# Patient Record
Sex: FEMALE | Race: WHITE | HISPANIC OR LATINO | Employment: UNEMPLOYED | URBAN - METROPOLITAN AREA
[De-identification: names, ages, dates, MRNs, and addresses within clinical notes are randomized per-mention and may not be internally consistent; named-entity substitution may affect disease eponyms.]

---

## 2017-01-08 DIAGNOSIS — I11.0 HYPERTENSIVE HEART DISEASE WITH CONGESTIVE HEART FAILURE (HCC): Primary | ICD-10-CM

## 2017-01-09 NOTE — TELEPHONE ENCOUNTER
Was the patient seen in the last year in this department? No  dialysis patient    Does patient have an active prescription for medications requested? No     Received Request Via: Pharmacy

## 2017-01-10 RX ORDER — CLONIDINE HYDROCHLORIDE 0.3 MG/1
TABLET ORAL
Qty: 60 TAB | Refills: 0 | Status: ON HOLD | OUTPATIENT
Start: 2017-01-10 | End: 2017-06-15

## 2017-01-24 ENCOUNTER — HOSPITAL ENCOUNTER (OUTPATIENT)
Dept: RADIOLOGY | Facility: MEDICAL CENTER | Age: 37
End: 2017-01-24
Attending: FAMILY MEDICINE
Payer: COMMERCIAL

## 2017-01-24 ENCOUNTER — HOSPITAL ENCOUNTER (OUTPATIENT)
Dept: CARDIOLOGY | Facility: MEDICAL CENTER | Age: 37
End: 2017-01-24
Attending: FAMILY MEDICINE
Payer: COMMERCIAL

## 2017-01-24 DIAGNOSIS — N18.5 CHRONIC KIDNEY DISEASE, STAGE V (HCC): ICD-10-CM

## 2017-01-24 PROCEDURE — 71020 DX-CHEST-2 VIEWS: CPT

## 2017-01-24 PROCEDURE — 93017 CV STRESS TEST TRACING ONLY: CPT

## 2017-01-24 PROCEDURE — 76700 US EXAM ABDOM COMPLETE: CPT

## 2017-01-24 PROCEDURE — 93350 STRESS TTE ONLY: CPT

## 2017-01-24 PROCEDURE — 93018 CV STRESS TEST I&R ONLY: CPT | Performed by: INTERNAL MEDICINE

## 2017-01-24 PROCEDURE — 93350 STRESS TTE ONLY: CPT | Mod: 26 | Performed by: INTERNAL MEDICINE

## 2017-01-25 LAB
LV EJECT FRACT  99904: 40
LV EJECT FRACT MOD 2C 99903: 52.1
LV EJECT FRACT MOD 4C 99902: 40.53
LV EJECT FRACT MOD BP 99901: 45.41

## 2017-02-03 ENCOUNTER — TELEPHONE (OUTPATIENT)
Dept: NEPHROLOGY | Facility: MEDICAL CENTER | Age: 37
End: 2017-02-03

## 2017-02-03 NOTE — TELEPHONE ENCOUNTER
1. Caller Name: Stephenie-[facility] transplant                      Call Back Number: [phone]    2. Message: Stephenie just wanted to make sure that nephrology is aware that pts echo came back abn. She would like to recommend a request for cardiac clearance. I'm not sure if this is Dr Flower or Dr Garcia patient currently, so I will send this to the both of you.    3. Patient approves office to leave a detailed voicemail/MyChart message: yes

## 2017-02-17 DIAGNOSIS — N18.6 ESRD (END STAGE RENAL DISEASE) ON DIALYSIS (HCC): ICD-10-CM

## 2017-02-17 DIAGNOSIS — R94.39 ABNORMAL STRESS ECHOCARDIOGRAM: ICD-10-CM

## 2017-02-17 DIAGNOSIS — Z99.2 ESRD (END STAGE RENAL DISEASE) ON DIALYSIS (HCC): ICD-10-CM

## 2017-03-21 ENCOUNTER — TELEPHONE (OUTPATIENT)
Dept: NEPHROLOGY | Facility: MEDICAL CENTER | Age: 37
End: 2017-03-21

## 2017-03-21 NOTE — TELEPHONE ENCOUNTER
1. Caller Name: Mago Billings                      Call Back Number: [phone]    2. Message: Pt called, she is requesting an rx to be sent to her pharmacy. She says that she saw Dr Flower in dialysis and he would send rx. She thinks that it is [facility]. I also paged Dr Flower to advise.     3. Patient approves office to leave a detailed voicemail/MyChart message: yes

## 2017-04-10 ENCOUNTER — TELEPHONE (OUTPATIENT)
Dept: NEPHROLOGY | Facility: MEDICAL CENTER | Age: 37
End: 2017-04-10

## 2017-04-10 NOTE — TELEPHONE ENCOUNTER
1. Caller Name: Chencho Willingham                      Call Back Number: -    2. Message: ANA LAURA Johnson called to advise that patient has decided to transfer care to [facility].     3. Patient approves office to leave a detailed voicemail/MyChart message: N/A

## 2017-06-14 ENCOUNTER — RESOLUTE PROFESSIONAL BILLING HOSPITAL PROF FEE (OUTPATIENT)
Dept: HOSPITALIST | Facility: MEDICAL CENTER | Age: 37
End: 2017-06-14
Payer: COMMERCIAL

## 2017-06-14 ENCOUNTER — APPOINTMENT (OUTPATIENT)
Dept: RADIOLOGY | Facility: MEDICAL CENTER | Age: 37
DRG: 304 | End: 2017-06-14
Attending: EMERGENCY MEDICINE
Payer: COMMERCIAL

## 2017-06-14 ENCOUNTER — HOSPITAL ENCOUNTER (INPATIENT)
Facility: MEDICAL CENTER | Age: 37
LOS: 1 days | DRG: 304 | End: 2017-06-15
Attending: EMERGENCY MEDICINE | Admitting: HOSPITALIST
Payer: COMMERCIAL

## 2017-06-14 DIAGNOSIS — I16.0 HYPERTENSIVE URGENCY: ICD-10-CM

## 2017-06-14 LAB
ALBUMIN SERPL BCP-MCNC: 4.5 G/DL (ref 3.2–4.9)
ALBUMIN/GLOB SERPL: 1.3 G/DL
ALP SERPL-CCNC: 86 U/L (ref 30–99)
ALT SERPL-CCNC: 16 U/L (ref 2–50)
ANION GAP SERPL CALC-SCNC: 15 MMOL/L (ref 0–11.9)
APTT PPP: 35 SEC (ref 24.7–36)
AST SERPL-CCNC: 16 U/L (ref 12–45)
BASOPHILS # BLD AUTO: 1.1 % (ref 0–1.8)
BASOPHILS # BLD: 0.04 K/UL (ref 0–0.12)
BILIRUB SERPL-MCNC: 0.6 MG/DL (ref 0.1–1.5)
BNP SERPL-MCNC: 500 PG/ML (ref 0–100)
BUN SERPL-MCNC: 31 MG/DL (ref 8–22)
CALCIUM SERPL-MCNC: 9.2 MG/DL (ref 8.4–10.2)
CHLORIDE SERPL-SCNC: 95 MMOL/L (ref 96–112)
CO2 SERPL-SCNC: 28 MMOL/L (ref 20–33)
CREAT SERPL-MCNC: 5.12 MG/DL (ref 0.5–1.4)
EKG IMPRESSION: NORMAL
EOSINOPHIL # BLD AUTO: 0.13 K/UL (ref 0–0.51)
EOSINOPHIL NFR BLD: 3.6 % (ref 0–6.9)
ERYTHROCYTE [DISTWIDTH] IN BLOOD BY AUTOMATED COUNT: 43 FL (ref 35.9–50)
GFR SERPL CREATININE-BSD FRML MDRD: 10 ML/MIN/1.73 M 2
GLOBULIN SER CALC-MCNC: 3.6 G/DL (ref 1.9–3.5)
GLUCOSE SERPL-MCNC: 101 MG/DL (ref 65–99)
HCT VFR BLD AUTO: 37.4 % (ref 37–47)
HGB BLD-MCNC: 12.3 G/DL (ref 12–16)
IMM GRANULOCYTES # BLD AUTO: 0.01 K/UL (ref 0–0.11)
IMM GRANULOCYTES NFR BLD AUTO: 0.3 % (ref 0–0.9)
INR PPP: 1.01 (ref 0.87–1.13)
LYMPHOCYTES # BLD AUTO: 0.43 K/UL (ref 1–4.8)
LYMPHOCYTES NFR BLD: 11.9 % (ref 22–41)
MCH RBC QN AUTO: 30.7 PG (ref 27–33)
MCHC RBC AUTO-ENTMCNC: 32.9 G/DL (ref 33.6–35)
MCV RBC AUTO: 93.3 FL (ref 81.4–97.8)
MONOCYTES # BLD AUTO: 0.3 K/UL (ref 0–0.85)
MONOCYTES NFR BLD AUTO: 8.3 % (ref 0–13.4)
NEUTROPHILS # BLD AUTO: 2.71 K/UL (ref 2–7.15)
NEUTROPHILS NFR BLD: 74.8 % (ref 44–72)
NRBC # BLD AUTO: 0 K/UL
NRBC BLD AUTO-RTO: 0 /100 WBC
PLATELET # BLD AUTO: 160 K/UL (ref 164–446)
PMV BLD AUTO: 10.3 FL (ref 9–12.9)
POTASSIUM SERPL-SCNC: 4.3 MMOL/L (ref 3.6–5.5)
PROT SERPL-MCNC: 8.1 G/DL (ref 6–8.2)
PROTHROMBIN TIME: 13.1 SEC (ref 12–14.6)
RBC # BLD AUTO: 4.01 M/UL (ref 4.2–5.4)
SODIUM SERPL-SCNC: 138 MMOL/L (ref 135–145)
TROPONIN I SERPL-MCNC: 0.02 NG/ML (ref 0–0.04)
TROPONIN I SERPL-MCNC: <0.02 NG/ML (ref 0–0.04)
WBC # BLD AUTO: 3.6 K/UL (ref 4.8–10.8)

## 2017-06-14 PROCEDURE — 94760 N-INVAS EAR/PLS OXIMETRY 1: CPT

## 2017-06-14 PROCEDURE — 80053 COMPREHEN METABOLIC PANEL: CPT

## 2017-06-14 PROCEDURE — 36415 COLL VENOUS BLD VENIPUNCTURE: CPT

## 2017-06-14 PROCEDURE — 700105 HCHG RX REV CODE 258: Performed by: HOSPITALIST

## 2017-06-14 PROCEDURE — 700102 HCHG RX REV CODE 250 W/ 637 OVERRIDE(OP): Performed by: HOSPITALIST

## 2017-06-14 PROCEDURE — 700111 HCHG RX REV CODE 636 W/ 250 OVERRIDE (IP): Performed by: EMERGENCY MEDICINE

## 2017-06-14 PROCEDURE — 99291 CRITICAL CARE FIRST HOUR: CPT | Performed by: HOSPITALIST

## 2017-06-14 PROCEDURE — A9270 NON-COVERED ITEM OR SERVICE: HCPCS | Performed by: HOSPITALIST

## 2017-06-14 PROCEDURE — 70450 CT HEAD/BRAIN W/O DYE: CPT

## 2017-06-14 PROCEDURE — 85730 THROMBOPLASTIN TIME PARTIAL: CPT

## 2017-06-14 PROCEDURE — 96375 TX/PRO/DX INJ NEW DRUG ADDON: CPT

## 2017-06-14 PROCEDURE — 84484 ASSAY OF TROPONIN QUANT: CPT

## 2017-06-14 PROCEDURE — 770022 HCHG ROOM/CARE - ICU (200)

## 2017-06-14 PROCEDURE — 83880 ASSAY OF NATRIURETIC PEPTIDE: CPT

## 2017-06-14 PROCEDURE — 700101 HCHG RX REV CODE 250

## 2017-06-14 PROCEDURE — 93005 ELECTROCARDIOGRAM TRACING: CPT

## 2017-06-14 PROCEDURE — 85610 PROTHROMBIN TIME: CPT

## 2017-06-14 PROCEDURE — 93005 ELECTROCARDIOGRAM TRACING: CPT | Performed by: EMERGENCY MEDICINE

## 2017-06-14 PROCEDURE — 96374 THER/PROPH/DIAG INJ IV PUSH: CPT

## 2017-06-14 PROCEDURE — 71010 DX-CHEST-PORTABLE (1 VIEW): CPT

## 2017-06-14 PROCEDURE — 700101 HCHG RX REV CODE 250: Performed by: HOSPITALIST

## 2017-06-14 PROCEDURE — 99291 CRITICAL CARE FIRST HOUR: CPT

## 2017-06-14 PROCEDURE — 700111 HCHG RX REV CODE 636 W/ 250 OVERRIDE (IP): Performed by: HOSPITALIST

## 2017-06-14 PROCEDURE — 85025 COMPLETE CBC W/AUTO DIFF WBC: CPT

## 2017-06-14 RX ORDER — LORAZEPAM 2 MG/ML
2 INJECTION INTRAMUSCULAR EVERY 4 HOURS PRN
Status: DISCONTINUED | OUTPATIENT
Start: 2017-06-14 | End: 2017-06-14

## 2017-06-14 RX ORDER — METOPROLOL TARTRATE 100 MG/1
100 TABLET ORAL DAILY
Status: ON HOLD | COMMUNITY
End: 2017-06-15

## 2017-06-14 RX ORDER — METOPROLOL TARTRATE 50 MG/1
100 TABLET, FILM COATED ORAL TWICE DAILY
Status: DISCONTINUED | OUTPATIENT
Start: 2017-06-14 | End: 2017-06-15

## 2017-06-14 RX ORDER — DILTIAZEM HYDROCHLORIDE 5 MG/ML
10 INJECTION INTRAVENOUS ONCE
Status: DISPENSED | OUTPATIENT
Start: 2017-06-14 | End: 2017-06-15

## 2017-06-14 RX ORDER — NICARDIPINE HYDROCHLORIDE 2.5 MG/ML
INJECTION INTRAVENOUS
Status: COMPLETED
Start: 2017-06-14 | End: 2017-06-15

## 2017-06-14 RX ORDER — LORAZEPAM 0.5 MG/1
.5-2 TABLET ORAL EVERY 4 HOURS PRN
Status: DISCONTINUED | OUTPATIENT
Start: 2017-06-14 | End: 2017-06-15 | Stop reason: HOSPADM

## 2017-06-14 RX ORDER — LABETALOL HYDROCHLORIDE 5 MG/ML
10 INJECTION, SOLUTION INTRAVENOUS ONCE
Status: DISCONTINUED | OUTPATIENT
Start: 2017-06-14 | End: 2017-06-14

## 2017-06-14 RX ORDER — HYDRALAZINE HYDROCHLORIDE 100 MG/1
100 TABLET, FILM COATED ORAL 2 TIMES DAILY
Status: ON HOLD | COMMUNITY
End: 2017-06-15

## 2017-06-14 RX ORDER — HYDRALAZINE HYDROCHLORIDE 20 MG/ML
10 INJECTION INTRAMUSCULAR; INTRAVENOUS
Status: COMPLETED | OUTPATIENT
Start: 2017-06-14 | End: 2017-06-15

## 2017-06-14 RX ORDER — BISACODYL 10 MG
10 SUPPOSITORY, RECTAL RECTAL
Status: DISCONTINUED | OUTPATIENT
Start: 2017-06-14 | End: 2017-06-15 | Stop reason: HOSPADM

## 2017-06-14 RX ORDER — ACETAMINOPHEN 325 MG/1
650 TABLET ORAL EVERY 6 HOURS PRN
Status: DISCONTINUED | OUTPATIENT
Start: 2017-06-14 | End: 2017-06-15 | Stop reason: HOSPADM

## 2017-06-14 RX ORDER — ENALAPRILAT 1.25 MG/ML
1.25 INJECTION INTRAVENOUS EVERY 6 HOURS PRN
Status: DISCONTINUED | OUTPATIENT
Start: 2017-06-14 | End: 2017-06-15 | Stop reason: HOSPADM

## 2017-06-14 RX ORDER — SEVELAMER HYDROCHLORIDE 800 MG/1
2400 TABLET, FILM COATED ORAL
Status: DISCONTINUED | OUTPATIENT
Start: 2017-06-14 | End: 2017-06-15 | Stop reason: HOSPADM

## 2017-06-14 RX ORDER — LABETALOL HYDROCHLORIDE 5 MG/ML
INJECTION, SOLUTION INTRAVENOUS
Status: DISPENSED
Start: 2017-06-14 | End: 2017-06-15

## 2017-06-14 RX ORDER — DILTIAZEM HYDROCHLORIDE 5 MG/ML
10 INJECTION INTRAVENOUS ONCE
Status: ACTIVE | OUTPATIENT
Start: 2017-06-14 | End: 2017-06-15

## 2017-06-14 RX ORDER — CLONIDINE HYDROCHLORIDE 0.1 MG/1
0.3 TABLET ORAL TWICE DAILY
Status: DISCONTINUED | OUTPATIENT
Start: 2017-06-14 | End: 2017-06-15 | Stop reason: HOSPADM

## 2017-06-14 RX ORDER — LORAZEPAM 2 MG/ML
0.5 INJECTION INTRAMUSCULAR EVERY 6 HOURS PRN
Status: DISCONTINUED | OUTPATIENT
Start: 2017-06-14 | End: 2017-06-14

## 2017-06-14 RX ORDER — NICARDIPINE HYDROCHLORIDE 2.5 MG/ML
INJECTION INTRAVENOUS
Status: COMPLETED
Start: 2017-06-14 | End: 2017-06-14

## 2017-06-14 RX ORDER — LORAZEPAM 2 MG/ML
.5-2 INJECTION INTRAMUSCULAR EVERY 4 HOURS PRN
Status: DISCONTINUED | OUTPATIENT
Start: 2017-06-14 | End: 2017-06-15 | Stop reason: HOSPADM

## 2017-06-14 RX ORDER — LORAZEPAM 1 MG/1
2 TABLET ORAL EVERY 4 HOURS PRN
Status: DISCONTINUED | OUTPATIENT
Start: 2017-06-14 | End: 2017-06-14

## 2017-06-14 RX ORDER — POLYETHYLENE GLYCOL 3350 17 G/17G
1 POWDER, FOR SOLUTION ORAL
Status: DISCONTINUED | OUTPATIENT
Start: 2017-06-14 | End: 2017-06-15 | Stop reason: HOSPADM

## 2017-06-14 RX ORDER — LORAZEPAM 1 MG/1
0.5 TABLET ORAL EVERY 6 HOURS PRN
Status: DISCONTINUED | OUTPATIENT
Start: 2017-06-14 | End: 2017-06-14

## 2017-06-14 RX ORDER — SEVELAMER CARBONATE 800 MG/1
2400 TABLET, FILM COATED ORAL
COMMUNITY
End: 2017-06-16 | Stop reason: SDUPTHER

## 2017-06-14 RX ORDER — ONDANSETRON 2 MG/ML
4 INJECTION INTRAMUSCULAR; INTRAVENOUS EVERY 4 HOURS PRN
Status: DISCONTINUED | OUTPATIENT
Start: 2017-06-14 | End: 2017-06-15 | Stop reason: HOSPADM

## 2017-06-14 RX ORDER — HEPARIN SODIUM 5000 [USP'U]/ML
5000 INJECTION, SOLUTION INTRAVENOUS; SUBCUTANEOUS EVERY 8 HOURS
Status: DISCONTINUED | OUTPATIENT
Start: 2017-06-14 | End: 2017-06-15 | Stop reason: HOSPADM

## 2017-06-14 RX ORDER — DILTIAZEM HYDROCHLORIDE 5 MG/ML
10 INJECTION INTRAVENOUS ONCE
Status: COMPLETED | OUTPATIENT
Start: 2017-06-14 | End: 2017-06-14

## 2017-06-14 RX ORDER — ONDANSETRON 2 MG/ML
4 INJECTION INTRAMUSCULAR; INTRAVENOUS ONCE
Status: COMPLETED | OUTPATIENT
Start: 2017-06-14 | End: 2017-06-14

## 2017-06-14 RX ORDER — HYDRALAZINE HYDROCHLORIDE 25 MG/1
100 TABLET, FILM COATED ORAL 3 TIMES DAILY
Status: DISCONTINUED | OUTPATIENT
Start: 2017-06-14 | End: 2017-06-15 | Stop reason: HOSPADM

## 2017-06-14 RX ORDER — AMOXICILLIN 250 MG
2 CAPSULE ORAL 2 TIMES DAILY
Status: DISCONTINUED | OUTPATIENT
Start: 2017-06-14 | End: 2017-06-15 | Stop reason: HOSPADM

## 2017-06-14 RX ADMIN — LORAZEPAM 1.5 MG: 2 INJECTION INTRAMUSCULAR; INTRAVENOUS at 21:11

## 2017-06-14 RX ADMIN — HYDRALAZINE HYDROCHLORIDE 10 MG: 20 INJECTION INTRAMUSCULAR; INTRAVENOUS at 14:54

## 2017-06-14 RX ADMIN — METOPROLOL TARTRATE 100 MG: 50 TABLET ORAL at 19:43

## 2017-06-14 RX ADMIN — ONDANSETRON 4 MG: 2 INJECTION INTRAMUSCULAR; INTRAVENOUS at 21:12

## 2017-06-14 RX ADMIN — NICARDIPINE HYDROCHLORIDE 25 MG: 25 INJECTION INTRAVENOUS at 20:25

## 2017-06-14 RX ADMIN — HYDRALAZINE HYDROCHLORIDE 10 MG: 20 INJECTION INTRAMUSCULAR; INTRAVENOUS at 14:37

## 2017-06-14 RX ADMIN — HYDRALAZINE HYDROCHLORIDE 50 MG: 25 TABLET ORAL at 21:49

## 2017-06-14 RX ADMIN — RENAGEL 2400 MG: 800 TABLET ORAL at 19:43

## 2017-06-14 RX ADMIN — HYDROMORPHONE HYDROCHLORIDE 0.5 MG: 1 INJECTION, SOLUTION INTRAMUSCULAR; INTRAVENOUS; SUBCUTANEOUS at 17:38

## 2017-06-14 RX ADMIN — NICARDIPINE HYDROCHLORIDE 5 MG/HR: 2.5 INJECTION INTRAVENOUS at 20:26

## 2017-06-14 RX ADMIN — DILTIAZEM HYDROCHLORIDE 10 MG: 5 INJECTION INTRAVENOUS at 18:02

## 2017-06-14 RX ADMIN — CLONIDINE HYDROCHLORIDE 0.3 MG: 0.1 TABLET ORAL at 19:43

## 2017-06-14 RX ADMIN — LORAZEPAM 0.5 MG: 2 INJECTION INTRAMUSCULAR; INTRAVENOUS at 20:23

## 2017-06-14 RX ADMIN — ACETAMINOPHEN 650 MG: 325 TABLET, FILM COATED ORAL at 20:23

## 2017-06-14 RX ADMIN — ONDANSETRON 4 MG: 2 INJECTION INTRAMUSCULAR; INTRAVENOUS at 17:38

## 2017-06-14 RX ADMIN — MULTIVITAMIN TABLET 1 TABLET: TABLET at 21:49

## 2017-06-14 ASSESSMENT — PAIN SCALES - GENERAL
PAINLEVEL_OUTOF10: 10
PAINLEVEL_OUTOF10: 4
PAINLEVEL_OUTOF10: 10
PAINLEVEL_OUTOF10: 7

## 2017-06-14 NOTE — IP AVS SNAPSHOT
" Home Care Instructions                                                                                                                  Name:Mago Farfan  Medical Record Number:[id number]  CSN: [id number]    YOB: 1980   Age: 37 y.o.  Sex: female  HT:1.676 m (5' 5.98\") WT: 60 kg (132 lb 4.4 oz)          Admit Date: 6/14/2017     Discharge Date:   Today's Date: 6/15/2017  Attending Doctor:  Coleen Ling M.D.                  Allergies:  Iodine; Labetalol; and Morphine            Discharge Instructions       Discharge Instructions    Discharged to home by car with relative. Discharged via walking, hospital escort: Refused.  Special equipment needed: Not Applicable    Be sure to schedule a follow-up appointment with your primary care doctor or any specialists as instructed.     Discharge Plan:   Influenza Vaccine Indication: Not indicated: Previously immunized this influenza season and > 8 years of age    I understand that a diet low in cholesterol, fat, and sodium is recommended for good health. Unless I have been given specific instructions below for another diet, I accept this instruction as my diet prescription.   Other diet: Renal diet    Continue Dialysis Monday, Wednesday, Friday as scheduled.   Follow up with Primary care doctor in 1 week.   Follow up with nephrologist in 1 week.    Hipertensión  (Hypertension)  El término hipertensión es otra forma de denominar a la presión arterial elevada. La presión arterial elevada fuerza al corazón a trabajar más para bombear la tavo. Kaia lectura de la presión arterial consta de dos números: marquis más alto sobre marquis más bajo (por ejemplo, 110/72).  CUIDADOS EN EL HOGAR   · Dc que el médico le tome nuevamente la presión arterial.  · [place] los medicamentos solamente daja se lo haya indicado el médico. Siga cuidadosamente las indicaciones. Los medicamentos pierden eficacia si omite dosis. El hecho de omitir las dosis también aumenta el riesgo de otros " problemas.  · No fume.  · Contrólese la presión arterial en sher casa daja se lo haya indicado el médico.  SOLICITE AYUDA SI:  · Piensa que tiene amisha reacción a los medicamentos que está tomando.  · Tiene mareos o trinity de haleigh reiterados.  · Se le inflaman (hinchan) los tobillos.  · Tiene problemas de visión.  SOLICITE AYUDA DE INMEDIATO SI:   · Tiene un dolor de haleigh muy intenso y está confundido.  · Se siente débil, aturdido o se desmaya.  · Tiene dolor en el pecho o el estómago (abdominal).  · Tiene vómitos.  · No puede respirar muy anirudh.  ASEGÚRESE DE QUE:   · Comprende estas instrucciones.  · Controlará sher afección.  · Recibirá ayuda de inmediato si no mejora o si empeora.     Esta información no tiene daja fin reemplazar el consejo del médico. Asegúrese de hacerle al médico cualquier pregunta que tenga.     Document Released: 06/07/2011 Document Revised: 12/23/2014  [employer] Interactive Patient Education ©2016 [employer]      Special Instructions: None    · Is patient discharged on Warfarin / Coumadin?   No     · Is patient Post Blood Transfusion?  No    Depression / Suicide Risk    As you are discharged from this [facility] facility, it is important to learn how to keep safe from harming yourself.    Recognize the warning signs:  · Abrupt changes in personality, positive or negative- including increase in energy   · Giving away possessions  · Change in eating patterns- significant weight changes-  positive or negative  · Change in sleeping patterns- unable to sleep or sleeping all the time   · Unwillingness or inability to communicate  · Depression  · Unusual sadness, discouragement and loneliness  · Talk of wanting to die  · Neglect of personal appearance   · Rebelliousness- reckless behavior  · Withdrawal from people/activities they love  · Confusion- inability to concentrate     If you or a loved one observes any of these behaviors or has concerns about self-harm, here's what you can do:  · Talk  about it- your feelings and reasons for harming yourself  · Remove any means that you might use to hurt yourself (examples: pills, rope, extension cords, firearm)  · Get professional help from the community (Mental Health, Substance Abuse, psychological counseling)  · Do not be alone:Call your Safe Contact- someone whom you trust who will be there for you.  · Call your local CRISIS HOTLINE 630-0034 or 355-796-3204  · Call your local Children's Mobile Crisis Response Team [place] (856) 248-0662 or wwwNow In Store  · Call the toll free National Suicide Prevention Hotlines   · National Suicide Prevention Lifeline 603-709-IGLG (4576)  · National Hope Line Network 800-SUICIDE (127-4310)        Follow-up Information     1. Follow up with [facility] DIALYSIS [facility] In 1 day.    Specialty:  Nephrology    Why:  Keep appointment tomorrow morning for dialysis    Contact information    [phone]          2. Follow up with Quinton Saldana M.D.. Schedule an appointment as soon as possible for a visit in 1 week.    Specialty:  Family Medicine    Contact information    [facility] [facility] [address]  [facility] [address]  [phone]           Discharge Medication Instructions:    Below are the medications your physician expects you to take upon discharge:    Review all your home medications and newly ordered medications with your doctor and/or pharmacist. Follow medication instructions as directed by your doctor and/or pharmacist.    Please keep your medication list with you and share with your physician.               Medication List      CHANGE how you take these medications        Instructions    Morning Afternoon Evening Bedtime    hydrALAZINE 100 MG tablet   What changed:    - when to take this  - additional instructions   Last time this was given:  100 mg on 6/15/2017  3:29 PM   Commonly known as:  APRESOLINE        Take 1 Tab by mouth 3 times a day. Do not take if your blood pressure is <100/60   Dose:  100 mg                         metoprolol 100 MG Tabs   What changed:  when to take this   Last time this was given:  50 mg on 6/15/2017  1:43 PM   Commonly known as:  LOPRESSOR        Take 1 Tab by mouth 2 times a day.   Dose:  100 mg                          CONTINUE taking these medications        Instructions    Morning Afternoon Evening Bedtime    acetaminophen 325 MG Tabs   Last time this was given:  650 mg on 6/15/2017 12:09 PM   Commonly known as:  TYLENOL        Take 325 mg by mouth every 6 hours as needed for Moderate Pain.   Dose:  325 mg                        aspirin EC 81 MG Tbec   Commonly known as:  ECOTRIN        Take 81 mg by mouth as needed (For headache).   Dose:  81 mg                        DIALYVITE 800 PO        Take 1 Tab by mouth every evening.   Dose:  1 Tab                        RENVELA 800 MG Tabs   Generic drug:  Sevelamer Carbonate        Take 2,400 mg by mouth 3 times a day, with meals.   Dose:  2400 mg                          STOP taking these medications     clonidine 0.3 MG Tabs   Commonly known as:  CATAPRESS                    Where to Get Your Medications      [facility] [place] - [address]  [address] [place] [address]     Phone:  [phone]    - hydrALAZINE 100 MG tablet  - metoprolol 100 MG Tabs            Instructions           Diet / Nutrition:    Follow any diet instructions given to you by your doctor or the dietician, including how much salt (sodium) you are allowed each day.    If you are overweight, talk to your doctor about a weight reduction plan.    Activity:    Remain physically active following your doctor's instructions about exercise and activity.    Rest often.     Any time you become even a little tired or short of breath, SIT DOWN and rest.    Worsening Symptoms:    Report any of the following signs and symptoms to the doctor's office immediately:    *Pain of jaw, arm, or neck  *Chest pain not relieved by medication                                *Dizziness or loss of consciousness  *Difficulty breathing even when at rest   *More tired than usual                                       *Bleeding drainage or swelling of surgical site  *Swelling of feet, ankles, legs or stomach                 *Fever (>100ºF)  *Pink or blood tinged sputum  *Weight gain (3lbs/day or 5lbs /week)           *Shock from internal defibrillator (if applicable)  *Palpitations or irregular heartbeats                *Cool and/or numb extremities    Stroke Awareness    Common Risk Factors for Stroke include:    Age  Atrial Fibrillation  Carotid Artery Stenosis  Diabetes Mellitus  Excessive alcohol consumption  High blood pressure  Overweight   Physical inactivity  Smoking    Warning signs and symptoms of a stroke include:    *Sudden numbness or weakness of the face, arm or leg (especially on one side of the body).  *Sudden confusion, trouble speaking or understanding.  *Sudden trouble seeing in one or both eyes.  *Sudden trouble walking, dizziness, loss of balance or coordination.Sudden severe headache with no known cause.    It is very important to get treatment quickly when a stroke occurs. If you experience any of the above warning signs, call 911 immediately.                   Disclaimer         Quit Smoking / Tobacco Use:    I understand the use of any tobacco products increases my chance of suffering from future heart disease or stroke and could cause other illnesses which may shorten my life. Quitting the use of tobacco products is the single most important thing I can do to improve my health. For further information on smoking / tobacco cessation call a Toll Free Quit Line at 1-142.718.7056 (*National Cancer [place]) or 1-744.118.2387 (American Lung Association) or you can access the web based program at www.lungusa.org.    [place] Tobacco Users Help Line:  (481) 605-9575       Toll Free: 1-550.251.8125  Quit Tobacco Program [facility]  (800) 247-9157    Crisis Hotline:    [place] Crisis Hotline:  5-926-DILNQPM or 1-874.732.1405    [place] Crisis Hotline:    1-170.214.4606 or 685-361-6875    Discharge Survey:   Thank you for choosing [facility]. We hope we did everything we could to make your hospital stay a pleasant one. You may be receiving a phone survey and we would appreciate your time and participation in answering the questions. Your input is very valuable to us in our efforts to improve our service to our patients and their families.        My signature on this form indicates that:    1. I have reviewed and understand the above information.  2. My questions regarding this information have been answered to my satisfaction.  3. I have formulated a plan with my discharge nurse to obtain my prescribed medications for home.                  Disclaimer         __________________________________                     __________       ________                       Patient Signature                                                 Date                    Time

## 2017-06-14 NOTE — IP AVS SNAPSHOT
6/15/2017    Mago Farfan  [address]  [facility] [address]    Dear Mago:    [facility] wants to ensure your discharge home is safe and you or your loved ones have had all of your questions answered regarding your care after you leave the hospital.    Below is a list of resources and contact information should you have any questions regarding your hospital stay, follow-up instructions, or active medical symptoms.    Questions or Concerns Regarding… Contact   Medical Questions Related to Your Discharge  (7 days a week, 8am-5pm) Contact a Nurse Care Coordinator   [phone]   Medical Questions Not Related to Your Discharge  (24 hours a day / 7 days a week)  Contact the Nurse Health Line   [phone]    Medications or Discharge Instructions Refer to your discharge packet   or contact your [facility] Primary Care Provider   [phone]   Follow-up Appointment(s) Schedule your appointment via [employer]   or contact Scheduling [phone]   Billing Review your statement via [employer]  or contact Billing [phone]   Medical Records Review your records via [employer]   or contact Medical Records [phone]     You may receive a telephone call within two days of discharge. This call is to make certain you understand your discharge instructions and have the opportunity to have any questions answered. You can also easily access your medical information, test results and upcoming appointments via the [employer] free online health management tool. You can learn more and sign up at [employer]. For assistance setting up your [employer] account, please call [phone].    Once again, we want to ensure your discharge home is safe and that you have a clear understanding of any next steps in your care. If you have any questions or concerns, please do not hesitate to contact us, we are here for you. Thank you for choosing [facility] for your healthcare needs.    Sincerely,    Your [facility] Healthcare Team

## 2017-06-14 NOTE — IP AVS SNAPSHOT
Zigfu Access Code: [email or web address]  Expires: 6/27/2017  4:14 AM    Your email address is not on file at [facility].  Email Addresses are required for you to sign up for Zigfu, please contact [phone] to verify your personal information and to provide your email address prior to attempting to register for Zigfu.    Mago Farfan  [facility] [address]  [place] [address]    Zigfu  A secure, online tool to manage your health information     [employer]’s Zigfu® is a secure, online tool that connects you to your personalized health information from the privacy of your home -- day or night - making it very easy for you to manage your healthcare. Once the activation process is completed, you can even access your medical information using the Zigfu pancho, which is available for free in the Apple Pancho store or Google Play store.     To learn more about Zigfu, visit [email or web address]    There are two levels of access available (as shown below):   My Chart Features  [facility] Primary Care Doctor [facility]  Specialists [facility]  Urgent  Care Non-[facility] Primary Care Doctor   Email your healthcare team securely and privately 24/7 X X X    Manage appointments: schedule your next appointment; view details of past/upcoming appointments X      Request prescription refills. X      View recent personal medical records, including lab and immunizations X X X X   View health record, including health history, allergies, medications X X X X   Read reports about your outpatient visits, procedures, consult and ER notes X X X X   See your discharge summary, which is a recap of your hospital and/or ER visit that includes your diagnosis, lab results, and care plan X X  X     How to register for Zigfu:  Once your e-mail address has been verified, follow the following steps to sign up for Zigfu.     1. Go to  [email or web address]  2. Click on the Sign Up Now box, which takes you to the New Member Sign Up page.  You will need to provide the following information:  a. Enter your [employer] Access Code exactly as it appears at the top of this page. (You will not need to use this code after you’ve completed the sign-up process. If you do not sign up before the expiration date, you must request a new code.)   b. Enter your date of birth.   c. Enter your home email address.   d. Click Submit, and follow the next screen’s instructions.  3. Create a GetYout ID. This will be your Fariqak login ID and cannot be changed, so think of one that is secure and easy to remember.  4. Create a Fariqak password. You can change your password at any time.  5. Enter your Password Reset Question and Answer. This can be used at a later time if you forget your password.   6. Enter your e-mail address. This allows you to receive e-mail notifications when new information is available in Fariqak.  7. Click Sign Up. You can now view your health information.    For assistance activating your Fari[employer]ak account, call [phone]

## 2017-06-14 NOTE — ED NOTES
Pt /103. Went to give pt 3rd dose of hydralazine per ERP. Pt states no she doesn't want anymore. Pt states heart is going fast. Pt HR now at 115, ERP aware. Repeat EKG ordered, tech at BS

## 2017-06-14 NOTE — IP AVS SNAPSHOT
" <p align=\"LEFT\"><IMG SRC=\"//[email or web address]$/[email or web address]\" alt=\"Image\" WIDTH=\"50%\" HEIGHT=\"200\" BORDER=\"\"></p>                   Name:Mago Farfan  Medical Record Number:[id number]  CSN: [id number]    YOB: 1980   Age: 37 y.o.  Sex: female  HT:1.676 m (5' 5.98\") WT: 60 kg (132 lb 4.4 oz)          Admit Date: 6/14/2017     Discharge Date:   Today's Date: 6/15/2017  Attending Doctor:  Coleen Ling M.D.                  Allergies:  Iodine; Labetalol; and Morphine          Follow-up Information     1. Follow up with [facility] In 1 day.    Specialty:  Nephrology    Why:  Keep appointment tomorrow morning for dialysis    Contact information    [phone]          2. Follow up with Quinton Saldana M.D.. Schedule an appointment as soon as possible for a visit in 1 week.    Specialty:  Family Medicine    Contact information    [facility] [facility] [address]  [facility] [address]  [phone]           Medication List      Take these Medications        Instructions    acetaminophen 325 MG Tabs   Commonly known as:  TYLENOL    Take 325 mg by mouth every 6 hours as needed for Moderate Pain.   Dose:  325 mg       aspirin EC 81 MG Tbec   Commonly known as:  ECOTRIN    Take 81 mg by mouth as needed (For headache).   Dose:  81 mg       DIALYVITE 800 PO    Take 1 Tab by mouth every evening.   Dose:  1 Tab       hydrALAZINE 100 MG tablet   What changed:    - when to take this  - additional instructions   Commonly known as:  APRESOLINE    Take 1 Tab by mouth 3 times a day. Do not take if your blood pressure is <100/60   Dose:  100 mg       metoprolol 100 MG Tabs   What changed:  when to take this   Commonly known as:  LOPRESSOR    Take 1 Tab by mouth 2 times a day.   Dose:  100 mg       RENVELA 800 MG Tabs   Generic drug:  Sevelamer Carbonate    Take 2,400 mg by mouth 3 times a day, with meals.   Dose:  2400 mg         "

## 2017-06-14 NOTE — ED PROVIDER NOTES
ED Provider Note    CHIEF COMPLAINT  Chief Complaint   Patient presents with   • Blood Pressure Problem     blood pressure and heart rate high  chest pain  Just had dialysis every M,W,F       HPI  Mago Farfan is a 37 y.o. female with a history of end-stage renal disease on hemodialysis Monday, Wednesday, Friday, hypertension who presents from dialysis for elevated blood pressure. The patient says she has been taking her medications. At dialysis she was noted to have elevated blood pressure. Her dialysis was completed and she was sent to the ER for further evaluation. The patient complained of a mild headache in triage, which has since resolved. She denies any chest pain, back pain, or abdominal pain. There is a nursing that states  she had high blood pressure and a high heart rate after dialysis.  The dialysis center was called, and they reported that the patient had elevated blood pressure greater than 200, and the patient complained of feeling like her heart was racing, though this was not confirmed.  The patient normally has elevated blood pressure which resolves after dialysis, that in this case her blood pressure remained elevated. The patient took an extra hydralazine and clonidine and was sent to the emergency department.      REVIEW OF SYSTEMS  See HPI for further details. All other systems are negative.     PAST MEDICAL HISTORY  Past Medical History   Diagnosis Date   • Dialysis patient (CMS-[facility])    • Hypertension    • Renal disorder        FAMILY HISTORY  Family History   Problem Relation Age of Onset   • Heart Disease Mother        SOCIAL HISTORY  Social History     Social History   • Marital Status:      Spouse Name: N/A   • Number of Children: N/A   • Years of Education: N/A     Social History Main Topics   • Smoking status: Never Smoker    • Smokeless tobacco: None   • Alcohol Use: No   • Drug Use: No   • Sexual Activity: Not Asked     Other Topics Concern   • None     Social History  "Narrative       SURGICAL HISTORY  Past Surgical History   Procedure Laterality Date   • Other cardiac surgery         CURRENT MEDICATIONS  Home Medications     **Home medications have not yet been reviewed for this encounter**          ALLERGIES  Allergies   Allergen Reactions   • Iodine Rash     Rash   • Morphine      Pt states that she can tolerate small dose of morphine (can tolerate up to 2mg dosage).       PHYSICAL EXAM  VITAL SIGNS: /132 mmHg  Pulse 65  Temp(Src) 36.9 °C (98.4 °F)  Resp 16  Ht 1.676 m (5' 5.98\")  Wt 59 kg (130 lb 1.1 oz)  BMI 21.00 kg/m2  LMP 05/14/2017  Constitutional: Awake, alert, in no acute distress, Non-toxic appearance.   HENT: Atraumatic. Bilateral external ears normal, mucous membranes moist, throat nonerythematous without exudates, nose is normal.  Eyes: PERRL, EOMI, conjunctiva moist, noninjected.  Neck: Nontender, Normal range of motion, No nuchal rigidity, No stridor.   Lymphatic: No lymphadenopathy noted.   Cardiovascular: Regular rate and rhythm, no murmurs, rubs, gallops.  Thorax & Lungs:  Good breath sounds bilaterally, no wheezes, rales, or retractions.  No chest tenderness.  Abdomen: Bowel sounds normal, Soft, nontender, nondistended, no rebound, guarding, masses.  Back: No CVA or spinal tenderness.  Extremities: Intact distal pulses, No edema, No tenderness.   Skin: Warm, Dry, No rashes.   Musculoskeletal: No joint swelling or tenderness.  Neurologic: Alert & oriented x 3, sensory and motor function normal. No focal deficits.   Psychiatric: Affect normal, Judgment normal, Mood normal.     EKG  EKG #1:12-lead EKG shows a normal sinus rhythm, rate 64, left ventricular hypertrophy, normal intervals, normal axis, no acute ST-T wave elevations, there are mild diffuse ST depressions in leads 1, 2, aVF, V3 through V6, no pathologic Q waves, inverted T waves in leads 1, aVL, 2, aVF, V3 through V6, no evidence of an acute injury pattern, the ST depressions may indicate " ischemia, in comparison to previous EKG from September, 2016, there are no acute changes, the ST depressions and inverted T waves were present previously.    EKG #2:12-lead EKG shows sinus tachycardia, rate 122, normal intervals, normal axis, no acute ST wave elevations, diffuse ST depressions, inverted T waves in 1, aVL, 2, aVF, V4 through V6, no evidence of an acute injury pattern, the ST depressions and T-wave inversions may indicate ischemia, however in comparison to the EKG done earlier today there are no acute changes.        RADIOLOGY/PROCEDURES  CT-HEAD W/O   Final Result      Head CT without contrast within normal limits. No evidence of acute cerebral infarction, hemorrhage or mass lesion.      DX-CHEST-PORTABLE (1 VIEW)   Final Result      No acute cardiopulmonary findings.            COURSE & MEDICAL DECISION MAKING  Pertinent Labs & Imaging studies reviewed. (See chart for details)  The patient presents with the above complaints. On presentation she was quite hypertensive, but with a heart rate in the 60s. EKG shows a normal sinus rhythm. Patient was titrated with hydralazine, initially given 2 doses.  Her blood pressure did come down to 167/99 at the lowest, but then went back up to 229/129. She also became very tachycardic after the hydralazine, with a rate going up into the 130s. A repeat EKG was done which showed a sinus tachycardia. Her blood pressure and EKG dated come down, and her pulse is currently in the 80s. However she again is hypertensive with a blood pressure of 229/129. The patient was given a dose of Cardizem 10 mg IV.  She was also complaining of a headache, and a CT scan without contrast was obtained which shows no acute findings. Given her continued hypertension, she will be admitted. Case was discussed with Dr. Walsh.    FINAL IMPRESSION  1. Hypertensive urgency  2.   3.         Electronically signed by: Elliot Snyder, 6/14/2017 2:25 PM

## 2017-06-14 NOTE — ED NOTES
"Med rec updated and complete  Allergies reviewed  Called pts pharmacy [facility] @ [phone] to verify all prescription medications.  Pt has an RX for METOPROLOL LOPRESSOR 100MG, pt states \"I only take it once a day\".  No antibiotics in the last 30 days.  Pt just picked up her AMLODIPINE 5MG yesterday (6/14/2017), but has not started this medication yet.    Pt states \"My doctor told me to take my HYDRALAZINE 100MG 3 times a day\". \"I have been taking it twice a day\".     "

## 2017-06-14 NOTE — ED NOTES
EKG done in triage room  Patient states no chest pain now during triage only headache  Interpretuer used during triage

## 2017-06-15 VITALS
DIASTOLIC BLOOD PRESSURE: 132 MMHG | TEMPERATURE: 99 F | BODY MASS INDEX: 21.26 KG/M2 | HEIGHT: 66 IN | HEART RATE: 123 BPM | RESPIRATION RATE: 17 BRPM | WEIGHT: 132.28 LBS | OXYGEN SATURATION: 93 % | SYSTOLIC BLOOD PRESSURE: 220 MMHG

## 2017-06-15 PROBLEM — I10 ESSENTIAL HYPERTENSION: Status: ACTIVE | Noted: 2017-06-15

## 2017-06-15 PROBLEM — I16.0 HYPERTENSIVE URGENCY: Status: RESOLVED | Noted: 2017-06-14 | Resolved: 2017-06-15

## 2017-06-15 PROBLEM — R94.31 PROLONGED Q-T INTERVAL ON ECG: Status: ACTIVE | Noted: 2017-06-15

## 2017-06-15 LAB
ANION GAP SERPL CALC-SCNC: 12 MMOL/L (ref 0–11.9)
ANION GAP SERPL CALC-SCNC: 14 MMOL/L (ref 0–11.9)
BUN SERPL-MCNC: 38 MG/DL (ref 8–22)
BUN SERPL-MCNC: 41 MG/DL (ref 8–22)
CALCIUM SERPL-MCNC: 8.5 MG/DL (ref 8.4–10.2)
CALCIUM SERPL-MCNC: 9.1 MG/DL (ref 8.4–10.2)
CHLORIDE SERPL-SCNC: 96 MMOL/L (ref 96–112)
CHLORIDE SERPL-SCNC: 96 MMOL/L (ref 96–112)
CO2 SERPL-SCNC: 27 MMOL/L (ref 20–33)
CO2 SERPL-SCNC: 29 MMOL/L (ref 20–33)
CREAT SERPL-MCNC: 6.98 MG/DL (ref 0.5–1.4)
CREAT SERPL-MCNC: 8.07 MG/DL (ref 0.5–1.4)
EKG IMPRESSION: NORMAL
ERYTHROCYTE [DISTWIDTH] IN BLOOD BY AUTOMATED COUNT: 43.7 FL (ref 35.9–50)
GFR SERPL CREATININE-BSD FRML MDRD: 6 ML/MIN/1.73 M 2
GFR SERPL CREATININE-BSD FRML MDRD: 7 ML/MIN/1.73 M 2
GLUCOSE SERPL-MCNC: 119 MG/DL (ref 65–99)
GLUCOSE SERPL-MCNC: 90 MG/DL (ref 65–99)
HBV SURFACE AB SERPL IA-ACNC: >1000 MIU/ML (ref 0–10)
HBV SURFACE AG SER QL: NEGATIVE
HCT VFR BLD AUTO: 33.1 % (ref 37–47)
HGB BLD-MCNC: 10.9 G/DL (ref 12–16)
LV EJECT FRACT  99904: 60
LV EJECT FRACT MOD 2C 99903: 62.76
LV EJECT FRACT MOD 4C 99902: 55.44
LV EJECT FRACT MOD BP 99901: 59.38
MCH RBC QN AUTO: 31.1 PG (ref 27–33)
MCHC RBC AUTO-ENTMCNC: 32.9 G/DL (ref 33.6–35)
MCV RBC AUTO: 94.6 FL (ref 81.4–97.8)
PLATELET # BLD AUTO: 150 K/UL (ref 164–446)
PMV BLD AUTO: 10.1 FL (ref 9–12.9)
POTASSIUM SERPL-SCNC: 4.9 MMOL/L (ref 3.6–5.5)
POTASSIUM SERPL-SCNC: 6.4 MMOL/L (ref 3.6–5.5)
RBC # BLD AUTO: 3.5 M/UL (ref 4.2–5.4)
SODIUM SERPL-SCNC: 137 MMOL/L (ref 135–145)
SODIUM SERPL-SCNC: 137 MMOL/L (ref 135–145)
TROPONIN I SERPL-MCNC: 0.02 NG/ML (ref 0–0.04)
WBC # BLD AUTO: 6.7 K/UL (ref 4.8–10.8)

## 2017-06-15 PROCEDURE — 86706 HEP B SURFACE ANTIBODY: CPT

## 2017-06-15 PROCEDURE — 700101 HCHG RX REV CODE 250

## 2017-06-15 PROCEDURE — 700101 HCHG RX REV CODE 250: Performed by: HOSPITALIST

## 2017-06-15 PROCEDURE — 93306 TTE W/DOPPLER COMPLETE: CPT | Mod: 26 | Performed by: INTERNAL MEDICINE

## 2017-06-15 PROCEDURE — 700101 HCHG RX REV CODE 250: Performed by: INTERNAL MEDICINE

## 2017-06-15 PROCEDURE — 700102 HCHG RX REV CODE 250 W/ 637 OVERRIDE(OP): Performed by: HOSPITALIST

## 2017-06-15 PROCEDURE — 84484 ASSAY OF TROPONIN QUANT: CPT

## 2017-06-15 PROCEDURE — A9270 NON-COVERED ITEM OR SERVICE: HCPCS | Performed by: HOSPITALIST

## 2017-06-15 PROCEDURE — 80048 BASIC METABOLIC PNL TOTAL CA: CPT | Mod: 91

## 2017-06-15 PROCEDURE — 700111 HCHG RX REV CODE 636 W/ 250 OVERRIDE (IP): Performed by: HOSPITALIST

## 2017-06-15 PROCEDURE — 700111 HCHG RX REV CODE 636 W/ 250 OVERRIDE (IP): Performed by: EMERGENCY MEDICINE

## 2017-06-15 PROCEDURE — 93005 ELECTROCARDIOGRAM TRACING: CPT | Performed by: INTERNAL MEDICINE

## 2017-06-15 PROCEDURE — 90935 HEMODIALYSIS ONE EVALUATION: CPT

## 2017-06-15 PROCEDURE — 700111 HCHG RX REV CODE 636 W/ 250 OVERRIDE (IP): Performed by: INTERNAL MEDICINE

## 2017-06-15 PROCEDURE — 700105 HCHG RX REV CODE 258: Performed by: HOSPITALIST

## 2017-06-15 PROCEDURE — 93306 TTE W/DOPPLER COMPLETE: CPT

## 2017-06-15 PROCEDURE — 700102 HCHG RX REV CODE 250 W/ 637 OVERRIDE(OP): Performed by: INTERNAL MEDICINE

## 2017-06-15 PROCEDURE — 87340 HEPATITIS B SURFACE AG IA: CPT

## 2017-06-15 PROCEDURE — 85027 COMPLETE CBC AUTOMATED: CPT

## 2017-06-15 PROCEDURE — 99239 HOSP IP/OBS DSCHRG MGMT >30: CPT | Performed by: HOSPITALIST

## 2017-06-15 PROCEDURE — 93010 ELECTROCARDIOGRAM REPORT: CPT | Performed by: INTERNAL MEDICINE

## 2017-06-15 PROCEDURE — 5A1D00Z PERFORMANCE OF URINARY FILTRATION, SINGLE: ICD-10-PCS | Performed by: HOSPITALIST

## 2017-06-15 RX ORDER — HYDRALAZINE HYDROCHLORIDE 100 MG/1
100 TABLET, FILM COATED ORAL 3 TIMES DAILY
Qty: 90 TAB | Refills: 2 | Status: SHIPPED | OUTPATIENT
Start: 2017-06-15 | End: 2018-03-22

## 2017-06-15 RX ORDER — OXYCODONE HYDROCHLORIDE 10 MG/1
10 TABLET ORAL EVERY 4 HOURS PRN
Status: DISCONTINUED | OUTPATIENT
Start: 2017-06-15 | End: 2017-06-15 | Stop reason: HOSPADM

## 2017-06-15 RX ORDER — METOPROLOL TARTRATE 50 MG/1
50 TABLET, FILM COATED ORAL ONCE
Status: COMPLETED | OUTPATIENT
Start: 2017-06-15 | End: 2017-06-15

## 2017-06-15 RX ORDER — DEXTROSE MONOHYDRATE 25 G/50ML
50 INJECTION, SOLUTION INTRAVENOUS ONCE
Status: DISCONTINUED | OUTPATIENT
Start: 2017-06-15 | End: 2017-06-15 | Stop reason: HOSPADM

## 2017-06-15 RX ORDER — SODIUM POLYSTYRENE SULFONATE 15 G/60ML
30 SUSPENSION ORAL; RECTAL ONCE
Status: DISCONTINUED | OUTPATIENT
Start: 2017-06-15 | End: 2017-06-15 | Stop reason: HOSPADM

## 2017-06-15 RX ORDER — HEPARIN SODIUM 1000 [USP'U]/ML
1500 INJECTION, SOLUTION INTRAVENOUS; SUBCUTANEOUS
Status: DISCONTINUED | OUTPATIENT
Start: 2017-06-15 | End: 2017-06-15 | Stop reason: HOSPADM

## 2017-06-15 RX ORDER — METOPROLOL TARTRATE 50 MG/1
150 TABLET, FILM COATED ORAL TWICE DAILY
Status: DISCONTINUED | OUTPATIENT
Start: 2017-06-15 | End: 2017-06-15 | Stop reason: HOSPADM

## 2017-06-15 RX ORDER — OXYCODONE HYDROCHLORIDE 5 MG/1
5 TABLET ORAL EVERY 4 HOURS PRN
Status: DISCONTINUED | OUTPATIENT
Start: 2017-06-15 | End: 2017-06-15 | Stop reason: HOSPADM

## 2017-06-15 RX ORDER — METOPROLOL TARTRATE 100 MG/1
100 TABLET ORAL 2 TIMES DAILY
Qty: 60 TAB | Refills: 3 | Status: SHIPPED | OUTPATIENT
Start: 2017-06-15 | End: 2018-03-22

## 2017-06-15 RX ADMIN — INSULIN HUMAN 8 UNITS: 100 INJECTION, SOLUTION PARENTERAL at 10:01

## 2017-06-15 RX ADMIN — LORAZEPAM 1 MG: 2 INJECTION INTRAMUSCULAR; INTRAVENOUS at 07:18

## 2017-06-15 RX ADMIN — ACETAMINOPHEN 650 MG: 325 TABLET, FILM COATED ORAL at 06:09

## 2017-06-15 RX ADMIN — PROCHLORPERAZINE EDISYLATE 10 MG: 5 INJECTION INTRAMUSCULAR; INTRAVENOUS at 13:42

## 2017-06-15 RX ADMIN — CLONIDINE HYDROCHLORIDE 0.3 MG: 0.1 TABLET ORAL at 08:26

## 2017-06-15 RX ADMIN — METOPROLOL TARTRATE 50 MG: 50 TABLET ORAL at 13:43

## 2017-06-15 RX ADMIN — HYDRALAZINE HYDROCHLORIDE 10 MG: 20 INJECTION INTRAMUSCULAR; INTRAVENOUS at 06:09

## 2017-06-15 RX ADMIN — NICARDIPINE HYDROCHLORIDE 25 MG: 25 INJECTION INTRAVENOUS at 00:07

## 2017-06-15 RX ADMIN — METOPROLOL TARTRATE 100 MG: 50 TABLET ORAL at 08:26

## 2017-06-15 RX ADMIN — HYDRALAZINE HYDROCHLORIDE 100 MG: 25 TABLET ORAL at 08:26

## 2017-06-15 RX ADMIN — ONDANSETRON 4 MG: 2 INJECTION INTRAMUSCULAR; INTRAVENOUS at 06:57

## 2017-06-15 RX ADMIN — NICARDIPINE HYDROCHLORIDE 10 MG/HR: 2.5 INJECTION INTRAVENOUS at 11:36

## 2017-06-15 RX ADMIN — NICARDIPINE HYDROCHLORIDE 10 MG/HR: 2.5 INJECTION INTRAVENOUS at 14:33

## 2017-06-15 RX ADMIN — HEPARIN SODIUM 1500 UNITS: 1000 INJECTION, SOLUTION INTRAVENOUS; SUBCUTANEOUS at 11:45

## 2017-06-15 RX ADMIN — ACETAMINOPHEN 650 MG: 325 TABLET, FILM COATED ORAL at 12:09

## 2017-06-15 RX ADMIN — RENAGEL 2400 MG: 800 TABLET ORAL at 11:55

## 2017-06-15 RX ADMIN — FENTANYL CITRATE 25 MCG: 50 INJECTION INTRAMUSCULAR; INTRAVENOUS at 13:43

## 2017-06-15 RX ADMIN — LORAZEPAM 1 MG: 2 INJECTION INTRAMUSCULAR; INTRAVENOUS at 06:27

## 2017-06-15 RX ADMIN — HYDRALAZINE HYDROCHLORIDE 100 MG: 25 TABLET ORAL at 15:29

## 2017-06-15 ASSESSMENT — PAIN SCALES - GENERAL
PAINLEVEL_OUTOF10: 9
PAINLEVEL_OUTOF10: 9
PAINLEVEL_OUTOF10: 0

## 2017-06-15 ASSESSMENT — LIFESTYLE VARIABLES
EVER_SMOKED: NEVER
ALCOHOL_USE: NO

## 2017-06-15 NOTE — PROGRESS NOTES
0700-Assumed care,  bedside report received. Patient resting in bed, anxious. Patient complains of continued headache. Educated patient that her headache is likely due to her elevated blood pressure and that her blood pressure is going up when she becomes anxious. Encouraged her to remain calm and use slow breathing. Patient requesting food, explained to her that she was recently medicated for nausea/vomiting and that we will hold off on food for 20-30 minutes.  All lines, tubes, and pumps checked and verified. All orders, labs, and medications reviewed. Plan of care discussed, questions and concerns addressed. Bed locked, fall precautions in place, call light within reach. Will continue to monitor.     0855-Patient was given morning blood pressure medications and breakfast. Patient vomited breakfast. Did not see any whole or digested pills in emesis. Attempted to give patient IV insulin and D50, however she refused these stating she only wants to take the kayexalate. Explained to patient that she just vomited 5 minutes ago and that she will likely not be able to keep down kayexalate. Patient continues to refuse and asks for kayexalate. She also began trying to eat her breakfast again, however this was taken away and explained that she cannot have foot at this time due to active vomiting. She is attempting to drink kayexalate at this time.     0920-Patient vomited all of kayexalate. Continues to refuse insulin and D50.  at bedside asking for more food for her. Attempted to explain that she is not keeping anything down and that she should not be eating at this time until her nausea and vomiting are under control. Despite her family speaking english, they do not understand this and continue to ask for food despite patient actively vomiting.     0945-Patient refused compazine ordered for nausea.  states that her stomach is better now and that she would like food. Hospitalist RN notified. Small cup of  peaches given to patient per constant request despite repeated recommendation not to eat due to vomiting.     1010-Patient able to keep down a few bites of peaches. Again educated patient and family about insulin and D50 need, she agreed to take it at this time.     1400-Patient continues to complain of headache despite BP WNL on nicardipine drip. Patient medicated for pain and nausea before metoprolol will be given.     1500-Dialysis completed. Patient states she wants to sign herself out of the hospital. Explained to her that if we turn off her IV BP medication her blood pressure will go back up to where it was before putting her at risk for stroke, heart attack, and death. MD notified of patients wishes, she will be coming to bedside shortly to speak with patient. IXI-Play tech at bedside for Echo at this time.    1520-Attempting to titrate nicardipine off to see what patients blood pressure does if she will be leaving AMA. Titrated down to 7.5 mg/hr    1527-Nicardipine drip stopped per MD order    1550-Dr. Merritt at bedside. Spoke with patient about staying until after dialysis tomorrow morning then being discharged. Patient agreeable to stay. Wants to get up and walk hallway. Patient placed on portable telemetry box, MD aware of patient up walking.     1750-Patient requesting to leave against medical advice again. States she will just plan to go to dialysis in the morning instead of staying here tonight and leaving after dialysis. Explained to patient that the doctor wants to monitor her blood pressure over night to make sure it remains stable off the nicardipine drip. Patient and family verbalize understanding but continue to request that she leaves.     1800-Dr. Ling notified of patients request to leave AMA. Blood pressure 160/99 at this time. Order received to discharge patient.     1828-Educated patient regarding all discharge instructions, medications, and new prescriptions. Discharge instructions signed and  copy given to patient. IV DCd. All belongings, instructions, Rxs, and educational materials with patient. Patient escorted by family via walking out of building, declined escort and wheelchair. Patient stable. Care relinquished.

## 2017-06-15 NOTE — PROGRESS NOTES
1920: Received pt from ER. Pt AAOx4. Pt c/o 7/10 HA. BP still running 200s/100s. Assessment complete. Oriented pt to room and updated pt on POC. Reminded pt to call for assistance. Call light within reach, bed in lowest position, bed alarm on, will continue to monitor.    1940: Gave pt scheduled BP meds and a sandwich for dinner per pt's request.    2020: Pt continued to c/o HA. Pt given tylenol. Nicardipine drip started at 5 mg/hr. Pt very anxious about receiving new BP meds. Pt given ativan.    2045: Pt shaking, c/o nausea, and tachycardic in the 120s. Pt concerned that she is having an adverse reaction to the BP meds. Pt appears to be having a panic attack. Paged Dr. Walsh for orders. This RN also had a Swedish speaking RN explain situation and meds to pt.    2100: Dr. Walsh called back. New orders received for zofran prn and increased dose of ativan.    2110: Gave pt zofran and another dose of ativan for a total of 2mg.     2130: Pt appears more calm. BP down 169/98 with nicard running at 7.5 mg/hr.     2150: Attempted to give pt scheduled PO meds. Pt immediately vomited them up.    2240: Troponin drawn. Pt said she was feeling better now.     0000: Woke pt up for assessment, Pt denied any pain or nausea. SBP <140. Nicardipine currently running at 9 mg/hr. Will titrate nicardipine down.      0300: Turned nicardipine drip off.     0400: Woke pt up for assessment. Pt continued to deny any pain or nausea. AM labs drawn and sent.

## 2017-06-15 NOTE — H&P
DATE OF ADMISSION:  2017    CHIEF COMPLAINT:  Headache and uncontrolled blood pressure.    PRIMARY CARE PHYSICIAN:  Quinton Saldana MD.    PRIMARY NEPHROLOGIST:  Jose Enrique Padilla MD.  Admitted to [facility] Hospitalist Dr. Walsh.    DIAGNOSIS:  Hypertensive urgency.  Patient at this point will be admitted to   the ICU for IV nicardipine drip to control her blood pressure.    HISTORY OF CURRENT ILLNESS:  The patient is a 37-year-old female who is on   end-stage dialysis, patient has developed uncontrolled blood pressure today   because she apparently did not take her medications.  She forgot to take the   proper dose of hydralazine and a proper dose of metoprolol.  The patient's   blood pressure dialysis already increased.  The patient then came to the   emergency room.  Here, she was evaluated.  When she was given hydralazine at   first it seemed to work, but then her heart rate went up and her blood   pressure started coming down.  Later on, however, she had rebound   hypertension.  Currently, she is over 200 on her systolic and over 120 on her   diastolic.  Patient at this point will be admitted to the ICU for controlling   of her blood pressure and reinitiation of her medications.    PAST MEDICAL HISTORY:  In the patient includes end-stage renal failure for the   past 12 years secondary to medications, hypertension.    PAST SURGICAL HISTORY:  Includes , AV fistula on the left arm and   pericardiocentesis.    ALLERGIES:  SHE IS ALLERGIC TO IODINE.  SHE IS ALLERGIC ALSO TO LABETALOL AND   MORPHINE.    MEDICATIONS:  At the time of admission include Renvela 800 mg x3 tablets   t.i.d. with meals, metoprolol 100 mg twice daily, hydralazine 100 mg 3 times   daily, clonidine 0.3 mg twice daily., vitamin B complex daily, aspirin 81 mg   p.o. daily, and Tylenol p.r.n.    SOCIAL HISTORY:  She has never smoked.  No drugs.  No alcohol.  She has an   advanced directive.  She is full code status    FAMILY HISTORY:   Both mom and dad are healthy.    REVIEW OF SYSTEMS:  She currently complains of headache, complains of some   chest discomfort and dizziness.  No nausea, vomiting, no chest pain, no   shortness of breath.  No abdominal pain.  No leg swelling.  No loss of   appetite.  All other review of systems reviewed and are negative.    PHYSICAL EXAMINATION:  VITAL SIGNS:  Temperature 36.9, pulse 112, respirations 27, blood pressure   220/132.  Weight is 59 kilograms with a height of 167 cm.  GENERAL:  She is currently alert, awake, oriented x3, pleasant, cooperative   female appears her stated age, cooperative.  HEENT:  Head atraumatic.  Eyes follow normal range of gaze.  Pupils are equal,   round, reactive bilaterally.  Nose midline without discharge.  Ears   bilaterally intact without discharge.  Oral cavity, moist mucous membranes.    No apparent focus infection in the oral cavity.  NECK:  Soft and supple.  No JVD, carotid bruit, thyromegaly or lymphadenopathy   appreciated.  CHEST WALL:  Moves equal with inspiration and expiration.  No paradoxical   motion.  No reproducible chest wall tenderness.  HEART:  S1, S2.  No murmurs or gallops detected.  LUNGS:  Clear to auscultation.  No rales or rhonchi detected.  ABDOMEN:  Soft.  Bowel sounds present in all 4 quadrants.  No hepatomegaly,   splenomegaly, rebound, no tenderness elicited.  GENITAL:  Exam deferred.  GENITOURINARY AND RECTAL:  Deferred.  EXTREMITIES:  Upper and lower extremities, positive pulses, no edema.  Good   muscle strength, good muscle tone.  Positive deep tendon reflexes.  She has an   AV fistula, which is with a good bruit in the left forearm.  SKIN:  Normal turgor.  No rashes or abrasions identified.  NEUROLOGIC:  Cranial nerves II-XII grossly within normal limits without any   focal deficits appreciated.    LABORATORY EVALUATION:  WBC count 3.6, hemoglobin is 12.3, hematocrit 37.4,   platelets are 160.  Sodium 130, potassium 4.3, chloride 95, CO2 of 28,  BUN 31,   creatinine 5.1 with a calcium 9.2 and a glucose of 101.  Liver function tests   are within normal limits.  INR is 1.  Troponin is less than 0.02.  Chest   x-ray at this point shows no acute cardiopulmonary process identified.  I   reviewed the chest x-ray myself.  CT of the head shows no acute intracranial   process identified.    IMPRESSION AND PLAN:  1.  Hypertensive urgency.  The patient at this point has been trialed with   clonidine, hydralazine and metoprolol.  Patient will continue with these.  We   will add at this point, nicardipine drip.  She will be admitted to the ICU   under close monitoring.  Patient will at this point have a nicardipine   titrated to get her blood pressure to under 140 systolic.  We will get an   echocardiogram.  We will also get serial troponins.  At this point, the   patient will have p.r.n. enalapril as well.  I will talk with nephrology   regarding her dialysis tomorrow.  She will need a PUF according to what they   told her, thus at this point, the patient will need to have dialysis arranged.  2.  For her hyperphosphatemia, continue Renvela and for her vitamin   deficiency, continue with Dialyvite daily.  3.  Patient will be on deep venous thrombosis and gastroesophageal reflux   disease prophylaxis.  Patient at this point is full admission to the ICU.    Patient will require more than 2 midnights of inpatient stay given the fact   that she is with hypertensive urgency.    Critical Care time 45 minutes, no procedures, no interventions, no overlap       ____________________________________     MD GIOVANNA CANELA / SAKINA    DD:  06/14/2017 18:41:01  DT:  06/14/2017 19:18:49    D#:  [id number]  Job#:  [id number]    cc: Quinton Saldana MD, JENNA MIKE MD

## 2017-06-15 NOTE — DISCHARGE PLANNING
Medical Social Work    Referral: Pt discussed at IDT rounds this AM.    Intervention: Per keshia, pt lives with spouse and expects to d.c home.  No SS needs identified nor any requests for MING Ruiz during rounds.      Plan: MING available for any assistance with d.c planning.    Care Transition Team Assessment    Information Source  Orientation : Oriented x 4  Information Given By: Patient    Readmission Evaluation  Is this a readmission?: No    Elopement Risk  Legal Hold: No  Ambulatory or Self Mobile in Wheelchair: Yes  Disoriented: No  Psychiatric Symptoms: None  History of Wandering: No  Elopement this Admit: No  Vocalizing Wanting to Leave: No  Displays Behaviors, Body Language Wanting to Leave: No-Not at Risk for Elopement  Elopement Risk: Not at Risk for Elopement    Interdisciplinary Discharge Planning  Does Admitting Nurse Feel This Could be a Complex Discharge?: No  Primary Care Physician: Dr. Saldana  Lives with - Patient's Self Care Capacity: Spouse  Support Systems: Friends / Neighbors  Housing / Facility: 1 Story Apartment / Condo  Do You Take your Prescribed Medications Regularly: Yes  Able to Return to Previous ADL's: Yes  Mobility Issues: No  Prior Services: Home-Independent  Patient Expects to be Discharged to:: home  Assistance Needed: No  Durable Medical Equipment: Not Applicable    Discharge Preparedness  What is your plan after discharge?: Uncertain - pending medical team collaboration  What are your discharge supports?: Spouse         Finances  Prescription Coverage: Yes    Vision / Hearing Impairment  Vision Impairment : No  Hearing Impairment : No         Advance Directive  Advance Directive?: None    Domestic Abuse  Have you ever been the victim of abuse or violence?: No    Psychological Assessment  History of Substance Abuse: None         Anticipated Discharge Information  Anticipated discharge disposition: Home

## 2017-06-15 NOTE — CARE PLAN
Problem: Pain Management  Goal: Pain level will decrease to patient’s comfort goal  Outcome: PROGRESSING AS EXPECTED  Pt denied HA after ativan and tylenol was given and BP came down.     Problem: Hemodynamic Status  Goal: Vital Signs and Fluid Balance Management  Intervention: Hemodynamic Monitoring  Nicardipine drip currently down to 2 mg/hr. BPs are running 120-130s/70s.

## 2017-06-15 NOTE — PROGRESS NOTES
HEMODIALYSIS NOTES:     HD today x 3 hours per Dr. Merritt. Initiated at 1135 and ended at 1435. Patient stable, complained of headache , nausea, vomiting, uneasiness, uncomfortable prior the start of treatment. SBP high, monitored accordingly and BP medications given by ICU RN. Patient tolerated treatment. Please see paper flowsheet for details.    UF Net: 3,000 mL    Blood returned. Applied gauze and held L AVF site for 15 minutes. Verified no bleeding. Bruit and thrill present post dialysis. Instructions given to Primary RN that if bleeding occurs on the AVF site, change dressing and held the site with pressure. Report given to DEMARIO Suarez RN.

## 2017-06-16 NOTE — DISCHARGE INSTRUCTIONS
Discharge Instructions    Discharged to home by car with relative. Discharged via walking, hospital escort: Refused.  Special equipment needed: Not Applicable    Be sure to schedule a follow-up appointment with your primary care doctor or any specialists as instructed.     Discharge Plan:   Influenza Vaccine Indication: Not indicated: Previously immunized this influenza season and > 8 years of age    I understand that a diet low in cholesterol, fat, and sodium is recommended for good health. Unless I have been given specific instructions below for another diet, I accept this instruction as my diet prescription.   Other diet: Renal diet    Continue Dialysis Monday, Wednesday, Friday as scheduled.   Follow up with Primary care doctor in 1 week.   Follow up with nephrologist in 1 week.    Hipertensión  (Hypertension)  El término hipertensión es otra forma de denominar a la presión arterial elevada. La presión arterial elevada fuerza al corazón a trabajar más para bombear la tavo. Amisha lectura de la presión arterial consta de dos números: marquis más alto sobre marquis más bajo (por ejemplo, 110/72).  CUIDADOS EN EL HOGAR   · Dc que el médico le tome nuevamente la presión arterial.  · [place] los medicamentos solamente daja se lo haya indicado el médico. Siga cuidadosamente las indicaciones. Los medicamentos pierden eficacia si omite dosis. El hecho de omitir las dosis también aumenta el riesgo de otros problemas.  · No fume.  · Contrólese la presión arterial en sher casa daja se lo haya indicado el médico.  SOLICITE AYUDA SI:  · Piensa que tiene amisha reacción a los medicamentos que está tomando.  · Tiene mareos o trinity de haleigh reiterados.  · Se le inflaman (hinchan) los tobillos.  · Tiene problemas de visión.  SOLICITE AYUDA DE INMEDIATO SI:   · Tiene un dolor de haleigh muy intenso y está confundido.  · Se siente débil, aturdido o se desmaya.  · Tiene dolor en el pecho o el estómago (abdominal).  · Tiene vómitos.  · No puede  respirar muy anirudh.  ASEGÚRESE DE QUE:   · Comprende estas instrucciones.  · Controlará sher afección.  · Recibirá ayuda de inmediato si no mejora o si empeora.     Esta información no tiene daja fin reemplazar el consejo del médico. Asegúrese de hacerle al médico cualquier pregunta que tenga.     Document Released: 06/07/2011 Document Revised: 12/23/2014  [employer] Interactive Patient Education ©2016 [employer]      Special Instructions: None    · Is patient discharged on Warfarin / Coumadin?   No     · Is patient Post Blood Transfusion?  No    Depression / Suicide Risk    As you are discharged from this [facility] facility, it is important to learn how to keep safe from harming yourself.    Recognize the warning signs:  · Abrupt changes in personality, positive or negative- including increase in energy   · Giving away possessions  · Change in eating patterns- significant weight changes-  positive or negative  · Change in sleeping patterns- unable to sleep or sleeping all the time   · Unwillingness or inability to communicate  · Depression  · Unusual sadness, discouragement and loneliness  · Talk of wanting to die  · Neglect of personal appearance   · Rebelliousness- reckless behavior  · Withdrawal from people/activities they love  · Confusion- inability to concentrate     If you or a loved one observes any of these behaviors or has concerns about self-harm, here's what you can do:  · Talk about it- your feelings and reasons for harming yourself  · Remove any means that you might use to hurt yourself (examples: pills, rope, extension cords, firearm)  · Get professional help from the community (Mental Health, Substance Abuse, psychological counseling)  · Do not be alone:Call your Safe Contact- someone whom you trust who will be there for you.  · Call your local CRISIS HOTLINE 553-3555 or 860-124-8775  · Call your local Children's Mobile Crisis Response Team [place] (659) 192-6478 or  www.[employer]  · Call the toll free National Suicide Prevention Hotlines   · National Suicide Prevention Lifeline 622-258-EEXE (8035)  · National Hope Line Network 800-SUICIDE (656-8086)

## 2017-06-16 NOTE — CONSULTS
DATE OF SERVICE:  06/15/2017    CONSULT REQUESTING PHYSICIAN:  Mara Walsh MD.    REASON FOR CONSULTATION:  To evaluate and provide dialysis for patient with   end-stage renal disease and hyperkalemia.    HISTORY OF PRESENT ILLNESS:  The patient is a 37-year-old female with   end-stage renal disease, on hemodialysis, poorly controlled hypertension,    admitted with hypertensive urgency, also found to have a potassium   significantly elevated up to 6.4, undergoing dialysis without difficulties.    Currently he is doing better, has no complaints, wants to go home, blood   pressure is better with nicardipine drip.  Denies headache or dizziness.  No   fever or chills.  No chest pain or shortness of breath.  No abdominal pain.    No nausea or vomiting.    PAST MEDICAL HISTORY:  End-stage renal disease, on hemodialysis; hypertension,   and stroke.    PAST SURGICAL HISTORY:  , AV fistula creation, and   pericardiocentesis.    ALLERGIES:  ALLERGIC TO IODINE, LABETALOL AND MORPHINE.    MEDICATIONS:  1.  Renvela.  2.  Metoprolol.  3.  Hydralazine.  4.  Clonidine.  5.  Vitamin B complex.  6.  Aspirin.  7.  Tylenol.    SOCIAL HISTORY:  No tobacco, alcohol or drugs.    FAMILY HISTORY:  No history of kidney disease.    PHYSICAL EXAMINATION:  VITAL SIGNS:  Blood pressure is 160/98 and heart rate 123.  GENERAL APPEARANCE:  A well-nourished female, in no acute distress.  HEENT:  Normocephalic, atraumatic.  Pupils are equal, round, and reactive to   light.  Extraocular movements are intact.  Nares are patent.  Oropharynx is   clear, moist mucosa, no erythema or exudates.  NECK:  Supple.  No lymphadenopathy, no thyromegaly appreciated.  LUNGS:  Clear to auscultation bilaterally.  No wheezes, rales, or rhonchi.  HEART:  Regular rate.  No rub or gallop.  ABDOMEN:  Soft, nontender, and nondistended.  Bowel sounds are present.  EXTREMITIES:  No cyanosis, clubbing or edema.  NEUROLOGIC:  Alert and oriented x3.  No focal  deficits.    LABORATORY RESULTS:  Sodium 137, potassium 6.4, BUN 38 and creatinine 6.98.    ASSESSMENT AND PLAN:  The patient is a 37-year-old female with end-stage renal   disease, on hemodialysis and poorly controlled hypertension.  1.  End-stage renal disease.  The patient is scheduled for dialysis today to   correct elevated potassium.  Continue as per schedule on Monday, Wednesday,   and Friday.  2.  Electrolytes.  Hyperkalemia will be correcting emergently with dialysis.  3.  Volume.  Do UF with hemodialysis as blood pressure tolerates.  4.  Anemia.  Hemoglobin level at goal.  5.  Hypertension emergency, on nicardipine drip, improving.    RECOMMENDATIONS:    DIET:  Renal.    Hemodialysis on Monday, Wednesday, and Friday.  To monitor basic metabolic   panel, to provide low potassium diet.  We will follow the patient closely.    Thank you for the consult.       ____________________________________     MD PARADISE KING / SAKINA    DD:  06/15/2017 16:21:39  DT:  06/15/2017 19:41:14    D#:  [id number]  Job#:  [id number]

## 2017-06-16 NOTE — DISCHARGE SUMMARY
CHIEF COMPLAINT ON ADMISSION  Chief Complaint   Patient presents with   • Blood Pressure Problem     blood pressure and heart rate high  chest pain  Just had dialysis every M,W,F       CODE STATUS  Full Code    HPI & HOSPITAL COURSE  This is a 37 y.o. female admitted 6/14/17 with HA and uncontrolled hypertension.  She has a history of noncompliance with her blood pressure medications with repeat admissions for the same.  She has ESRD on HD MWF with left AVF.  Nephrology was consulted for hyperkalemia and acute fluid overload, 3 liters of fluid removed with improvement of blood pressure and headache.  Her potassium normalized.  She remained ST with prolonged QTI.  She initially was admitted to ICU overnight on cardizem drip, home metoprolol and hydralazine.  She was discontinued on her clonidine for fear of rebound hypertension.  Blood pressure at discharge 138/87.  Despite recommending to stay to ensure blood pressure control and repeat HD in a.m., patient threatened to leave AMA.  Continue metoprolol and hydralazine BP meds, compliance was greatly recommended.  Refills sent to her pharmacy.    Therefore, she is discharged in good and stable condition with close outpatient follow-up.    SPECIFIC OUTPATIENT FOLLOW-UP  Follow up PCP in 1 week.  Follow up nephrologist in 1 week.  Continue HD MWF.    DISCHARGE PROBLEM LIST  Principal Problem (Resolved):    Hypertensive urgency POA: Yes  Active Problems:    Noncompliance POA: Yes    ESRD (end stage renal disease) on dialysis (CMS-[facility]) POA: Yes      Overview: HD this morning for recurrent hyperkalemia    Anemia of chronic disease POA: Yes    Prolonged Q-T interval on ECG POA: Yes    Essential hypertension POA: Yes  Resolved Problems:    Fluid overload POA: Yes    Elevated brain natriuretic peptide (BNP) level POA: Yes    Hyperkalemia POA: Yes      FOLLOW UP  No future appointments.  [facility]  [id number]    In 1 day  Keep appointment tomorrow morning for  dialysis    Quinton Saldana M.D.  [address]  [address]  [facility] [address]  [phone]    Schedule an appointment as soon as possible for a visit in 1 week        MEDICATIONS ON DISCHARGE   Mago Billings   Home Medication Instructions SANJUANITA:[id number]    Printed on:06/15/17 1812   Medication Information                      acetaminophen (TYLENOL) 325 MG Tab  Take 325 mg by mouth every 6 hours as needed for Moderate Pain.             aspirin EC (ECOTRIN) 81 MG Tablet Delayed Response  Take 81 mg by mouth as needed (For headache).             B Complex-C-Folic Acid (DIALYVITE 800 PO)  Take 1 Tab by mouth every evening.             hydrALAZINE (APRESOLINE) 100 MG tablet  Take 1 Tab by mouth 3 times a day. Do not take if your blood pressure is <100/60             metoprolol (LOPRESSOR) 100 MG Tab  Take 1 Tab by mouth 2 times a day.             Sevelamer Carbonate (RENVELA) 800 MG Tab  Take 2,400 mg by mouth 3 times a day, with meals.                 DIET  Orders Placed This Encounter   Procedures   • Diet Order     Standing Status: Standing      Number of Occurrences: 1      Standing Expiration Date:      Order Specific Question:  Diet:     Answer:  Regular [1]       ACTIVITY  As tolerated.  Weight bearing as tolerated      CONSULTATIONS  Dr. Merritt: nephrology    PROCEDURES  Emergency hemodialysis with PUF 3 L off.    LABORATORY  Lab Results   Component Value Date/Time    SODIUM 137 06/15/2017 11:10 AM    POTASSIUM 4.9 06/15/2017 11:10 AM    CHLORIDE 96 06/15/2017 11:10 AM    CO2 27 06/15/2017 11:10 AM    GLUCOSE 119* 06/15/2017 11:10 AM    BUN 41* 06/15/2017 11:10 AM    CREATININE 8.07* 06/15/2017 11:10 AM        Lab Results   Component Value Date/Time    WBC 6.7 06/15/2017 04:15 AM    HEMOGLOBIN 10.9* 06/15/2017 04:15 AM    HEMATOCRIT 33.1* 06/15/2017 04:15 AM    PLATELET COUNT 150* 06/15/2017 04:15 AM        Total time of the discharge process exceeds 45 minutes

## 2017-07-19 NOTE — ADDENDUM NOTE
Encounter addended by: Mara Walsh M.D. on: 7/19/2017 12:01 PM<BR>     Documentation filed: Clinical Notes

## 2017-08-02 ENCOUNTER — APPOINTMENT (OUTPATIENT)
Dept: RADIOLOGY | Facility: MEDICAL CENTER | Age: 37
DRG: 304 | End: 2017-08-02
Attending: EMERGENCY MEDICINE
Payer: COMMERCIAL

## 2017-08-02 ENCOUNTER — APPOINTMENT (OUTPATIENT)
Dept: RADIOLOGY | Facility: MEDICAL CENTER | Age: 37
DRG: 304 | End: 2017-08-02
Attending: INTERNAL MEDICINE
Payer: COMMERCIAL

## 2017-08-02 ENCOUNTER — RESOLUTE PROFESSIONAL BILLING HOSPITAL PROF FEE (OUTPATIENT)
Dept: HOSPITALIST | Facility: MEDICAL CENTER | Age: 37
End: 2017-08-02
Payer: COMMERCIAL

## 2017-08-02 ENCOUNTER — HOSPITAL ENCOUNTER (INPATIENT)
Facility: MEDICAL CENTER | Age: 37
LOS: 3 days | DRG: 304 | End: 2017-08-05
Attending: EMERGENCY MEDICINE | Admitting: INTERNAL MEDICINE
Payer: COMMERCIAL

## 2017-08-02 DIAGNOSIS — I16.0 HYPERTENSIVE URGENCY: ICD-10-CM

## 2017-08-02 DIAGNOSIS — R51.9 ACUTE NONINTRACTABLE HEADACHE, UNSPECIFIED HEADACHE TYPE: ICD-10-CM

## 2017-08-02 PROBLEM — R51 HEADACHE(784.0): Status: ACTIVE | Noted: 2017-08-02

## 2017-08-02 PROBLEM — I16.1 HYPERTENSIVE EMERGENCY: Status: ACTIVE | Noted: 2017-08-02

## 2017-08-02 LAB
ALBUMIN SERPL BCP-MCNC: 4.1 G/DL (ref 3.2–4.9)
ALBUMIN/GLOB SERPL: 1.4 G/DL
ALP SERPL-CCNC: 71 U/L (ref 30–99)
ALT SERPL-CCNC: 19 U/L (ref 2–50)
ANION GAP SERPL CALC-SCNC: 8 MMOL/L (ref 0–11.9)
AST SERPL-CCNC: 13 U/L (ref 12–45)
BASOPHILS # BLD AUTO: 0.7 % (ref 0–1.8)
BASOPHILS # BLD: 0.03 K/UL (ref 0–0.12)
BILIRUB SERPL-MCNC: 0.6 MG/DL (ref 0.1–1.5)
BUN SERPL-MCNC: 30 MG/DL (ref 8–22)
CALCIUM SERPL-MCNC: 9.4 MG/DL (ref 8.5–10.5)
CHLORIDE SERPL-SCNC: 97 MMOL/L (ref 96–112)
CO2 SERPL-SCNC: 33 MMOL/L (ref 20–33)
CREAT SERPL-MCNC: 5.26 MG/DL (ref 0.5–1.4)
EOSINOPHIL # BLD AUTO: 0.15 K/UL (ref 0–0.51)
EOSINOPHIL NFR BLD: 3.4 % (ref 0–6.9)
ERYTHROCYTE [DISTWIDTH] IN BLOOD BY AUTOMATED COUNT: 43 FL (ref 35.9–50)
GFR SERPL CREATININE-BSD FRML MDRD: 9 ML/MIN/1.73 M 2
GLOBULIN SER CALC-MCNC: 3 G/DL (ref 1.9–3.5)
GLUCOSE SERPL-MCNC: 110 MG/DL (ref 65–99)
HCT VFR BLD AUTO: 28.6 % (ref 37–47)
HGB BLD-MCNC: 9.4 G/DL (ref 12–16)
IMM GRANULOCYTES # BLD AUTO: 0 K/UL (ref 0–0.11)
IMM GRANULOCYTES NFR BLD AUTO: 0 % (ref 0–0.9)
LYMPHOCYTES # BLD AUTO: 0.4 K/UL (ref 1–4.8)
LYMPHOCYTES NFR BLD: 9 % (ref 22–41)
MCH RBC QN AUTO: 30 PG (ref 27–33)
MCHC RBC AUTO-ENTMCNC: 32.9 G/DL (ref 33.6–35)
MCV RBC AUTO: 91.4 FL (ref 81.4–97.8)
MONOCYTES # BLD AUTO: 0.32 K/UL (ref 0–0.85)
MONOCYTES NFR BLD AUTO: 7.2 % (ref 0–13.4)
NEUTROPHILS # BLD AUTO: 3.56 K/UL (ref 2–7.15)
NEUTROPHILS NFR BLD: 79.7 % (ref 44–72)
NRBC # BLD AUTO: 0 K/UL
NRBC BLD AUTO-RTO: 0 /100 WBC
PLATELET # BLD AUTO: 131 K/UL (ref 164–446)
PMV BLD AUTO: 10.5 FL (ref 9–12.9)
POTASSIUM SERPL-SCNC: 3.8 MMOL/L (ref 3.6–5.5)
PROT SERPL-MCNC: 7.1 G/DL (ref 6–8.2)
RBC # BLD AUTO: 3.13 M/UL (ref 4.2–5.4)
SODIUM SERPL-SCNC: 138 MMOL/L (ref 135–145)
TROPONIN I SERPL-MCNC: 0.03 NG/ML (ref 0–0.04)
TROPONIN I SERPL-MCNC: <0.01 NG/ML (ref 0–0.04)
TSH SERPL DL<=0.005 MIU/L-ACNC: 1.13 UIU/ML (ref 0.3–3.7)
WBC # BLD AUTO: 4.5 K/UL (ref 4.8–10.8)

## 2017-08-02 PROCEDURE — 700111 HCHG RX REV CODE 636 W/ 250 OVERRIDE (IP): Performed by: EMERGENCY MEDICINE

## 2017-08-02 PROCEDURE — 700111 HCHG RX REV CODE 636 W/ 250 OVERRIDE (IP): Performed by: INTERNAL MEDICINE

## 2017-08-02 PROCEDURE — 99223 1ST HOSP IP/OBS HIGH 75: CPT | Performed by: INTERNAL MEDICINE

## 2017-08-02 PROCEDURE — 70450 CT HEAD/BRAIN W/O DYE: CPT

## 2017-08-02 PROCEDURE — 80053 COMPREHEN METABOLIC PANEL: CPT

## 2017-08-02 PROCEDURE — 84443 ASSAY THYROID STIM HORMONE: CPT

## 2017-08-02 PROCEDURE — 85025 COMPLETE CBC W/AUTO DIFF WBC: CPT

## 2017-08-02 PROCEDURE — 700101 HCHG RX REV CODE 250: Performed by: EMERGENCY MEDICINE

## 2017-08-02 PROCEDURE — 96375 TX/PRO/DX INJ NEW DRUG ADDON: CPT

## 2017-08-02 PROCEDURE — 700102 HCHG RX REV CODE 250 W/ 637 OVERRIDE(OP): Performed by: INTERNAL MEDICINE

## 2017-08-02 PROCEDURE — 99291 CRITICAL CARE FIRST HOUR: CPT

## 2017-08-02 PROCEDURE — 96365 THER/PROPH/DIAG IV INF INIT: CPT

## 2017-08-02 PROCEDURE — 700101 HCHG RX REV CODE 250: Performed by: INTERNAL MEDICINE

## 2017-08-02 PROCEDURE — 700105 HCHG RX REV CODE 258: Performed by: INTERNAL MEDICINE

## 2017-08-02 PROCEDURE — 84484 ASSAY OF TROPONIN QUANT: CPT

## 2017-08-02 PROCEDURE — 96376 TX/PRO/DX INJ SAME DRUG ADON: CPT

## 2017-08-02 PROCEDURE — 770022 HCHG ROOM/CARE - ICU (200)

## 2017-08-02 PROCEDURE — 93005 ELECTROCARDIOGRAM TRACING: CPT

## 2017-08-02 PROCEDURE — 93005 ELECTROCARDIOGRAM TRACING: CPT | Performed by: EMERGENCY MEDICINE

## 2017-08-02 PROCEDURE — 36415 COLL VENOUS BLD VENIPUNCTURE: CPT

## 2017-08-02 PROCEDURE — A9270 NON-COVERED ITEM OR SERVICE: HCPCS | Performed by: INTERNAL MEDICINE

## 2017-08-02 RX ORDER — LABETALOL HYDROCHLORIDE 5 MG/ML
10 INJECTION, SOLUTION INTRAVENOUS EVERY 4 HOURS PRN
Status: DISCONTINUED | OUTPATIENT
Start: 2017-08-02 | End: 2017-08-02

## 2017-08-02 RX ORDER — HYDRALAZINE HYDROCHLORIDE 20 MG/ML
10 INJECTION INTRAMUSCULAR; INTRAVENOUS ONCE
Status: DISCONTINUED | OUTPATIENT
Start: 2017-08-02 | End: 2017-08-02

## 2017-08-02 RX ORDER — ONDANSETRON 2 MG/ML
4 INJECTION INTRAMUSCULAR; INTRAVENOUS EVERY 4 HOURS PRN
Status: DISCONTINUED | OUTPATIENT
Start: 2017-08-02 | End: 2017-08-05 | Stop reason: HOSPADM

## 2017-08-02 RX ORDER — ONDANSETRON 4 MG/1
4 TABLET, ORALLY DISINTEGRATING ORAL EVERY 4 HOURS PRN
Status: DISCONTINUED | OUTPATIENT
Start: 2017-08-02 | End: 2017-08-05 | Stop reason: HOSPADM

## 2017-08-02 RX ORDER — HYDRALAZINE HYDROCHLORIDE 25 MG/1
100 TABLET, FILM COATED ORAL 3 TIMES DAILY
Status: DISCONTINUED | OUTPATIENT
Start: 2017-08-02 | End: 2017-08-05 | Stop reason: HOSPADM

## 2017-08-02 RX ORDER — CLONIDINE HYDROCHLORIDE 0.3 MG/1
0.3 TABLET ORAL 2 TIMES DAILY
COMMUNITY
End: 2018-02-08 | Stop reason: SDUPTHER

## 2017-08-02 RX ORDER — SEVELAMER HYDROCHLORIDE 800 MG/1
800 TABLET, FILM COATED ORAL DAILY
Status: DISCONTINUED | OUTPATIENT
Start: 2017-08-03 | End: 2017-08-05 | Stop reason: HOSPADM

## 2017-08-02 RX ORDER — METOPROLOL TARTRATE 50 MG/1
100 TABLET, FILM COATED ORAL 2 TIMES DAILY
Status: DISCONTINUED | OUTPATIENT
Start: 2017-08-02 | End: 2017-08-05 | Stop reason: HOSPADM

## 2017-08-02 RX ORDER — PROMETHAZINE HYDROCHLORIDE 25 MG/1
12.5-25 TABLET ORAL EVERY 4 HOURS PRN
Status: DISCONTINUED | OUTPATIENT
Start: 2017-08-02 | End: 2017-08-05 | Stop reason: HOSPADM

## 2017-08-02 RX ORDER — HYDRALAZINE HYDROCHLORIDE 20 MG/ML
20 INJECTION INTRAMUSCULAR; INTRAVENOUS EVERY 6 HOURS PRN
Status: DISCONTINUED | OUTPATIENT
Start: 2017-08-02 | End: 2017-08-05 | Stop reason: HOSPADM

## 2017-08-02 RX ORDER — POLYETHYLENE GLYCOL 3350 17 G/17G
1 POWDER, FOR SOLUTION ORAL
Status: DISCONTINUED | OUTPATIENT
Start: 2017-08-02 | End: 2017-08-05 | Stop reason: HOSPADM

## 2017-08-02 RX ORDER — ONDANSETRON 2 MG/ML
4 INJECTION INTRAMUSCULAR; INTRAVENOUS ONCE
Status: COMPLETED | OUTPATIENT
Start: 2017-08-02 | End: 2017-08-02

## 2017-08-02 RX ORDER — CLONIDINE HYDROCHLORIDE 0.1 MG/1
0.3 TABLET ORAL 2 TIMES DAILY
Status: DISCONTINUED | OUTPATIENT
Start: 2017-08-02 | End: 2017-08-05 | Stop reason: HOSPADM

## 2017-08-02 RX ORDER — PROMETHAZINE HYDROCHLORIDE 12.5 MG/1
12.5-25 SUPPOSITORY RECTAL EVERY 4 HOURS PRN
Status: DISCONTINUED | OUTPATIENT
Start: 2017-08-02 | End: 2017-08-05 | Stop reason: HOSPADM

## 2017-08-02 RX ORDER — LORAZEPAM 1 MG/1
0.5 TABLET ORAL EVERY 6 HOURS PRN
Status: DISCONTINUED | OUTPATIENT
Start: 2017-08-02 | End: 2017-08-05 | Stop reason: HOSPADM

## 2017-08-02 RX ORDER — BISACODYL 10 MG
10 SUPPOSITORY, RECTAL RECTAL
Status: DISCONTINUED | OUTPATIENT
Start: 2017-08-02 | End: 2017-08-05 | Stop reason: HOSPADM

## 2017-08-02 RX ORDER — HYDRALAZINE HYDROCHLORIDE 20 MG/ML
20 INJECTION INTRAMUSCULAR; INTRAVENOUS ONCE
Status: COMPLETED | OUTPATIENT
Start: 2017-08-02 | End: 2017-08-02

## 2017-08-02 RX ORDER — ACETAMINOPHEN 325 MG/1
650 TABLET ORAL EVERY 6 HOURS PRN
Status: DISCONTINUED | OUTPATIENT
Start: 2017-08-02 | End: 2017-08-05 | Stop reason: HOSPADM

## 2017-08-02 RX ORDER — ONDANSETRON 2 MG/ML
4 INJECTION INTRAMUSCULAR; INTRAVENOUS EVERY 4 HOURS
Status: DISCONTINUED | OUTPATIENT
Start: 2017-08-03 | End: 2017-08-05 | Stop reason: HOSPADM

## 2017-08-02 RX ORDER — LORAZEPAM 2 MG/ML
0.5 INJECTION INTRAMUSCULAR EVERY 6 HOURS PRN
Status: DISCONTINUED | OUTPATIENT
Start: 2017-08-02 | End: 2017-08-05 | Stop reason: HOSPADM

## 2017-08-02 RX ORDER — HEPARIN SODIUM 5000 [USP'U]/ML
5000 INJECTION, SOLUTION INTRAVENOUS; SUBCUTANEOUS EVERY 8 HOURS
Status: DISCONTINUED | OUTPATIENT
Start: 2017-08-02 | End: 2017-08-05 | Stop reason: HOSPADM

## 2017-08-02 RX ORDER — SEVELAMER CARBONATE 800 MG/1
1 TABLET, FILM COATED ORAL DAILY
Status: DISCONTINUED | OUTPATIENT
Start: 2017-08-02 | End: 2017-08-02

## 2017-08-02 RX ORDER — AMOXICILLIN 250 MG
2 CAPSULE ORAL 2 TIMES DAILY
Status: DISCONTINUED | OUTPATIENT
Start: 2017-08-02 | End: 2017-08-05 | Stop reason: HOSPADM

## 2017-08-02 RX ADMIN — FENTANYL CITRATE 50 MCG: 50 INJECTION, SOLUTION INTRAMUSCULAR; INTRAVENOUS at 17:20

## 2017-08-02 RX ADMIN — HYDRALAZINE HYDROCHLORIDE 20 MG: 20 INJECTION INTRAMUSCULAR; INTRAVENOUS at 18:22

## 2017-08-02 RX ADMIN — FENTANYL CITRATE 50 MCG: 50 INJECTION, SOLUTION INTRAMUSCULAR; INTRAVENOUS at 19:29

## 2017-08-02 RX ADMIN — LABETALOL HYDROCHLORIDE 10 MG: 5 INJECTION INTRAVENOUS at 21:18

## 2017-08-02 RX ADMIN — METOPROLOL TARTRATE 5 MG: 5 INJECTION INTRAVENOUS at 15:01

## 2017-08-02 RX ADMIN — FENTANYL CITRATE 50 MCG: 50 INJECTION, SOLUTION INTRAMUSCULAR; INTRAVENOUS at 14:13

## 2017-08-02 RX ADMIN — ONDANSETRON 4 MG: 2 INJECTION INTRAMUSCULAR; INTRAVENOUS at 21:00

## 2017-08-02 RX ADMIN — METOPROLOL TARTRATE 5 MG: 5 INJECTION INTRAVENOUS at 14:42

## 2017-08-02 RX ADMIN — CLONIDINE HYDROCHLORIDE 0.3 MG: 0.1 TABLET ORAL at 21:10

## 2017-08-02 RX ADMIN — ONDANSETRON 4 MG: 2 INJECTION INTRAMUSCULAR; INTRAVENOUS at 19:30

## 2017-08-02 RX ADMIN — SODIUM CHLORIDE 15 MG/HR: 9 INJECTION, SOLUTION INTRAVENOUS at 22:28

## 2017-08-02 RX ADMIN — METOPROLOL TARTRATE 5 MG: 5 INJECTION INTRAVENOUS at 14:12

## 2017-08-02 RX ADMIN — HYDRALAZINE HYDROCHLORIDE 20 MG: 20 INJECTION INTRAMUSCULAR; INTRAVENOUS at 15:26

## 2017-08-02 ASSESSMENT — COPD QUESTIONNAIRES
DO YOU EVER COUGH UP ANY MUCUS OR PHLEGM?: NO/ONLY WITH OCCASIONAL COLDS OR INFECTIONS
DURING THE PAST 4 WEEKS HOW MUCH DID YOU FEEL SHORT OF BREATH: NONE/LITTLE OF THE TIME
HAVE YOU SMOKED AT LEAST 100 CIGARETTES IN YOUR ENTIRE LIFE: NO/DON'T KNOW
COPD SCREENING SCORE: 0

## 2017-08-02 ASSESSMENT — ENCOUNTER SYMPTOMS
TINGLING: 0
MYALGIAS: 1
WEAKNESS: 1
FLANK PAIN: 0
TREMORS: 0
ABDOMINAL PAIN: 0
PALPITATIONS: 0
NERVOUS/ANXIOUS: 0
DIZZINESS: 0
SORE THROAT: 0
CHILLS: 0
DEPRESSION: 1
NECK PAIN: 0
NAUSEA: 1
DOUBLE VISION: 0
SPEECH CHANGE: 0
SENSORY CHANGE: 0
EYE PAIN: 0
LOSS OF CONSCIOUSNESS: 0
DIAPHORESIS: 0
HEADACHES: 1
FOCAL WEAKNESS: 0
BLURRED VISION: 0
COUGH: 0
FEVER: 0
MEMORY LOSS: 0
VOMITING: 0
BACK PAIN: 0
SHORTNESS OF BREATH: 0

## 2017-08-02 ASSESSMENT — LIFESTYLE VARIABLES
ALCOHOL_USE: NO
EVER_SMOKED: NEVER

## 2017-08-02 ASSESSMENT — PAIN SCALES - GENERAL
PAINLEVEL_OUTOF10: 4
PAINLEVEL_OUTOF10: 9

## 2017-08-02 NOTE — IP AVS SNAPSHOT
" <p align=\"LEFT\"><IMG SRC=\"//[email or web address]\" alt=\"Image\" WIDTH=\"50%\" HEIGHT=\"200\" BORDER=\"\"></p>                   Name:Mago Farfan  Medical Record Number:[id number]  CSN: [id number]    YOB: 1980   Age: 37 y.o.  Sex: female  HT:1.7 m (5' 6.93\") WT: 56.3 kg (124 lb 1.9 oz)          Admit Date: 8/2/2017     Discharge Date:   Today's Date: 8/5/2017  Attending Doctor:  Bari Triana M.D.                  Allergies:  Iodine; Labetalol; Morphine; and Pork allergy          Follow-up Information     1. Follow up with Quinton Saldana M.D..    Specialty:  Family Medicine    Contact information    [facility] [address] [address]  [phone]           Medication List      Take these Medications        Instructions    aspirin EC 81 MG Tbec   Commonly known as:  ECOTRIN    Take 81 mg by mouth as needed (For headache).   Dose:  81 mg       clonidine 0.3 MG Tabs   Commonly known as:  CATAPRESS    Take 0.3 mg by mouth 2 times a day.   Dose:  0.3 mg       DIALYVITE 800 PO    Take 1 Tab by mouth every evening.   Dose:  1 Tab       hydrALAZINE 100 MG tablet   Commonly known as:  APRESOLINE    Take 1 Tab by mouth 3 times a day. Do not take if your blood pressure is <100/60   Dose:  100 mg       metoprolol 100 MG Tabs   Commonly known as:  LOPRESSOR    Take 1 Tab by mouth 2 times a day.   Dose:  100 mg       RENVELA 800 MG Tabs   Generic drug:  Sevelamer Carbonate    TAKE 1 TABLET BY MOUTH 3 TIMES DAILY         "

## 2017-08-02 NOTE — IP AVS SNAPSHOT
" Home Care Instructions                                                                                                                  Name:Mago Farfan  Medical Record Number:[id number]  CSN: [id number]    YOB: 1980   Age: 37 y.o.  Sex: female  HT:1.7 m (5' 6.93\") WT: 56.3 kg (124 lb 1.9 oz)          Admit Date: 8/2/2017     Discharge Date:   Today's Date: 8/5/2017  Attending Doctor:  Bari Triana M.D.                  Allergies:  Iodine; Labetalol; Morphine; and Pork allergy            Discharge Instructions       Discharge Instructions    Discharged to home by car with relative. Discharged via walking, hospital escort: Refused.  Special equipment needed: Not Applicable    Be sure to schedule a follow-up appointment with your primary care doctor or any specialists as instructed.     Discharge Plan:   Influenza Vaccine Indication: Not indicated: Previously immunized this influenza season and > 8 years of age    I understand that a diet low in cholesterol, fat, and sodium is recommended for good health. Unless I have been given specific instructions below for another diet, I accept this instruction as my diet prescription.   Other diet: Renal    Special Instructions: None    · Is patient discharged on Warfarin / Coumadin?   No     · Is patient Post Blood Transfusion?  No    Depression / Suicide Risk    As you are discharged from this [facility] facility, it is important to learn how to keep safe from harming yourself.    Recognize the warning signs:  · Abrupt changes in personality, positive or negative- including increase in energy   · Giving away possessions  · Change in eating patterns- significant weight changes-  positive or negative  · Change in sleeping patterns- unable to sleep or sleeping all the time   · Unwillingness or inability to communicate  · Depression  · Unusual sadness, discouragement and loneliness  · Talk of wanting to die  · Neglect of personal " "appearance   · Rebelliousness- reckless behavior  · Withdrawal from people/activities they love  · Confusion- inability to concentrate     If you or a loved one observes any of these behaviors or has concerns about self-harm, here's what you can do:  · Talk about it- your feelings and reasons for harming yourself  · Remove any means that you might use to hurt yourself (examples: pills, rope, extension cords, firearm)  · Get professional help from the community (Mental Health, Substance Abuse, psychological counseling)  · Do not be alone:Call your Safe Contact- someone whom you trust who will be there for you.  · Call your local CRISIS HOTLINE 287-9047 or 802-954-5087  · Call your local Children's Mobile Crisis Response Team [place] (184) 945-7899 or www.Shopping Buddy  · Call the toll free National Suicide Prevention Hotlines   · National Suicide Prevention Lifeline 935-080-DQSZ (1351)  · [employer] Line Network 800-SUICIDE (084-0932)    Hipertensión  (Hypertension)  Cuando el corazón late bombea la tavo a través de las arterias. La fuerza que se origina es la presión arterial. Si la presión es demasiado elevada, se denomina hipertensión. El peligro radica en que puede sufrirla y no saberlo. Hipertensión puede significar que sher corazón debe trabajar más intensamente para bombear tavo. Las arterias pueden estar estrechas o rígidas. El trabajo extra aumenta el riesgo de enfermedades cardíacas, ictus y otros problemas.   La presión arterial está formada por dos números: el número mayor sobre el número patsy, por [id number]. Se señala \"110/72\". Los valores ideales son por debajo de 120 para el número más alto (sistólica) y por debajo de 80 para el más bajo (diastólica). Anote sher presión sanguínea hoy.  Debe prestar mucha atención a sher presión arterial si sufre alguna otra enfermedad daja:  · Insuficiencia cardíaca  · Ataques cardiacos previos  · Diabetes  · Enfermedad renal crónica  · Ictus " previo  · Múltiples factores de riesgo para enfermedades cardíacas  Para diagnosticar si usted sufre hipertensión arterial, debe medirse la presión mientras encuentra sentado con el brazo elevado a la altura del nivel del corazón. Debe medirse al menos 2 veces. Amisha única lectura de presión arterial elevada (especialmente en el servicio de emergencias) no significa que necesita tratamiento. Hay enfermedades en las que la presión arterial es diferente en ambos brazos. Es importante que consulte rápidamente con sher médico para un control.  La mayoría de las personas sufren hipertensión esencial, lo que significa que no tiene amisha causa específica. Sarah tipo de hipertensión puede bajarse modificando algunos factores en el estilo de randy daja:  2. Estrés.  3. El consumo de cigarrillos.  4. La falta de actividad física.  5. Peso excesivo  6. Consumo de drogas y alcohol.  7. Consumiendo menos sal  La mayoría de las personas no tienen síntomas hasta que la hipertensión ocasiona un daño en el organismo. El tratamiento efectivo puede evitar, demorar o reducir irene daño.  TRATAMIENTO:  El tratamiento para la hipertensión, cuando se ha identificado amisha causa, está dirigido a la misma Hay un gran número en medicamentos para tratarla. Se agrupan en diferentes categorías y el médico seleccionará los medicamentos indicados para usted. Muchos medicamentos disponibles tienen efectos secundarios. Debe comentar los efectos secundarios con sher médico.  Si la presión arterial permanece elevada después de modificar sher estilo de randy o comenzar a veronica medicamentos:  2. Los medicamentos deben ser reemplazados  3. Puede ser necesario evaluar otros problemas.  4. Debe estar seguro que comprende las indicaciones, que sabe cómo y cuándo veronica los medicamentos.  5. Asegúrese de realizar un control con sher médico dentro del tiempo indicado (generalmente dentro de las dos semanas) para volver a evaluar la presión arterial y revisar los medicamentos  prescritos.  6. Si está tomando más de un medicamento para la presión arterial, asegúrese que sabe cómo y en qué momentos debe tomarlos. Ella los medicamentos al mismo tiempo puede tony daja resultado un gran descenso en la presión arterial.  SOLICITE ATENCIÓN MÉDICA DE INMEDIATO SI PRESENTA:  2. Dolor de haleigh intenso, visión borrosa o cambios en la visión, o confusión.  3. Debilidad o adormecimientos inusuales o sensación de desmayo.  4. Dolor de pecho o abdominal intenso, vómitos o problemas para respirar.  ASEGÚRESE QUE:   2. Comprende estas instrucciones.  3. Controlará sher enfermedad.  4. Solicitará ayuda inmediatamente si no mejora o si empeora.  Document Released: 12/18/2006 Document Revised: 03/11/2013  MyLorry® Patient Information ©2014 [employer] (T[employer]).      Follow-up Information     1. Follow up with Quinton Saldana M.D..    Specialty:  Family Medicine    Contact information    [facility]  [address]  [facility] [address]  [phone]           Discharge Medication Instructions:    Below are the medications your physician expects you to take upon discharge:    Review all your home medications and newly ordered medications with your doctor and/or pharmacist. Follow medication instructions as directed by your doctor and/or pharmacist.    Please keep your medication list with you and share with your physician.               Medication List      CONTINUE taking these medications        Instructions    Morning Afternoon Evening Bedtime    aspirin EC 81 MG Tbec   Last time this was given:  81 mg on 8/3/2017  3:45 PM   Commonly known as:  ECOTRIN        Take 81 mg by mouth as needed (For headache).   Dose:  81 mg                        clonidine 0.3 MG Tabs   Last time this was given:  0.3 mg on 8/5/2017  8:29 AM   Commonly known as:  CATAPRESS   Next Dose Due:  Next dose tonight.        Take 0.3 mg by mouth 2 times a day.   Dose:  0.3 mg                        DIALYVITE 800 PO   Next Dose Due:  Resume home  schedule.        Take 1 Tab by mouth every evening.   Dose:  1 Tab                        hydrALAZINE 100 MG tablet   Last time this was given:  100 mg on 8/5/2017  8:29 AM   Commonly known as:  APRESOLINE   Next Dose Due:  Next dose this afternoon.        Take 1 Tab by mouth 3 times a day. Do not take if your blood pressure is <100/60   Dose:  100 mg                        metoprolol 100 MG Tabs   Last time this was given:  100 mg on 8/5/2017  8:29 AM   Commonly known as:  LOPRESSOR   Next Dose Due:  Next dose tonight.        Take 1 Tab by mouth 2 times a day.   Dose:  100 mg                        RENVELA 800 MG Tabs   Generic drug:  Sevelamer Carbonate   Next Dose Due:  Next dose with lunch.        TAKE 1 TABLET BY MOUTH 3 TIMES DAILY                                Instructions           Diet / Nutrition:    Follow any diet instructions given to you by your doctor or the dietician, including how much salt (sodium) you are allowed each day.    If you are overweight, talk to your doctor about a weight reduction plan.    Activity:    Remain physically active following your doctor's instructions about exercise and activity.    Rest often.     Any time you become even a little tired or short of breath, SIT DOWN and rest.    Worsening Symptoms:    Report any of the following signs and symptoms to the doctor's office immediately:    *Pain of jaw, arm, or neck  *Chest pain not relieved by medication                               *Dizziness or loss of consciousness  *Difficulty breathing even when at rest   *More tired than usual                                       *Bleeding drainage or swelling of surgical site  *Swelling of feet, ankles, legs or stomach                 *Fever (>100ºF)  *Pink or blood tinged sputum  *Weight gain (3lbs/day or 5lbs /week)           *Shock from internal defibrillator (if applicable)  *Palpitations or irregular heartbeats                *Cool and/or numb extremities    Stroke  Awareness    Common Risk Factors for Stroke include:    Age  Atrial Fibrillation  Carotid Artery Stenosis  Diabetes Mellitus  Excessive alcohol consumption  High blood pressure  Overweight   Physical inactivity  Smoking    Warning signs and symptoms of a stroke include:    *Sudden numbness or weakness of the face, arm or leg (especially on one side of the body).  *Sudden confusion, trouble speaking or understanding.  *Sudden trouble seeing in one or both eyes.  *Sudden trouble walking, dizziness, loss of balance or coordination.Sudden severe headache with no known cause.    It is very important to get treatment quickly when a stroke occurs. If you experience any of the above warning signs, call 911 immediately.                   Disclaimer         Quit Smoking / Tobacco Use:    I understand the use of any tobacco products increases my chance of suffering from future heart disease or stroke and could cause other illnesses which may shorten my life. Quitting the use of tobacco products is the single most important thing I can do to improve my health. For further information on smoking / tobacco cessation call a Toll Free Quit Line at 1-933.962.1137 (*National Cancer Early) or 1-456.801.9908 (American Lung Association) or you can access the web based program at www.lungusa.org.    [place] Tobacco Users Help Line:  (221) 382-5467       Toll Free: 1-911.142.1884  Quit Tobacco Program [facility] (735)481-4112    Crisis Hotline:    [place] Crisis Hotline:  4-157-ZXIDLNN or 1-469.495.9399    [place] Crisis Hotline:    1-212.353.6908 or 276-343-5295    Discharge Survey:   Thank you for choosing [facility]. We hope we did everything we could to make your hospital stay a pleasant one. You may be receiving a phone survey and we would appreciate your time and participation in answering the questions. Your input is very valuable to us in our efforts to improve our service to our patients and their  families.        My signature on this form indicates that:    1. I have reviewed and understand the above information.  2. My questions regarding this information have been answered to my satisfaction.  3. I have formulated a plan with my discharge nurse to obtain my prescribed medications for home.                  Disclaimer         __________________________________                     __________       ________                       Patient Signature                                                 Date                    Time

## 2017-08-02 NOTE — ED NOTES
"BIB [facility] to rm [facility], pt is coming from dialysis  Chief Complaint   Patient presents with   • Blood Pressure Problem     high blood pressure     Pt going to dialysis MW, on Monday she only had 2 of her 3 hours on Monday because of her elevated BP, and today only had 45 minutes.  /126 mmHg  Pulse 60  Temp(Src) 36.8 °C (98.2 °F)  Resp 16  Ht 1.676 m (5' 6\")  Wt 60.5 kg (133 lb 6.1 oz)  BMI 21.54 kg/m2    Pt c/o of a headache, denies any vision changes.   "

## 2017-08-02 NOTE — IP AVS SNAPSHOT
Letsgofordinner Access Code: [email or web address]  Expires: 8/24/2017  4:12 AM    Your email address is not on file at [facility].  Email Addresses are required for you to sign up for Letsgofordinner, please contact [phone] to verify your personal information and to provide your email address prior to attempting to register for Letsgofordinner.    [address]  [place] [address]    Letsgofordinner  A secure, online tool to manage your health information     [facility]’s Letsgofordinner® is a secure, online tool that connects you to your personalized health information from the privacy of your home -- day or night - making it very easy for you to manage your healthcare. Once the activation process is completed, you can even access your medical information using the Letsgofordinner pancho, which is available for free in the Apple Pancho store or Google Play store.     To learn more about Letsgofordinner, visit [email or web address]    There are two levels of access available (as shown below):   My Chart Features  [facility] Primary Care Doctor [facility]  Specialists [facility]  Urgent  Care Non-[facility] Primary Care Doctor   Email your healthcare team securely and privately 24/7 X X X    Manage appointments: schedule your next appointment; view details of past/upcoming appointments X      Request prescription refills. X      View recent personal medical records, including lab and immunizations X X X X   View health record, including health history, allergies, medications X X X X   Read reports about your outpatient visits, procedures, consult and ER notes X X X X   See your discharge summary, which is a recap of your hospital and/or ER visit that includes your diagnosis, lab results, and care plan X X  X     How to register for Letsgofordinner:  Once your e-mail address has been verified, follow the following steps to sign up for Letsgofordinner.     1. Go to  [email or web address]  2. Click on the Sign Up Now box, which takes you to the New Member Sign Up page.  You will need to provide the following information:  a. Enter your PeerIndex Access Code exactly as it appears at the top of this page. (You will not need to use this code after you’ve completed the sign-up process. If you do not sign up before the expiration date, you must request a new code.)   b. Enter your date of birth.   c. Enter your home email address.   d. Click Submit, and follow the next screen’s instructions.  3. Create a Saguna Networkst ID. This will be your PeerIndex login ID and cannot be changed, so think of one that is secure and easy to remember.  4. Create a PeerIndex password. You can change your password at any time.  5. Enter your Password Reset Question and Answer. This can be used at a later time if you forget your password.   6. Enter your e-mail address. This allows you to receive e-mail notifications when new information is available in PeerIndex.  7. Click Sign Up. You can now view your health information.    For assistance activating your PeerIndex account, call [phone]

## 2017-08-02 NOTE — ED PROVIDER NOTES
ED Provider Note    Scribed for Delma Peck M.D. by Delma Peck. 8/2/2017, 3:53 PM.    Primary care provider: Quinton Saldana M.D.  Means of arrival: Ambulance  History obtained from: Patient  History limited by: None    CHIEF COMPLAINT  Chief Complaint   Patient presents with   • Blood Pressure Problem     high blood pressure       HPI  Mago Farfan is a 37 y.o. female with end-stage renal disease and hypertension who presents to the Emergency Department for hypertension. Patient reports over the last few days she has had difficulty controlling her blood pressure. Two days ago when she went to dialysis her blood pressure was in the 200s systolic and she was not able to complete her dialysis. Patient again went to dialysis today and after 45 minutes remained hypertensive with systolic in the 200s, therefore she is sent to the emergency department for evaluation. Upon arrival patient reports that she is having a moderate throbbing headache that has been going on for the last 2 days. She reports that she gets headaches like this when she has high blood pressure. Patient reports that she took all of her antihypertensives this morning. She also got an extra dose of clonidine while at dialysis, prior to coming to the emergency department. Aside from her headache she has no other complaints. She denies chest pain, shortness of breath, nausea, vomiting, flank pain and abdominal pain.    REVIEW OF SYSTEMS  Review of Systems   Constitutional: Negative for fever.   HENT: Negative for sore throat.    Eyes: Negative for blurred vision and pain.   Respiratory: Negative for shortness of breath.    Cardiovascular: Negative for chest pain.   Gastrointestinal: Negative for abdominal pain.   Genitourinary: Negative for dysuria.   Musculoskeletal: Negative for neck pain.   Skin: Negative for rash.   Neurological: Positive for headaches. Negative for dizziness and tingling.     PAST MEDICAL HISTORY   has a past  "medical history of Dialysis patient (CMS-HCC); Hypertension; and Renal disorder.    SURGICAL HISTORY   has past surgical history that includes other cardiac surgery.    SOCIAL HISTORY  Social History   Substance Use Topics   • Smoking status: Never Smoker    • Smokeless tobacco: No   • Alcohol Use: No      History   Drug Use No       FAMILY HISTORY  Family History   Problem Relation Age of Onset   • Heart Disease Mother        CURRENT MEDICATIONS  Home Medications     Reviewed by Tori Cam R.N. (Registered Nurse) on 08/02/17 at 1348  Med List Status: Partial    Medication Last Dose Status    acetaminophen (TYLENOL) 325 MG Tab > 5 days Active    aspirin EC (ECOTRIN) 81 MG Tablet Delayed Response 6/12/2017 Active    B Complex-C-Folic Acid (DIALYVITE 800 PO) 8/2/2017 Active    clonidine (CATAPRESS) 0.3 MG Tab 8/2/2017 Active    hydrALAZINE (APRESOLINE) 100 MG tablet 8/2/2017 Active    metoprolol (LOPRESSOR) 100 MG Tab 8/2/2017 Active    RENVELA 800 MG Tab 8/2/2017 Active                ALLERGIES  Allergies   Allergen Reactions   • Iodine Rash     Rash   • Labetalol Palpitations     Fast heart rate, rash, HTN   • Morphine      Pt states that she can tolerate small dose of morphine (can tolerate up to 2mg dosage).       PHYSICAL EXAM  VITAL SIGNS: /110 mmHg  Pulse 67  Temp(Src) 36.8 °C (98.2 °F)  Resp 16  Ht 1.676 m (5' 6\")  Wt 60.5 kg (133 lb 6.1 oz)  BMI 21.54 kg/m2  SpO2 96%  Vitals reviewed by myself.  Physical Exam   Constitutional: She is oriented to person, place, and time and well-developed, well-nourished, and in no distress. No distress.   HENT:   Head: Normocephalic and atraumatic.   Eyes: EOM are normal. Pupils are equal, round, and reactive to light.   Neck: Normal range of motion.   Cardiovascular: Normal rate, regular rhythm and normal heart sounds.  Exam reveals no gallop and no friction rub.    No murmur heard.  Pulmonary/Chest: Effort normal and breath sounds normal. No " respiratory distress. She has no wheezes. She has no rales.   Abdominal: Soft. She exhibits no distension. There is no tenderness. There is no rebound and no guarding.   Musculoskeletal: Normal range of motion.   Strength is 5 out of 5 in all extremities.   Neurological: She is alert and oriented to person, place, and time.   Sensation intact in all extremities.   Skin: Skin is warm and dry.     DIAGNOSTIC STUDIES /  LABS  Labs Reviewed   CBC WITH DIFFERENTIAL - Abnormal; Notable for the following:     WBC 4.5 (*)     RBC 3.13 (*)     Hemoglobin 9.4 (*)     Hematocrit 28.6 (*)     MCHC 32.9 (*)     Platelet Count 131 (*)     Neutrophils-Polys 79.70 (*)     Lymphocytes 9.00 (*)     Lymphs (Absolute) 0.40 (*)     All other components within normal limits   COMP METABOLIC PANEL - Abnormal; Notable for the following:     Glucose 110 (*)     Bun 30 (*)     Creatinine 5.26 (*)     All other components within normal limits   ESTIMATED GFR - Abnormal; Notable for the following:     GFR If  11 (*)     GFR If Non  9 (*)     All other components within normal limits   TROPONIN      All labs reviewed by me.    EKG Interpretation:  Interpreted by myself    12 Lead EKG interpreted by me to show:  Time: 1407   Normal sinus rhythm  Rate 59  Axis: Normal  Intervals: When necessary 164, QRS 84, QTC mildly prolonged at 480  No acute ST-T segment changes, left ventricular hypertrophy is present when compared to prior EKG on 6/14/17 there are no dynamic changes  My impression of this EKG: Does not indicate ischemia or arrhythmia at this time.    RADIOLOGY  None    PROCEDURES  None      COURSE & MEDICAL DECISION MAKING  Nursing notes, VS, PMSFHx reviewed in chart.    Patient is a 37-year-old female with past medical history significant for end-stage renal disease and hypertension who comes in for hypertension. Differential diagnosis includes hypertensive urgency, hypertensive emergency, essential  hypertension, acute kidney injury, arrhythmia, ischemia, and end organ damage. Diagnostic workup includes labs and EKG. usually head CT is ordered as her headache is likely due to hypertension, we'll treat her hypertension and if her headache improves with treatment I will not pursue further imaging.    Patient's initial vitals notable for hypertension with systolic blood pressure of 244. Patient is allergic to labetalol, therefore we'll try IV metoprolol as she has already received an extra dose of clonidine at dialysis today. After 3 doses of IV metoprolol patient had no response in her blood pressure, therefore she is treated with IV hydralazine. Upon multiple reassessments patient reports that her headache is improving with treatment, therefore imaging of her head is not done.. Her blood pressure starts to improve to the 170s systolic after receiving 20 mg of IV hydralazine. Patient's EKG returns demonstrates no evidence of arrhythmia or ischemia. Labs are unremarkable. Troponin is within normal limits. As patient was symptomatic with her hypertension, and her hypertension is not being well controlled with her home medication she'll be admitted for further management and optimization of her hypertensive medications. At time of admission patient is in stable condition.    4:57 PM Spoke with Dr. Gongora regarding the patient. They have accepted the patient for admission.       FINAL IMPRESSION  1. Hypertensive urgency    2. Acute nonintractable headache, unspecified headache type

## 2017-08-02 NOTE — ED NOTES
Break rn note: pt sitting up bud family at bedside, bp still elevated. Medicated per erp's order  Updated pt and family poc

## 2017-08-02 NOTE — ED NOTES
Med rec complete per pt and family at bedside to translate  Allergies reviewed  No ABX in last month

## 2017-08-02 NOTE — IP AVS SNAPSHOT
8/5/2017    Mago Farfan  [address]  [facility] [address]    Dear Mago:    [facility] wants to ensure your discharge home is safe and you or your loved ones have had all of your questions answered regarding your care after you leave the hospital.    Below is a list of resources and contact information should you have any questions regarding your hospital stay, follow-up instructions, or active medical symptoms.    Questions or Concerns Regarding… Contact   Medical Questions Related to Your Discharge  (7 days a week, 8am-5pm) Contact a Nurse Care Coordinator   [phone]   Medical Questions Not Related to Your Discharge  (24 hours a day / 7 days a week)  Contact the Nurse Health Line   [phone]    Medications or Discharge Instructions Refer to your discharge packet   or contact your [facility] Primary Care Provider   [phone]   Follow-up Appointment(s) Schedule your appointment via [employer]   or contact Scheduling [phone]   Billing Review your statement via [employer]  or contact Billing [phone]   Medical Records Review your records via [employer]   or contact Medical Records [phone]     You may receive a telephone call within two days of discharge. This call is to make certain you understand your discharge instructions and have the opportunity to have any questions answered. You can also easily access your medical information, test results and upcoming appointments via the [employer] free online health management tool. You can learn more and sign up at [employer]. For assistance setting up your [employer] account, please call [phone].    Once again, we want to ensure your discharge home is safe and that you have a clear understanding of any next steps in your care. If you have any questions or concerns, please do not hesitate to contact us, we are here for you. Thank you for choosing [facility] for your healthcare needs.    Sincerely,    Your [facility] Healthcare Team

## 2017-08-03 PROBLEM — I27.20 PULMONARY HYPERTENSION (HCC): Status: ACTIVE | Noted: 2017-08-03

## 2017-08-03 PROBLEM — I67.4 HYPERTENSIVE ENCEPHALOPATHY: Status: ACTIVE | Noted: 2017-08-03

## 2017-08-03 LAB
ANION GAP SERPL CALC-SCNC: 12 MMOL/L (ref 0–11.9)
BASOPHILS # BLD AUTO: 0.5 % (ref 0–1.8)
BASOPHILS # BLD: 0.04 K/UL (ref 0–0.12)
BUN SERPL-MCNC: 42 MG/DL (ref 8–22)
CALCIUM SERPL-MCNC: 9.6 MG/DL (ref 8.5–10.5)
CHLORIDE SERPL-SCNC: 97 MMOL/L (ref 96–112)
CO2 SERPL-SCNC: 29 MMOL/L (ref 20–33)
CREAT SERPL-MCNC: 7.84 MG/DL (ref 0.5–1.4)
EOSINOPHIL # BLD AUTO: 0 K/UL (ref 0–0.51)
EOSINOPHIL NFR BLD: 0 % (ref 0–6.9)
ERYTHROCYTE [DISTWIDTH] IN BLOOD BY AUTOMATED COUNT: 44.6 FL (ref 35.9–50)
GFR SERPL CREATININE-BSD FRML MDRD: 6 ML/MIN/1.73 M 2
GLUCOSE SERPL-MCNC: 121 MG/DL (ref 65–99)
HCT VFR BLD AUTO: 31.8 % (ref 37–47)
HGB BLD-MCNC: 10.3 G/DL (ref 12–16)
IMM GRANULOCYTES # BLD AUTO: 0.03 K/UL (ref 0–0.11)
IMM GRANULOCYTES NFR BLD AUTO: 0.4 % (ref 0–0.9)
LYMPHOCYTES # BLD AUTO: 0.59 K/UL (ref 1–4.8)
LYMPHOCYTES NFR BLD: 7.1 % (ref 22–41)
MCH RBC QN AUTO: 29.7 PG (ref 27–33)
MCHC RBC AUTO-ENTMCNC: 32.4 G/DL (ref 33.6–35)
MCV RBC AUTO: 91.6 FL (ref 81.4–97.8)
MONOCYTES # BLD AUTO: 0.37 K/UL (ref 0–0.85)
MONOCYTES NFR BLD AUTO: 4.5 % (ref 0–13.4)
NEUTROPHILS # BLD AUTO: 7.23 K/UL (ref 2–7.15)
NEUTROPHILS NFR BLD: 87.5 % (ref 44–72)
NRBC # BLD AUTO: 0 K/UL
NRBC BLD AUTO-RTO: 0 /100 WBC
PLATELET # BLD AUTO: 143 K/UL (ref 164–446)
PMV BLD AUTO: 10.1 FL (ref 9–12.9)
POTASSIUM SERPL-SCNC: 5.2 MMOL/L (ref 3.6–5.5)
RBC # BLD AUTO: 3.47 M/UL (ref 4.2–5.4)
SODIUM SERPL-SCNC: 138 MMOL/L (ref 135–145)
TROPONIN I SERPL-MCNC: 0.08 NG/ML (ref 0–0.04)
WBC # BLD AUTO: 8.3 K/UL (ref 4.8–10.8)

## 2017-08-03 PROCEDURE — 770022 HCHG ROOM/CARE - ICU (200)

## 2017-08-03 PROCEDURE — 85025 COMPLETE CBC W/AUTO DIFF WBC: CPT

## 2017-08-03 PROCEDURE — 700111 HCHG RX REV CODE 636 W/ 250 OVERRIDE (IP): Performed by: EMERGENCY MEDICINE

## 2017-08-03 PROCEDURE — 700101 HCHG RX REV CODE 250: Performed by: INTERNAL MEDICINE

## 2017-08-03 PROCEDURE — A9270 NON-COVERED ITEM OR SERVICE: HCPCS | Performed by: INTERNAL MEDICINE

## 2017-08-03 PROCEDURE — 700102 HCHG RX REV CODE 250 W/ 637 OVERRIDE(OP): Performed by: INTERNAL MEDICINE

## 2017-08-03 PROCEDURE — A9270 NON-COVERED ITEM OR SERVICE: HCPCS | Performed by: HOSPITALIST

## 2017-08-03 PROCEDURE — 700111 HCHG RX REV CODE 636 W/ 250 OVERRIDE (IP): Performed by: INTERNAL MEDICINE

## 2017-08-03 PROCEDURE — 700105 HCHG RX REV CODE 258: Performed by: INTERNAL MEDICINE

## 2017-08-03 PROCEDURE — 80048 BASIC METABOLIC PNL TOTAL CA: CPT

## 2017-08-03 PROCEDURE — 5A1D60Z PERFORMANCE OF URINARY FILTRATION, MULTIPLE: ICD-10-PCS | Performed by: INTERNAL MEDICINE

## 2017-08-03 PROCEDURE — 90935 HEMODIALYSIS ONE EVALUATION: CPT

## 2017-08-03 PROCEDURE — 99291 CRITICAL CARE FIRST HOUR: CPT | Performed by: HOSPITALIST

## 2017-08-03 PROCEDURE — 84484 ASSAY OF TROPONIN QUANT: CPT

## 2017-08-03 PROCEDURE — 700102 HCHG RX REV CODE 250 W/ 637 OVERRIDE(OP): Performed by: HOSPITALIST

## 2017-08-03 RX ORDER — OXYCODONE HYDROCHLORIDE 5 MG/1
5-10 TABLET ORAL
Status: DISCONTINUED | OUTPATIENT
Start: 2017-08-03 | End: 2017-08-03

## 2017-08-03 RX ORDER — HEPARIN SODIUM 1000 [USP'U]/ML
2000 INJECTION, SOLUTION INTRAVENOUS; SUBCUTANEOUS
Status: DISCONTINUED | OUTPATIENT
Start: 2017-08-03 | End: 2017-08-05 | Stop reason: HOSPADM

## 2017-08-03 RX ORDER — ALBUMIN (HUMAN) 12.5 G/50ML
12.5 SOLUTION INTRAVENOUS
Status: DISCONTINUED | OUTPATIENT
Start: 2017-08-03 | End: 2017-08-05 | Stop reason: HOSPADM

## 2017-08-03 RX ORDER — OXYCODONE HYDROCHLORIDE 10 MG/1
10 TABLET ORAL
Status: DISCONTINUED | OUTPATIENT
Start: 2017-08-03 | End: 2017-08-05 | Stop reason: HOSPADM

## 2017-08-03 RX ORDER — OXYCODONE HYDROCHLORIDE 5 MG/1
5 TABLET ORAL
Status: DISCONTINUED | OUTPATIENT
Start: 2017-08-03 | End: 2017-08-05 | Stop reason: HOSPADM

## 2017-08-03 RX ORDER — LIDOCAINE HYDROCHLORIDE 10 MG/ML
1 INJECTION, SOLUTION INFILTRATION; PERINEURAL PRN
Status: DISCONTINUED | OUTPATIENT
Start: 2017-08-03 | End: 2017-08-05 | Stop reason: HOSPADM

## 2017-08-03 RX ORDER — OXYCODONE HYDROCHLORIDE 5 MG/1
2.5 TABLET ORAL ONCE
Status: COMPLETED | OUTPATIENT
Start: 2017-08-03 | End: 2017-08-03

## 2017-08-03 RX ADMIN — ONDANSETRON 4 MG: 2 INJECTION INTRAMUSCULAR; INTRAVENOUS at 03:20

## 2017-08-03 RX ADMIN — ACETAMINOPHEN 650 MG: 325 TABLET, FILM COATED ORAL at 00:15

## 2017-08-03 RX ADMIN — SODIUM CHLORIDE 4 MG/HR: 9 INJECTION, SOLUTION INTRAVENOUS at 08:21

## 2017-08-03 RX ADMIN — METOPROLOL TARTRATE 100 MG: 50 TABLET, FILM COATED ORAL at 20:01

## 2017-08-03 RX ADMIN — METOPROLOL TARTRATE 100 MG: 50 TABLET, FILM COATED ORAL at 09:15

## 2017-08-03 RX ADMIN — HYDRALAZINE HYDROCHLORIDE 20 MG: 20 INJECTION INTRAMUSCULAR; INTRAVENOUS at 02:30

## 2017-08-03 RX ADMIN — STANDARDIZED SENNA CONCENTRATE AND DOCUSATE SODIUM 2 TABLET: 8.6; 5 TABLET, FILM COATED ORAL at 09:15

## 2017-08-03 RX ADMIN — ACETAMINOPHEN 650 MG: 325 TABLET, FILM COATED ORAL at 15:45

## 2017-08-03 RX ADMIN — CLONIDINE HYDROCHLORIDE 0.3 MG: 0.1 TABLET ORAL at 20:00

## 2017-08-03 RX ADMIN — CLONIDINE HYDROCHLORIDE 0.3 MG: 0.1 TABLET ORAL at 09:15

## 2017-08-03 RX ADMIN — HYDRALAZINE HYDROCHLORIDE 100 MG: 50 TABLET, FILM COATED ORAL at 20:01

## 2017-08-03 RX ADMIN — OXYCODONE HYDROCHLORIDE 10 MG: 10 TABLET ORAL at 20:51

## 2017-08-03 RX ADMIN — HYDRALAZINE HYDROCHLORIDE 100 MG: 50 TABLET, FILM COATED ORAL at 11:24

## 2017-08-03 RX ADMIN — ONDANSETRON 4 MG: 2 INJECTION INTRAMUSCULAR; INTRAVENOUS at 12:03

## 2017-08-03 RX ADMIN — PROMETHAZINE HYDROCHLORIDE 25 MG: 12.5 SUPPOSITORY RECTAL at 00:37

## 2017-08-03 RX ADMIN — HEPARIN SODIUM 2000 UNITS: 1000 INJECTION, SOLUTION INTRAVENOUS; SUBCUTANEOUS at 14:17

## 2017-08-03 RX ADMIN — SODIUM CHLORIDE 2.5 MG/HR: 9 INJECTION, SOLUTION INTRAVENOUS at 03:20

## 2017-08-03 RX ADMIN — METOPROLOL TARTRATE 100 MG: 50 TABLET, FILM COATED ORAL at 00:18

## 2017-08-03 RX ADMIN — ONDANSETRON 4 MG: 4 TABLET, ORALLY DISINTEGRATING ORAL at 00:13

## 2017-08-03 RX ADMIN — HYDRALAZINE HYDROCHLORIDE 100 MG: 50 TABLET, FILM COATED ORAL at 00:15

## 2017-08-03 RX ADMIN — ONDANSETRON 4 MG: 2 INJECTION INTRAMUSCULAR; INTRAVENOUS at 08:04

## 2017-08-03 RX ADMIN — ASPIRIN 81 MG: 81 TABLET ORAL at 15:45

## 2017-08-03 RX ADMIN — OXYCODONE HYDROCHLORIDE 2.5 MG: 5 TABLET ORAL at 05:05

## 2017-08-03 RX ADMIN — HYDRALAZINE HYDROCHLORIDE 20 MG: 20 INJECTION INTRAMUSCULAR; INTRAVENOUS at 21:30

## 2017-08-03 RX ADMIN — HEPARIN SODIUM 5000 UNITS: 5000 INJECTION, SOLUTION INTRAVENOUS; SUBCUTANEOUS at 20:01

## 2017-08-03 RX ADMIN — HYDRALAZINE HYDROCHLORIDE 100 MG: 50 TABLET, FILM COATED ORAL at 15:16

## 2017-08-03 RX ADMIN — ONDANSETRON 4 MG: 2 INJECTION INTRAMUSCULAR; INTRAVENOUS at 16:20

## 2017-08-03 RX ADMIN — RENAGEL 800 MG: 800 TABLET ORAL at 11:25

## 2017-08-03 RX ADMIN — HEPARIN SODIUM 5000 UNITS: 5000 INJECTION, SOLUTION INTRAVENOUS; SUBCUTANEOUS at 15:16

## 2017-08-03 RX ADMIN — HEPARIN SODIUM 5000 UNITS: 5000 INJECTION, SOLUTION INTRAVENOUS; SUBCUTANEOUS at 00:15

## 2017-08-03 RX ADMIN — HYDRALAZINE HYDROCHLORIDE 20 MG: 20 INJECTION INTRAMUSCULAR; INTRAVENOUS at 15:40

## 2017-08-03 RX ADMIN — HEPARIN SODIUM 5000 UNITS: 5000 INJECTION, SOLUTION INTRAVENOUS; SUBCUTANEOUS at 06:24

## 2017-08-03 RX ADMIN — STANDARDIZED SENNA CONCENTRATE AND DOCUSATE SODIUM 2 TABLET: 8.6; 5 TABLET, FILM COATED ORAL at 20:01

## 2017-08-03 RX ADMIN — ASPIRIN 81 MG: 81 TABLET ORAL at 00:15

## 2017-08-03 ASSESSMENT — LIFESTYLE VARIABLES
EVER_SMOKED: NEVER
DO YOU DRINK ALCOHOL: NO

## 2017-08-03 ASSESSMENT — PAIN SCALES - GENERAL
PAINLEVEL_OUTOF10: 1
PAINLEVEL_OUTOF10: 3
PAINLEVEL_OUTOF10: 5
PAINLEVEL_OUTOF10: 6
PAINLEVEL_OUTOF10: 7
PAINLEVEL_OUTOF10: 2
PAINLEVEL_OUTOF10: 2
PAINLEVEL_OUTOF10: 5
PAINLEVEL_OUTOF10: 1
PAINLEVEL_OUTOF10: 7
PAINLEVEL_OUTOF10: 3
PAINLEVEL_OUTOF10: 1
PAINLEVEL_OUTOF10: 1
PAINLEVEL_OUTOF10: 6
PAINLEVEL_OUTOF10: 5
PAINLEVEL_OUTOF10: 3

## 2017-08-03 ASSESSMENT — PATIENT HEALTH QUESTIONNAIRE - PHQ9
SUM OF ALL RESPONSES TO PHQ QUESTIONS 1-9: 0
1. LITTLE INTEREST OR PLEASURE IN DOING THINGS: NOT AT ALL
SUM OF ALL RESPONSES TO PHQ QUESTIONS 1-9: 0
SUM OF ALL RESPONSES TO PHQ9 QUESTIONS 1 AND 2: 0
1. LITTLE INTEREST OR PLEASURE IN DOING THINGS: NOT AT ALL
2. FEELING DOWN, DEPRESSED, IRRITABLE, OR HOPELESS: NOT AT ALL
SUM OF ALL RESPONSES TO PHQ9 QUESTIONS 1 AND 2: 0
2. FEELING DOWN, DEPRESSED, IRRITABLE, OR HOPELESS: NOT AT ALL

## 2017-08-03 ASSESSMENT — ENCOUNTER SYMPTOMS
SHORTNESS OF BREATH: 0
HEADACHES: 1
CHILLS: 0
PALPITATIONS: 0
FEVER: 0
ROS GI COMMENTS: TOLERATING A DIET

## 2017-08-03 NOTE — CONSULTS
Chief Complaint   Patient presents with   • Blood Pressure Problem     high blood pressure       Requesting provider: Dr. Ritchie    HPI: 37 year old with ESRD, normally dialyzes on MWF schedule.  Last seen in hospital in June, this time, she came in with severe hypertension with SBP >230. Symptoms included nasuea and vomiting as well as severe headache. CT of the head showed no acute issues.  Was placed on nipride, and had improvement of her HA and N/V. She normally dialyzes MWF.        Past Medical History   Diagnosis Date   • Dialysis patient (CMS-[facility])    • Hypertension    • Renal disorder        Social History   Substance Use Topics   • Smoking status: Never Smoker    • Smokeless tobacco: None   • Alcohol Use: No       Family History   Problem Relation Age of Onset   • Heart Disease Mother        Allergies   Allergen Reactions   • Iodine Rash     Rash   • Labetalol Palpitations     Fast heart rate, rash, HTN   • Morphine      Pt states that she can tolerate small dose of morphine (can tolerate up to 2mg dosage).       Review of Systems   Cardiovascular: Negative for chest pain and palpitations.   All other systems reviewed and are negative.      Physical Exam   Constitutional: She is oriented to person, place, and time and well-developed, well-nourished, and in no distress.   HENT:   Head: Normocephalic and atraumatic.   Cardiovascular: Normal rate and regular rhythm.    Pulmonary/Chest: Effort normal and breath sounds normal.   Musculoskeletal: She exhibits no edema or tenderness.   Neurological: She is alert and oriented to person, place, and time.   Skin: Skin is warm and dry.       LABS:  Recent Labs      08/02/17   1354  08/03/17   0441   WBC  4.5*  8.3   RBC  3.13*  3.47*   HEMOGLOBIN  9.4*  10.3*   HEMATOCRIT  28.6*  31.8*   MCV  91.4  91.6   MCH  30.0  29.7   RDW  43.0  44.6   PLATELETCT  131*  143*   MPV  10.5  10.1   NEUTSPOLYS  79.70*  87.50*   LYMPHOCYTES  9.00*  7.10*   MONOCYTES  7.20  4.50    EOSINOPHILS  3.40  0.00   BASOPHILS  0.70  0.50     Recent Labs      08/02/17   1354  08/03/17   0441   SODIUM  138  138   POTASSIUM  3.8  5.2   CHLORIDE  97  97   CO2  33  29   GLUCOSE  110*  121*   BUN  30*  42*       STUDIES:  8/2/2017 9:32 PM    HISTORY/REASON FOR EXAM:  Headache.      TECHNIQUE/EXAM DESCRIPTION AND NUMBER OF VIEWS:  CT of the head without contrast.    The study was performed on a [employer] CT scanner. Contiguous 5 mm axial sections were obtained from the skull base through the vertex.    Up to date radiation dose reduction adjustments have been utilized to meet ALARA standards for radiation dose reduction.    COMPARISON:  Study from earlier the same date    FINDINGS:  The calvaria are normal in appearance.    Mastoid air cells and visualized paranasal sinuses are clear.    The visualized portions of the orbits are normal in appearance.    The brain parenchyma is normal in appearance.    There is no hemorrhage.  There is no evidence for acute infarct.    The ventricular system is normal in size and position.    There are no extra-axial fluid collection present.         Impression        Head CT without contrast within normal limits. No evidence of acute cerebral infarction, hemorrhage or mass lesion.       Assessment:  1. ESRD - Normally MWF, due for dialysis today  2. HTN - On nipride, no clear cause of elevated BP, monitor  3. Anemia, on epogen, hold for now      Plan:  1. HD today

## 2017-08-03 NOTE — CARE PLAN
Problem: Pain Management  Goal: Pain level will decrease to patient’s comfort goal  Outcome: PROGRESSING AS EXPECTED  Using nurse pain scale, medicated per MAR, non pharm measures in place as well.     Problem: Knowledge Deficit  Goal: Knowledge of disease process/condition, treatment plan, diagnostic tests, and medications will improve  Outcome: PROGRESSING AS EXPECTED  Educated on iv bp meds being used, importance of daily regiment of bp meds, dialysis, adherance to low na diet.

## 2017-08-03 NOTE — ASSESSMENT & PLAN NOTE
Nicardipine drip with PRN IV hydralazine and labetalol given and now off.  Resumed home rx of metoprolol, clonidine, hydralazine and BP is now appropriate.  We had a long discussion and Ms. Billings states that she has been compliant with her meds at home.   Nephrectomies may be a consideration if BP control cannot be achieved in the future.

## 2017-08-03 NOTE — PROGRESS NOTES
[facility] Hospitalist Progress Note    Date of Service: 8/3/2017    Chief Complaint  37 y.o. female admitted 2017 with headache.    Interval Problem Update  Ms. Billings has a hx of ESRD and htn that was sent from the dialysis center with a headache and n/v and a SBP of 244 consistent with hypertensive emergency. She has been admitted to the ICU for an IV nicardipine drip.   She vomited last night.  Her headache has lessened today.  Consultants/Specialty  nephrology  I discussed her condition with Dr. Flower this morning.  Disposition  ICU        Review of Systems   Constitutional: Negative for fever and chills.   Respiratory: Negative for shortness of breath.    Cardiovascular: Negative for chest pain and leg swelling.   Gastrointestinal:        Tolerating a diet   Neurological: Positive for headaches.        Sleepy      Physical Exam  Laboratory/Imaging   Hemodynamics  Temp (24hrs), Av.2 °C (98.9 °F), Min:36.8 °C (98.2 °F), Max:37.3 °C (99.2 °F)   Temperature: 37.2 °C (99 °F)  Pulse  Av.5  Min: 54  Max: 113 Heart Rate (Monitored): 85  Blood Pressure: (!) 224/110 mmHg, NIBP: 137/80 mmHg      Respiratory      Respiration: 20, Pulse Oximetry: 100 %, O2 Daily Delivery Respiratory : Room Air with O2 Available     Work Of Breathing / Effort: Mild  RUL Breath Sounds: Clear, RML Breath Sounds: Clear, RLL Breath Sounds: Clear, YOUNG Breath Sounds: Clear, LLL Breath Sounds: Clear    Fluids    Intake/Output Summary (Last 24 hours) at 17 0707  Last data filed at 17 0600   Gross per 24 hour   Intake 431.25 ml   Output      0 ml   Net 431.25 ml       Nutrition  Orders Placed This Encounter   Procedures   • Diet Order     Standing Status: Standing      Number of Occurrences: 1      Standing Expiration Date:      Order Specific Question:  Diet:     Answer:  2 Gram Sodium [7]     Order Specific Question:  Diet:     Answer:  Renal [8]     Physical Exam   Constitutional: She is oriented to person, place, and  time.   thin   Neck: Neck supple.   Cardiovascular: Normal rate and regular rhythm.    Murmur heard.  Abdominal: Soft. She exhibits no distension. There is no tenderness.   Musculoskeletal: She exhibits no edema or tenderness.   Left arm fistula   Neurological: She is alert and oriented to person, place, and time.   Psychiatric: She has a normal mood and affect.       Recent Labs      08/02/17   1354  08/03/17   0441   WBC  4.5*  8.3   RBC  3.13*  3.47*   HEMOGLOBIN  9.4*  10.3*   HEMATOCRIT  28.6*  31.8*   MCV  91.4  91.6   MCH  30.0  29.7   MCHC  32.9*  32.4*   RDW  43.0  44.6   PLATELETCT  131*  143*   MPV  10.5  10.1     Recent Labs      08/02/17   1354  08/03/17   0441   SODIUM  138  138   POTASSIUM  3.8  5.2   CHLORIDE  97  97   CO2  33  29   GLUCOSE  110*  121*   BUN  30*  42*   CREATININE  5.26*  7.84*   CALCIUM  9.4  9.6                      Assessment/Plan     * Hypertensive emergency  Assessment & Plan  Nicardipine drip with PRN IV hydralazine and labetalol   Resume home rx of metoprolol, clonidine, hydralazine   Cont cardiac monitor      Hypertensive urgency  Assessment & Plan  Hydralazine, labetalol prn  Resume home rx  Cont cardiac monitor  F/u CT head  HD per nephro    ESRD (end stage renal disease) on dialysis (CMS-HCC)  Assessment & Plan  Dr. Flower, nephrology consulted for dialysis.  She did not complete treatment outpatient.    Hypertensive encephalopathy (present on admission)  Assessment & Plan  With confusion on admission which resolved.    History of lupus nephritis (present on admission)  Assessment & Plan  Resulting in renal failure.    Pulmonary hypertension (CMS-HCC) (present on admission)  Assessment & Plan  Echo 6/17:  CONCLUSIONS  Comapred to the prior echo dated 8/19/2016, there has been a   normalization of the left ventricular systolic function.  Estimated right ventricular systolic pressure  is 50 mmHg.  Left ventricular ejection fraction is visually estimated to be 60%.  Grade I  diastolic dysfunction.    Anemia of chronic disease (present on admission)  Assessment & Plan  Monitor, Hb 10, consistent with chronic renal disease.     Nausea and vomiting (present on admission)  Assessment & Plan  Antiemetics prn  zofran rtc    Headache  Assessment & Plan  Pain control, CT head is negative for bleed.      Labs reviewed and Medications reviewed  Williamson catheter: No Williamson      DVT Prophylaxis: Heparin                Pt is critically ill in the ICU, 32 minutes of critical care time spent with patient and nursing and in specific management of hypertensive crisis requiring a nicardipine drip. See orders.

## 2017-08-03 NOTE — PROGRESS NOTES
Belongings:      Striped purse with white phone , red blanket, shoes, pants, shirt, coat, undergarments.    Discussed with patient, no valuables--denies safekeeping-- including renvala, metoprolol, & clonidine--purse to be sent home with  per patient.

## 2017-08-03 NOTE — ASSESSMENT & PLAN NOTE
Echo 6/17:  CONCLUSIONS  Comapred to the prior echo dated 8/19/2016, there has been a   normalization of the left ventricular systolic function.  Estimated right ventricular systolic pressure  is 50 mmHg.  Left ventricular ejection fraction is visually estimated to be 60%.  Grade I diastolic dysfunction.

## 2017-08-03 NOTE — ED NOTES
Report has been given to [facility] RN, lab is at bedside to draw Troponin and TSH.

## 2017-08-03 NOTE — ED NOTES
RN attempted to give pt po BP medication but held due to vomiting. This RN spoke with pharmacy regarding BP and medications, RN gave zofran and will talk to ERP

## 2017-08-03 NOTE — CARE PLAN
"Problem: Pain Management  Goal: Pain level will decrease to patient’s comfort goal  Intervention: Educate and implement non-pharmacologic comfort measures. Examples: relaxation, distration, play therapy, activity therapy, massage, etc.  Patient verbalized decrease in headache, down to 1 on a scale of 1/10.       Problem: Knowledge Deficit  Goal: Knowledge of disease process/condition, treatment plan, diagnostic tests, and medications will improve  Outcome: PROGRESSING AS EXPECTED  Discussed importance of blood pressure medications to patient, even when she is not \"feeling well\".         "

## 2017-08-03 NOTE — H&P
Hospital Medicine History and Physical    Date of Service  8/2/2017    Chief Complaint  Chief Complaint   Patient presents with   • Blood Pressure Problem     high blood pressure       History of Presenting Illness  37 y.o. female who presented 8/2/2017 with h/o ESRD on HD M, W, F sent from HD with hypertensive urgency with h/a, sys >230 with h/a x 3-4 days.  Pt has been compliant on home rx.  Denies recent illness, neg f/c, abdominal pain.  + nausea, neg vomiting.    On exam, family at bedside, pt is poor historian, denies focal weakness.  Pt did not completed HD today d/t H/a.    Repeat exam, ongoing nausea, with uncontrolled HTN, will admit to ICU for labetalol infusion  Ct head neg          Primary Care Physician  Quinton Saldana M.D.    Consultants  Nephrology [facility], Please consult in am    Code Status  Full    Review of Systems  Review of Systems   Constitutional: Negative for fever, chills, malaise/fatigue and diaphoresis.   HENT: Negative for congestion and hearing loss.    Eyes: Negative for blurred vision and double vision.   Respiratory: Negative for cough and shortness of breath.    Cardiovascular: Negative for chest pain, palpitations and leg swelling.   Gastrointestinal: Positive for nausea. Negative for vomiting and abdominal pain.   Genitourinary: Negative for flank pain.   Musculoskeletal: Positive for myalgias. Negative for back pain and joint pain.   Neurological: Positive for weakness and headaches. Negative for dizziness, tremors, sensory change, speech change, focal weakness and loss of consciousness.   Psychiatric/Behavioral: Positive for depression. Negative for memory loss. The patient is not nervous/anxious.           Past Medical History  Past Medical History   Diagnosis Date   • Dialysis patient (CMS-[facility])    • Hypertension    • Renal disorder        Surgical History  Past Surgical History   Procedure Laterality Date   • Other cardiac surgery         Medications  No current  facility-administered medications on file prior to encounter.     Current Outpatient Prescriptions on File Prior to Encounter   Medication Sig Dispense Refill   • RENVELA 800 MG Tab TAKE 1 TABLET BY MOUTH 3 TIMES DAILY  150 Tab 11   • metoprolol (LOPRESSOR) 100 MG Tab Take 1 Tab by mouth 2 times a day. 60 Tab 3   • hydrALAZINE (APRESOLINE) 100 MG tablet Take 1 Tab by mouth 3 times a day. Do not take if your blood pressure is <100/60 90 Tab 2   • aspirin EC (ECOTRIN) 81 MG Tablet Delayed Response Take 81 mg by mouth as needed (For headache).     • B Complex-C-Folic Acid (DIALYVITE 800 PO) Take 1 Tab by mouth every evening.         Family History  Family History   Problem Relation Age of Onset   • Heart Disease Mother        Social History  Social History   Substance Use Topics   • Smoking status: Never Smoker    • Smokeless tobacco: None   • Alcohol Use: No       Allergies  Allergies   Allergen Reactions   • Iodine Rash     Rash   • Labetalol Palpitations     Fast heart rate, rash, HTN   • Morphine      Pt states that she can tolerate small dose of morphine (can tolerate up to 2mg dosage).        Physical Exam  Laboratory   Hemodynamics  Temp (24hrs), Av.8 °C (98.2 °F), Min:36.8 °C (98.2 °F), Max:36.8 °C (98.2 °F)   Temperature: 36.8 °C (98.2 °F)  Pulse  Av.1  Min: 54  Max: 100    Blood Pressure: (!) 224/110 mmHg, NIBP: (!) 239/143 mmHg      Respiratory      Respiration: 16, Pulse Oximetry: 99 %, O2 Daily Delivery Respiratory : Room Air with O2 Available     Work Of Breathing / Effort: Mild  RUL Breath Sounds: Clear, RML Breath Sounds: Clear, RLL Breath Sounds: Clear, YOUNG Breath Sounds: Clear, LLL Breath Sounds: Clear    Physical Exam   Constitutional: She is oriented to person, place, and time. She appears well-nourished. She appears distressed.   Thin     HENT:   Head: Normocephalic and atraumatic.   Nose: Nose normal.   Eyes: Conjunctivae and EOM are normal. Pupils are equal, round, and reactive to light.  Right eye exhibits no discharge. Left eye exhibits no discharge. No scleral icterus.   Neck: Normal range of motion. Neck supple. No JVD present. No thyromegaly present.   Cardiovascular: Normal rate and intact distal pulses.    No murmur heard.  Pulmonary/Chest: Effort normal and breath sounds normal. No respiratory distress. She has no wheezes.   Abdominal: Soft. Bowel sounds are normal. She exhibits no distension. There is no tenderness.   Musculoskeletal: She exhibits tenderness. She exhibits no edema.   + avf LUE, + bruit   Neurological: She is alert and oriented to person, place, and time. No cranial nerve deficit. She exhibits normal muscle tone. Coordination normal.   Skin: Skin is warm and dry. No rash noted. She is not diaphoretic. No erythema.   Psychiatric: Thought content normal.   Nursing note and vitals reviewed.      Recent Labs      08/02/17   1354   WBC  4.5*   RBC  3.13*   HEMOGLOBIN  9.4*   HEMATOCRIT  28.6*   MCV  91.4   MCH  30.0   MCHC  32.9*   RDW  43.0   PLATELETCT  131*   MPV  10.5     Recent Labs      08/02/17   1354   SODIUM  138   POTASSIUM  3.8   CHLORIDE  97   CO2  33   GLUCOSE  110*   BUN  30*   CREATININE  5.26*   CALCIUM  9.4     Recent Labs      08/02/17   1354   ALTSGPT  19   ASTSGOT  13   ALKPHOSPHAT  71   TBILIRUBIN  0.6   GLUCOSE  110*                 Lab Results   Component Value Date    TROPONINI <0.01 08/02/2017       Imaging  CT head pending   Assessment/Plan     I anticipate this patient is appropriate for observation status at this time.    * Hypertensive emergency  Assessment & Plan  Hydralazine, labetalol prn  Resume home rx  Cont cardiac monitor   CT head neg  HD per nephro    Admit to ICU, labetalol infusion, slow taper    ESRD (end stage renal disease) on dialysis (CMS-[facility])  Assessment & Plan  M,w, f  Nephro, nylk, please consult in am  Did not complete tx W    Headache  Assessment & Plan  Pain control  As above  No focal deficits    Hypertensive urgency  Assessment  & Plan  Hydralazine, labetalol prn  Resume home rx  Cont cardiac monitor  F/u CT head  HD per nephro    Nausea and vomiting (present on admission)  Assessment & Plan  Antiemetics prn  zofran rtc    Anemia of chronic disease (present on admission)  Assessment & Plan  monitor      spent a total of 40 minutes of critical care time during this clinical encounter of which > 50% was devoted to counseling and coordinating care including review of records, pertinent lab data and studies, as well as discussing diagnostic evaluation and work up, planned therapeutic interventions and future disposition of care. Where indicated, the assessment and plan reflect discussion of patient with consultants, other healthcare providers, family members, and additional research needed to obtain further information in formulating the plan of care of this patient. This time includes no procedures or overlap with other providers.   VTE prophylaxis: SCD.

## 2017-08-03 NOTE — ED NOTES
Telemetry charge RN called regarding pt's BP, poc discussed, this RN will call her with an updated poc.

## 2017-08-03 NOTE — PROGRESS NOTES
0300 call placed to phlebotomy to have AM labs drawn    0345 call placed to phlebotomy to have Am labs drawn

## 2017-08-03 NOTE — ED NOTES
Pt has a room in the [facility], report has been called, receiving RN is at CT, this RN will call again. Cardene drip infusing, BP will be checked q 15.

## 2017-08-04 LAB
ALBUMIN SERPL BCP-MCNC: 4 G/DL (ref 3.2–4.9)
BUN SERPL-MCNC: 23 MG/DL (ref 8–22)
CALCIUM SERPL-MCNC: 9.7 MG/DL (ref 8.5–10.5)
CHLORIDE SERPL-SCNC: 96 MMOL/L (ref 96–112)
CO2 SERPL-SCNC: 30 MMOL/L (ref 20–33)
CREAT SERPL-MCNC: 6.49 MG/DL (ref 0.5–1.4)
GFR SERPL CREATININE-BSD FRML MDRD: 7 ML/MIN/1.73 M 2
GLUCOSE SERPL-MCNC: 83 MG/DL (ref 65–99)
PHOSPHATE SERPL-MCNC: 4.3 MG/DL (ref 2.5–4.5)
POTASSIUM SERPL-SCNC: 4.8 MMOL/L (ref 3.6–5.5)
SODIUM SERPL-SCNC: 135 MMOL/L (ref 135–145)

## 2017-08-04 PROCEDURE — 700111 HCHG RX REV CODE 636 W/ 250 OVERRIDE (IP)

## 2017-08-04 PROCEDURE — 99232 SBSQ HOSP IP/OBS MODERATE 35: CPT | Performed by: HOSPITALIST

## 2017-08-04 PROCEDURE — 700111 HCHG RX REV CODE 636 W/ 250 OVERRIDE (IP): Performed by: INTERNAL MEDICINE

## 2017-08-04 PROCEDURE — 700102 HCHG RX REV CODE 250 W/ 637 OVERRIDE(OP): Performed by: HOSPITALIST

## 2017-08-04 PROCEDURE — 700102 HCHG RX REV CODE 250 W/ 637 OVERRIDE(OP): Performed by: INTERNAL MEDICINE

## 2017-08-04 PROCEDURE — 80069 RENAL FUNCTION PANEL: CPT

## 2017-08-04 PROCEDURE — 770020 HCHG ROOM/CARE - TELE (206)

## 2017-08-04 PROCEDURE — A9270 NON-COVERED ITEM OR SERVICE: HCPCS | Performed by: HOSPITALIST

## 2017-08-04 PROCEDURE — A9270 NON-COVERED ITEM OR SERVICE: HCPCS | Performed by: INTERNAL MEDICINE

## 2017-08-04 PROCEDURE — 90935 HEMODIALYSIS ONE EVALUATION: CPT

## 2017-08-04 PROCEDURE — 700111 HCHG RX REV CODE 636 W/ 250 OVERRIDE (IP): Performed by: EMERGENCY MEDICINE

## 2017-08-04 RX ORDER — HEPARIN SODIUM 1000 [USP'U]/ML
INJECTION, SOLUTION INTRAVENOUS; SUBCUTANEOUS
Status: COMPLETED
Start: 2017-08-04 | End: 2017-08-04

## 2017-08-04 RX ADMIN — STANDARDIZED SENNA CONCENTRATE AND DOCUSATE SODIUM 2 TABLET: 8.6; 5 TABLET, FILM COATED ORAL at 09:27

## 2017-08-04 RX ADMIN — METOPROLOL TARTRATE 100 MG: 50 TABLET, FILM COATED ORAL at 22:06

## 2017-08-04 RX ADMIN — STANDARDIZED SENNA CONCENTRATE AND DOCUSATE SODIUM 2 TABLET: 8.6; 5 TABLET, FILM COATED ORAL at 22:05

## 2017-08-04 RX ADMIN — HEPARIN SODIUM 5000 UNITS: 5000 INJECTION, SOLUTION INTRAVENOUS; SUBCUTANEOUS at 05:27

## 2017-08-04 RX ADMIN — HYDRALAZINE HYDROCHLORIDE 20 MG: 20 INJECTION INTRAMUSCULAR; INTRAVENOUS at 19:49

## 2017-08-04 RX ADMIN — HYDRALAZINE HYDROCHLORIDE 100 MG: 50 TABLET, FILM COATED ORAL at 15:03

## 2017-08-04 RX ADMIN — HYDRALAZINE HYDROCHLORIDE 20 MG: 20 INJECTION INTRAMUSCULAR; INTRAVENOUS at 05:27

## 2017-08-04 RX ADMIN — HEPARIN SODIUM 2000 UNITS: 1000 INJECTION, SOLUTION INTRAVENOUS; SUBCUTANEOUS at 17:11

## 2017-08-04 RX ADMIN — HYDRALAZINE HYDROCHLORIDE 100 MG: 50 TABLET, FILM COATED ORAL at 09:28

## 2017-08-04 RX ADMIN — CLONIDINE HYDROCHLORIDE 0.3 MG: 0.1 TABLET ORAL at 09:28

## 2017-08-04 RX ADMIN — HYDRALAZINE HYDROCHLORIDE 100 MG: 50 TABLET, FILM COATED ORAL at 22:06

## 2017-08-04 RX ADMIN — CLONIDINE HYDROCHLORIDE 0.3 MG: 0.1 TABLET ORAL at 22:06

## 2017-08-04 RX ADMIN — HEPARIN SODIUM 5000 UNITS: 5000 INJECTION, SOLUTION INTRAVENOUS; SUBCUTANEOUS at 22:09

## 2017-08-04 RX ADMIN — OXYCODONE HYDROCHLORIDE 5 MG: 5 TABLET ORAL at 22:59

## 2017-08-04 RX ADMIN — RENAGEL 800 MG: 800 TABLET ORAL at 10:49

## 2017-08-04 RX ADMIN — METOPROLOL TARTRATE 100 MG: 50 TABLET, FILM COATED ORAL at 09:28

## 2017-08-04 RX ADMIN — HEPARIN SODIUM 5000 UNITS: 5000 INJECTION, SOLUTION INTRAVENOUS; SUBCUTANEOUS at 14:22

## 2017-08-04 ASSESSMENT — PAIN SCALES - GENERAL
PAINLEVEL_OUTOF10: 0
PAINLEVEL_OUTOF10: 1
PAINLEVEL_OUTOF10: 4
PAINLEVEL_OUTOF10: 2
PAINLEVEL_OUTOF10: 3
PAINLEVEL_OUTOF10: 0

## 2017-08-04 ASSESSMENT — ENCOUNTER SYMPTOMS
HEADACHES: 0
FEVER: 0
NAUSEA: 0
ROS GI COMMENTS: TOLERATING A DIET
CHILLS: 0
VOMITING: 0
SHORTNESS OF BREATH: 0

## 2017-08-04 NOTE — PROGRESS NOTES
HEMODIALYSIS NOTES:     HD today x 3 hours per Dr. Flower. Initiated at 1414 and ended at 1714. Patient stable , denies pain , no SOB at the start of HD. Patient started to increase in SBP, 170-180's systolic on the 2nd hour of HD. Alma Rosa PEREIRA aware and BP medications given. Monitored patient accordingly. Patient also complained headache 6-7/10 pain scale, +nausea observed.   BP stable post HD. Please see paper flowsheet for details.    UF Net: 2,460 mL    Blood returned. Applied gauze and held L AVF site for 10 minutes. Verified no bleeding. Bruit and thrill present post dialysis. Instructions given to Primary RN that if bleeding occurs on the AVF site, change dressing and held the site with pressure. Report given to ROBERT Fountain RN.

## 2017-08-04 NOTE — PROGRESS NOTES
[facility] Hospitalist Progress Note    Date of Service: 2017    Chief Complaint  37 y.o. female admitted 2017 with headache.    Interval Problem Update  Ms. Billings has a hx of ESRD and htn that was sent from the dialysis center with a headache and n/v and a SBP of 244 consistent with hypertensive emergency. She has been admitted to the ICU for an IV nicardipine drip.   Her headache has lessened today but she did get one dose of oxycodone last night. She was given 20 mg IV hydralazine last night due to elevated BP. She is anuric. 3 liters off with dialysis yesterday and she will have dialysis today. She is quite adamant that she has been taking her BP meds.  Consultants/Specialty  nephrology    Disposition  tele        Review of Systems   Constitutional: Negative for fever and chills.   Respiratory: Negative for shortness of breath.    Cardiovascular: Negative for chest pain and leg swelling.   Gastrointestinal: Negative for nausea and vomiting.        Tolerating a diet   Neurological: Negative for headaches.        No weakness.      Physical Exam  Laboratory/Imaging   Hemodynamics  Temp (24hrs), Av.9 °C (98.5 °F), Min:36.5 °C (97.7 °F), Max:37.2 °C (99 °F)   Temperature: 37 °C (98.6 °F)  Pulse  Av.2  Min: 54  Max: 113 Heart Rate (Monitored): 67  NIBP: 136/77 mmHg      Respiratory      Respiration: 16, Pulse Oximetry: 96 %     Work Of Breathing / Effort: Mild  RUL Breath Sounds: Clear, RML Breath Sounds: Clear, RLL Breath Sounds: Clear, YOUNG Breath Sounds: Clear, LLL Breath Sounds: Clear    Fluids    Intake/Output Summary (Last 24 hours) at 17 0837  Last data filed at 17 2200   Gross per 24 hour   Intake 1055.34 ml   Output   2960 ml   Net -1904.66 ml       Nutrition  Orders Placed This Encounter   Procedures   • Diet Order     Standing Status: Standing      Number of Occurrences: 1      Standing Expiration Date:      Order Specific Question:  Diet:     Answer:  2 Gram Sodium [7]     Order  Specific Question:  Diet:     Answer:  Renal [8]     Physical Exam   Constitutional: She is oriented to person, place, and time. No distress.   thin   Neck: Neck supple.   Cardiovascular: Normal rate and regular rhythm.    Murmur heard.  Abdominal: Soft. She exhibits no distension. There is no tenderness.   Musculoskeletal: She exhibits no edema or tenderness.   Left arm fistula   Neurological: She is alert and oriented to person, place, and time.   Psychiatric: She has a normal mood and affect. Her behavior is normal.       Recent Labs      08/02/17   1354  08/03/17   0441   WBC  4.5*  8.3   RBC  3.13*  3.47*   HEMOGLOBIN  9.4*  10.3*   HEMATOCRIT  28.6*  31.8*   MCV  91.4  91.6   MCH  30.0  29.7   MCHC  32.9*  32.4*   RDW  43.0  44.6   PLATELETCT  131*  143*   MPV  10.5  10.1     Recent Labs      08/02/17   1354  08/03/17   0441  08/04/17   0724   SODIUM  138  138  135   POTASSIUM  3.8  5.2  4.8   CHLORIDE  97  97  96   CO2  33  29  30   GLUCOSE  110*  121*  83   BUN  30*  42*  23*   CREATININE  5.26*  7.84*  6.49*   CALCIUM  9.4  9.6  9.7                      Assessment/Plan     * Hypertensive emergency  Assessment & Plan  Nicardipine drip with PRN IV hydralazine and labetalol given and now off.  Resumed home rx of metoprolol, clonidine, hydralazine and BP is now appropriate.  We had a long discussion and Ms. Billings states that she has been compliant with her meds at home.   Nephrectomies may be a consideration if BP control cannot be achieved in the future.      Hypertensive urgency  Assessment & Plan  Hydralazine, labetalol prn  Resume home rx  Cont cardiac monitor  F/u CT head  HD per nephro    ESRD (end stage renal disease) on dialysis (CMS-[facility])  Assessment & Plan  Dr. Flower, nephrology consulted for dialysis.  She did not complete treatment outpatient.    Hypertensive encephalopathy (present on admission)  Assessment & Plan  With confusion on admission which resolved.    History of lupus nephritis  (present on admission)  Assessment & Plan  Resulting in renal failure.    Pulmonary hypertension (CMS-HCC) (present on admission)  Assessment & Plan  Echo 6/17:  CONCLUSIONS  Comapred to the prior echo dated 8/19/2016, there has been a   normalization of the left ventricular systolic function.  Estimated right ventricular systolic pressure  is 50 mmHg.  Left ventricular ejection fraction is visually estimated to be 60%.  Grade I diastolic dysfunction.    Anemia of chronic disease (present on admission)  Assessment & Plan  Monitor, Hb 10, consistent with chronic renal disease.     Nausea and vomiting (present on admission)  Assessment & Plan  Antiemetics prn  zofran rtc    Headache  Assessment & Plan   CT head is negative for bleed.  Her pain has resolved.      Labs reviewed and Medications reviewed  Williamson catheter: No Williamson      DVT Prophylaxis: Heparin

## 2017-08-04 NOTE — CARE PLAN
Problem: Communication  Goal: The ability to communicate needs accurately and effectively will improve  Intervention: Use communication aids and/or /Language Line as appropriate  Will use an  as needed for medical translations.

## 2017-08-04 NOTE — DIETARY
"Nutrition Services: Patient seen for low BMI of 19.76    37 y.o. female with admitting DX of Hypertensive urgency, Headache, ESRD (end stage renal disease) on dialysis     Allergies: Iodine; Labetalol; Morphine; and Pork allergy  Height: 170 cm (5' 6.93\")  Weight: 57.1 kg (125 lb 14.1 oz)  Body mass index is 19.76 kg/(m^2).  No weight loss prior to admission and good appetite per patient.  Patient receptive to snacks between meals TID.    Labs, medications and past medical history reviewed.    Recommend:  Encourage PO intake.  Nutrition Representative will continue to see her for ongoing meal and snack preferences.  RD following.      "

## 2017-08-04 NOTE — CARE PLAN
Problem: Pain Management  Goal: Pain level will decrease to patient’s comfort goal  Outcome: PROGRESSING AS EXPECTED  Using nurse pain scale, q2hour pain obs,  medicated per MAR, non pharm measures in place as well.     Problem: Psychosocial Needs:  Goal: Level of anxiety will decrease  Outcome: PROGRESSING AS EXPECTED  Anxiety level decreased d/t better pain control.  Medication available per MD orders/MAR if needed.

## 2017-08-04 NOTE — CARE PLAN
Problem: Mobility  Goal: Risk for activity intolerance will decrease  Intervention: Provide rest periods  Will ambulate pt around hallway as tolerated and will provide adequate rest periods between ambulation sessions.

## 2017-08-05 VITALS
RESPIRATION RATE: 17 BRPM | TEMPERATURE: 98.8 F | OXYGEN SATURATION: 97 % | SYSTOLIC BLOOD PRESSURE: 140 MMHG | HEART RATE: 66 BPM | HEIGHT: 67 IN | WEIGHT: 124.12 LBS | DIASTOLIC BLOOD PRESSURE: 91 MMHG | BODY MASS INDEX: 19.48 KG/M2

## 2017-08-05 PROBLEM — I67.4 HYPERTENSIVE ENCEPHALOPATHY: Status: RESOLVED | Noted: 2017-08-03 | Resolved: 2017-08-05

## 2017-08-05 PROBLEM — R51 HEADACHE(784.0): Status: RESOLVED | Noted: 2017-08-02 | Resolved: 2017-08-05

## 2017-08-05 PROBLEM — I16.1 HYPERTENSIVE EMERGENCY: Status: RESOLVED | Noted: 2017-08-02 | Resolved: 2017-08-05

## 2017-08-05 PROCEDURE — A9270 NON-COVERED ITEM OR SERVICE: HCPCS | Performed by: INTERNAL MEDICINE

## 2017-08-05 PROCEDURE — 700111 HCHG RX REV CODE 636 W/ 250 OVERRIDE (IP): Performed by: INTERNAL MEDICINE

## 2017-08-05 PROCEDURE — 700102 HCHG RX REV CODE 250 W/ 637 OVERRIDE(OP): Performed by: INTERNAL MEDICINE

## 2017-08-05 PROCEDURE — 99239 HOSP IP/OBS DSCHRG MGMT >30: CPT | Performed by: HOSPITALIST

## 2017-08-05 RX ADMIN — RENAGEL 800 MG: 800 TABLET ORAL at 08:29

## 2017-08-05 RX ADMIN — HYDRALAZINE HYDROCHLORIDE 100 MG: 50 TABLET, FILM COATED ORAL at 08:29

## 2017-08-05 RX ADMIN — CLONIDINE HYDROCHLORIDE 0.3 MG: 0.1 TABLET ORAL at 08:29

## 2017-08-05 RX ADMIN — METOPROLOL TARTRATE 100 MG: 50 TABLET, FILM COATED ORAL at 08:29

## 2017-08-05 RX ADMIN — STANDARDIZED SENNA CONCENTRATE AND DOCUSATE SODIUM 2 TABLET: 8.6; 5 TABLET, FILM COATED ORAL at 08:29

## 2017-08-05 RX ADMIN — HEPARIN SODIUM 5000 UNITS: 5000 INJECTION, SOLUTION INTRAVENOUS; SUBCUTANEOUS at 06:03

## 2017-08-05 ASSESSMENT — PAIN SCALES - GENERAL
PAINLEVEL_OUTOF10: 0
PAINLEVEL_OUTOF10: 0

## 2017-08-05 NOTE — PROGRESS NOTES
2RN skin check done on patient with KELLI Parks. Patient's skin is completely intact, no sign of abnormalities seen

## 2017-08-05 NOTE — PROGRESS NOTES
Report taken from ICU RN Rashmi, patient arrive to unit with ICU RN and CNA, patient's  and son are with patient. Patient walked from wheelchair to bed, patient steady on her feet, CNA in room and did vital sign ofn patient, Assessment done on patient, 2RN skin check done on patient, patient complain of headache, tylenol offer but patient stated that tylenol did not help last time for headache and requested oxycodone, medication given to patient. Patient and family denies any need at this time

## 2017-08-05 NOTE — DISCHARGE INSTRUCTIONS
Discharge Instructions    Discharged to home by car with relative. Discharged via walking, hospital escort: Refused.  Special equipment needed: Not Applicable    Be sure to schedule a follow-up appointment with your primary care doctor or any specialists as instructed.     Discharge Plan:   Influenza Vaccine Indication: Not indicated: Previously immunized this influenza season and > 8 years of age    I understand that a diet low in cholesterol, fat, and sodium is recommended for good health. Unless I have been given specific instructions below for another diet, I accept this instruction as my diet prescription.   Other diet: Renal    Special Instructions: None    · Is patient discharged on Warfarin / Coumadin?   No     · Is patient Post Blood Transfusion?  No    Depression / Suicide Risk    As you are discharged from this [facility] facility, it is important to learn how to keep safe from harming yourself.    Recognize the warning signs:  · Abrupt changes in personality, positive or negative- including increase in energy   · Giving away possessions  · Change in eating patterns- significant weight changes-  positive or negative  · Change in sleeping patterns- unable to sleep or sleeping all the time   · Unwillingness or inability to communicate  · Depression  · Unusual sadness, discouragement and loneliness  · Talk of wanting to die  · Neglect of personal appearance   · Rebelliousness- reckless behavior  · Withdrawal from people/activities they love  · Confusion- inability to concentrate     If you or a loved one observes any of these behaviors or has concerns about self-harm, here's what you can do:  · Talk about it- your feelings and reasons for harming yourself  · Remove any means that you might use to hurt yourself (examples: pills, rope, extension cords, firearm)  · Get professional help from the community (Mental Health, Substance Abuse, psychological counseling)  · Do not be alone:Call your Safe Contact-  "someone whom you trust who will be there for you.  · Call your local CRISIS HOTLINE 250-9348 or 318-860-0778  · Call your local Children's Mobile Crisis Response Team [place] (135) 595-2749 or www.[employer]  · Call the toll free National Suicide Prevention Hotlines   · National Suicide Prevention Lifeline 008-018-TGNC (3314)  · National [employer] Line Network 800-SUICIDE (085-6055)    Hipertensión  (Hypertension)  Cuando el corazón late bombea la tavo a través de las arterias. La fuerza que se origina es la presión arterial. Si la presión es demasiado elevada, se denomina hipertensión. El peligro radica en que puede sufrirla y no saberlo. Hipertensión puede significar que sher corazón debe trabajar más intensamente para bombear tavo. Las arterias pueden estar estrechas o rígidas. El trabajo extra aumenta el riesgo de enfermedades cardíacas, ictus y otros problemas.   La presión arterial está formada por dos números: el número mayor sobre el número patsy, por [id number]. Se señala \"110/72\". Los valores ideales son por debajo de 120 para el número más alto (sistólica) y por debajo de 80 para el más bajo (diastólica). Anote sher presión sanguínea hoy.  Debe prestar mucha atención a sher presión arterial si sufre alguna otra enfermedad daja:  · Insuficiencia cardíaca  · Ataques cardiacos previos  · Diabetes  · Enfermedad renal crónica  · Ictus previo  · Múltiples factores de riesgo para enfermedades cardíacas  Para diagnosticar si usted sufre hipertensión arterial, debe medirse la presión mientras encuentra sentado con el brazo elevado a la altura del nivel del corazón. Debe medirse al menos 2 veces. Kaia única lectura de presión arterial elevada (especialmente en el servicio de emergencias) no significa que necesita tratamiento. Hay enfermedades en las que la presión arterial es diferente en ambos brazos. Es importante que consulte rápidamente con sher médico para un control.  La mayoría de las personas sufren " hipertensión esencial, lo que significa que no tiene amisha causa específica. Sarah tipo de hipertensión puede bajarse modificando algunos factores en el estilo de randy daja:  2. Estrés.  3. El consumo de cigarrillos.  4. La falta de actividad física.  5. Peso excesivo  6. Consumo de drogas y alcohol.  7. Consumiendo menos sal  La mayoría de las personas no tienen síntomas hasta que la hipertensión ocasiona un daño en el organismo. El tratamiento efectivo puede evitar, demorar o reducir irene daño.  TRATAMIENTO:  El tratamiento para la hipertensión, cuando se ha identificado amisha causa, está dirigido a la misma Hay un gran número en medicamentos para tratarla. Se agrupan en diferentes categorías y el médico seleccionará los medicamentos indicados para usted. Muchos medicamentos disponibles tienen efectos secundarios. Debe comentar los efectos secundarios con sher médico.  Si la presión arterial permanece elevada después de modificar sher estilo de randy o comenzar a ella medicamentos:  2. Los medicamentos deben ser reemplazados  3. Puede ser necesario evaluar otros problemas.  4. Debe estar seguro que comprende las indicaciones, que sabe cómo y cuándo ella los medicamentos.  5. Asegúrese de realizar un control con sher médico dentro del tiempo indicado (generalmente dentro de las dos semanas) para volver a evaluar la presión arterial y revisar los medicamentos prescritos.  6. Si está tomando más de un medicamento para la presión arterial, asegúrese que sabe cómo y en qué momentos debe tomarlos. Ella los medicamentos al mismo tiempo puede tony daja resultado un gran descenso en la presión arterial.  SOLICITE ATENCIÓN MÉDICA DE INMEDIATO SI PRESENTA:  2. Dolor de haleigh intenso, visión borrosa o cambios en la visión, o confusión.  3. Debilidad o adormecimientos inusuales o sensación de desmayo.  4. Dolor de pecho o abdominal intenso, vómitos o problemas para respirar.  ASEGÚRESE QUE:   2. Comprende estas  instrucciones.  3. Controlará sher enfermedad.  4. Solicitará ayuda inmediatamente si no mejora o si empeora.  Document Released: 12/18/2006 Document Revised: 03/11/2013  [employer]® Patient Information ©2014 [employer].

## 2017-08-05 NOTE — PROGRESS NOTES
"[facility]    3.0 hours hemodialysis treatment initiated at 1616am and completed at 1916. Pt stable upon assessment. BP elevated but assymptomatic. Denies any pain and discomfort. L Arm AVF, (+) Thrill and Bruit,accessed w/ 15G 1\" needle, patent, no s/sx of infection noted. Dr. Flower notified of tx start.    Condition stable throughout tx. Heparin given as ordered. Last hour of HD treament, BP started to elevate, Pt continue to denies any discomfort, no SOB and No chest pain.    Post HD tx, Bld returned w/ NS rinseback.  Pt stable and  BP elevated, c/o dizziness but tolerable per Pt. No active bleeding at the needle access sites. Post HD needle removal, direct pressure to Left arm AVF  For 10mins. L AVF (+) thrill and bruit. Primary RN informed to monitor L AVF access for any breakthrough bleeding.      Net UF 2000 mL.     Report given to Primary RN. See paper flow sheet for details.  "

## 2017-08-05 NOTE — PROGRESS NOTES
Hospital Medicine Progress Note, Adult, Complex               Author: Shadi Mckeonchrist Date & Time created: 8/4/2017  5:58 PM     Interval History:  37 year old with ESRD, HD on MWF, came in with severe hypertension, and now with improvement. BP after HD today 112 systolic range. Feeling better. Txf from ICU to floor today.     Review of Systems:  Review of Systems   Constitutional: Negative for fever and chills.   All other systems reviewed and are negative.      Physical Exam:  Physical Exam   Constitutional: She is oriented to person, place, and time. She appears well-developed and well-nourished.   Cardiovascular: Normal rate and regular rhythm.    Pulmonary/Chest: Effort normal and breath sounds normal.   Musculoskeletal: She exhibits no edema or tenderness.   Neurological: She is alert and oriented to person, place, and time.   Skin: Skin is warm and dry.       Labs:        Invalid input(s): [id number]  Recent Labs      08/02/17   1354  08/02/17   2314  08/03/17   0441   TROPONINI  <0.01  0.03  0.08*     Recent Labs      08/02/17   1354  08/03/17   0441  08/04/17   0724   SODIUM  138  138  135   POTASSIUM  3.8  5.2  4.8   CHLORIDE  97  97  96   CO2  33  29  30   BUN  30*  42*  23*   CREATININE  5.26*  7.84*  6.49*   PHOSPHORUS   --    --   4.3   CALCIUM  9.4  9.6  9.7     Recent Labs      08/02/17   1354  08/03/17   0441  08/04/17   0724   ALTSGPT  19   --    --    ASTSGOT  13   --    --    ALKPHOSPHAT  71   --    --    TBILIRUBIN  0.6   --    --    GLUCOSE  110*  121*  83     Recent Labs      08/02/17   1354  08/03/17   0441   RBC  3.13*  3.47*   HEMOGLOBIN  9.4*  10.3*   HEMATOCRIT  28.6*  31.8*   PLATELETCT  131*  143*     Recent Labs      08/02/17   1354  08/03/17   0441   WBC  4.5*  8.3   NEUTSPOLYS  79.70*  87.50*   LYMPHOCYTES  9.00*  7.10*   MONOCYTES  7.20  4.50   EOSINOPHILS  3.40  0.00   BASOPHILS  0.70  0.50   ASTSGOT  13   --    ALTSGPT  19   --    ALKPHOSPHAT  71   --    TBILIRUBIN  0.6   --             Hemodynamics:  Temp (24hrs), Av.1 °C (98.7 °F), Min:36.7 °C (98.1 °F), Max:37.4 °C (99.3 °F)  Temperature: 37.4 °C (99.3 °F)  Pulse  Av.4  Min: 54  Max: 113Heart Rate (Monitored): 68  NIBP: 111/71 mmHg     Respiratory:    Respiration: 13, Pulse Oximetry: 95 %     Work Of Breathing / Effort: Mild  RUL Breath Sounds: Clear, RML Breath Sounds: Clear, RLL Breath Sounds: Clear, YOUNG Breath Sounds: Clear, LLL Breath Sounds: Clear  Fluids:    Intake/Output Summary (Last 24 hours) at 17 1758  Last data filed at 17 1700   Gross per 24 hour   Intake    775 ml   Output      0 ml   Net    775 ml     Weight: 57.1 kg (125 lb 14.1 oz)  GI/Nutrition:  Orders Placed This Encounter   Procedures   • Diet Order     Standing Status: Standing      Number of Occurrences: 1      Standing Expiration Date:      Order Specific Question:  Diet:     Answer:  2 Gram Sodium [7]     Order Specific Question:  Diet:     Answer:  Renal [8]     Medical Decision Making, by Problem:  Active Hospital Problems    Diagnosis   • *Hypertensive emergency [I16.1]   • Hypertensive encephalopathy [I67.4]   • ESRD (end stage renal disease) on dialysis (CMS-[facility]) [N18.6, Z99.2]   • Headache [R51]   • Nausea and vomiting [R11.2]   • Anemia of chronic disease [D63.8]   • Pulmonary hypertension (CMS-HCC) [I27.2]   • History of lupus nephritis [Z87.448]     1. ESRD -HD MW, next HD Monday  2. HTN - Better control, unclear why it became uncontrolled; net volume loss only 1.7L  3. Anemia - will place back on epogen    Will follow      Core Measures

## 2017-08-05 NOTE — PROGRESS NOTES
"Pt transported to dialysis via pt transporter \"[facility]\" at 1550. Left message on  Pts spouses phone about going to dialysis.   "

## 2017-08-05 NOTE — DISCHARGE SUMMARY
CHIEF COMPLAINT ON ADMISSION  Chief Complaint   Patient presents with   • Blood Pressure Problem     high blood pressure       CODE STATUS  Full Code    HPI & HOSPITAL COURSE  This is a 37 y.o. female here with hypertensive urgency after possibly not taking her medications. She was admitted and dialyzed and placed on a nicardipine drip. She was resumed on her home medications and had good control of her overall blood pressure. She felt well and was anxious for discharge home.     Therefore, she is discharged in good and stable condition with close outpatient follow-up.      DISCHARGE PROBLEM LIST  Principal Problem (Resolved):    Hypertensive emergency POA: Yes  Active Problems:    ESRD (end stage renal disease) on dialysis (CMS-HCC) POA: Yes      Overview: HD this morning for recurrent hyperkalemia    Anemia of chronic disease POA: Yes    History of lupus nephritis POA: Yes    Pulmonary hypertension (CMS-HCC) POA: Yes  Resolved Problems:    Hypertensive encephalopathy POA: Yes    Nausea and vomiting POA: Yes    Headache POA: Yes      FOLLOW UP  No future appointments.  Quinton Saldana M.D.  [facility] [address] [address]  [phone]            MEDICATIONS ON DISCHARGE   Mago Billings Nathan Kathleenio   Home Medication Instructions SANJUANITA:[id number]    Printed on:08/05/17 8753   Medication Information                      aspirin EC (ECOTRIN) 81 MG Tablet Delayed Response  Take 81 mg by mouth as needed (For headache).             B Complex-C-Folic Acid (DIALYVITE 800 PO)  Take 1 Tab by mouth every evening.             clonidine (CATAPRESS) 0.3 MG Tab  Take 0.3 mg by mouth 2 times a day.             hydrALAZINE (APRESOLINE) 100 MG tablet  Take 1 Tab by mouth 3 times a day. Do not take if your blood pressure is <100/60             metoprolol (LOPRESSOR) 100 MG Tab  Take 1 Tab by mouth 2 times a day.             RENVELA 800 MG Tab  TAKE 1 TABLET BY MOUTH 3 TIMES DAILY                  DIET  Orders Placed This  Encounter   Procedures   • Diet Order     Standing Status: Standing      Number of Occurrences: 1      Standing Expiration Date:      Order Specific Question:  Diet:     Answer:  2 Gram Sodium [7]     Order Specific Question:  Diet:     Answer:  Renal [8]       ACTIVITY  As tolerated.  Weight bearing as tolerated      CONSULTATIONS  Shadi Flower MD - nephrology    PROCEDURES  None    LABORATORY  Lab Results   Component Value Date/Time    SODIUM 135 08/04/2017 07:24 AM    POTASSIUM 4.8 08/04/2017 07:24 AM    CHLORIDE 96 08/04/2017 07:24 AM    CO2 30 08/04/2017 07:24 AM    GLUCOSE 83 08/04/2017 07:24 AM    BUN 23* 08/04/2017 07:24 AM    CREATININE 6.49* 08/04/2017 07:24 AM        Lab Results   Component Value Date/Time    WBC 8.3 08/03/2017 04:41 AM    HEMOGLOBIN 10.3* 08/03/2017 04:41 AM    HEMATOCRIT 31.8* 08/03/2017 04:41 AM    PLATELET COUNT 143* 08/03/2017 04:41 AM        Total time of the discharge process exceeds 31 minutes

## 2017-08-05 NOTE — PROGRESS NOTES
RN received report for Mar[place] with dialysis.  RN updated and Informed that Pt is returning to unit.

## 2017-08-05 NOTE — PROGRESS NOTES
Patient discharged to home by the Physician.  Romansh language line used for translation of discharge instructions including medications, follow-up appointments, and signs/symptoms to watch for.  Patient verbalized understanding.  PIV removed by RN with tip intact.

## 2017-08-05 NOTE — CARE PLAN
Problem: Pain Management  Goal: Pain level will decrease to patient’s comfort goal  Outcome: PROGRESSING AS EXPECTED  Pain control with prn pain medication     Problem: Mobility  Goal: Risk for activity intolerance will decrease  Outcome: PROGRESSING AS EXPECTED  Patient steady on her feet, educated to call staff if help is needed

## 2017-08-21 LAB — EKG IMPRESSION: NORMAL

## 2017-08-30 LAB — EKG IMPRESSION: NORMAL

## 2017-09-13 ENCOUNTER — HOSPITAL ENCOUNTER (OUTPATIENT)
Dept: RADIOLOGY | Facility: MEDICAL CENTER | Age: 37
End: 2017-09-13
Attending: PHYSICIAN ASSISTANT
Payer: COMMERCIAL

## 2017-09-13 ENCOUNTER — OFFICE VISIT (OUTPATIENT)
Dept: URGENT CARE | Facility: PHYSICIAN GROUP | Age: 37
End: 2017-09-13
Payer: COMMERCIAL

## 2017-09-13 VITALS
HEIGHT: 66 IN | OXYGEN SATURATION: 100 % | WEIGHT: 126.32 LBS | RESPIRATION RATE: 14 BRPM | BODY MASS INDEX: 20.3 KG/M2 | HEART RATE: 93 BPM | TEMPERATURE: 97.6 F

## 2017-09-13 DIAGNOSIS — M25.561 ACUTE PAIN OF RIGHT KNEE: ICD-10-CM

## 2017-09-13 DIAGNOSIS — R03.0 ELEVATED BLOOD PRESSURE READING: ICD-10-CM

## 2017-09-13 PROCEDURE — 73562 X-RAY EXAM OF KNEE 3: CPT | Mod: RT

## 2017-09-13 PROCEDURE — 99214 OFFICE O/P EST MOD 30 MIN: CPT | Performed by: PHYSICIAN ASSISTANT

## 2017-09-14 ASSESSMENT — ENCOUNTER SYMPTOMS
DIZZINESS: 0
FEVER: 0
HEADACHES: 0
ABDOMINAL PAIN: 0
SHORTNESS OF BREATH: 0
VOMITING: 0
NAUSEA: 0
DIARRHEA: 0
CHILLS: 0

## 2017-09-14 NOTE — PROGRESS NOTES
"Subjective:      Mago Farfan is a 37 y.o. female who presents with Knee Pain (goes up right leg )        Patient is accompanied by her  and son, who are both acting as translators.     Knee Pain   This is a new problem. The current episode started in the past 7 days (4-5 days). The problem occurs constantly. The problem has been unchanged. Pertinent negatives include no abdominal pain, chest pain, chills, fever, headaches, nausea or vomiting.     Patient reports right knee pain x 4-5 days. She denies any known injuries or previous knee pain. She has severe kidney disease and is currently on dialysis. She has a longstanding history of HTN secondary to advanced kidney disease. She has a history of non compliance with her blood pressure medications, and has been hospitalized multiple times for uncontrolled HTN as a result. She had dialysis today and states her BP is \"always high\" after treatments. She is due to take her nightly dose of her blood pressure medications but does not have the medications with her. She denies any chest pain, SOB, headache, dizziness, abdominal pain, or confusion.     Review of Systems   Constitutional: Negative for chills and fever.   Respiratory: Negative for shortness of breath.    Cardiovascular: Negative for chest pain.   Gastrointestinal: Negative for abdominal pain, diarrhea, nausea and vomiting.   Genitourinary: Negative.    Musculoskeletal:        Positive for knee pain   Neurological: Negative for dizziness and headaches.          Objective:     Pulse 93   Temp 36.4 °C (97.6 °F)   Resp 14   Ht 1.676 m (5' 6\")   Wt 57.3 kg (126 lb 5.2 oz)   SpO2 100%   BMI 20.39 kg/m²      Physical Exam   Constitutional: She is oriented to person, place, and time. She appears well-developed and well-nourished. No distress.   HENT:   Head: Normocephalic and atraumatic.   Eyes: Pupils are equal, round, and reactive to light.   Neck: Normal range of motion.   Cardiovascular: " Normal rate, regular rhythm and normal heart sounds.    No murmur heard.  Pulmonary/Chest: Effort normal and breath sounds normal. No respiratory distress. She has no wheezes. She has no rales.   Musculoskeletal: Normal range of motion.        Right knee: She exhibits swelling and effusion. She exhibits normal range of motion, no ecchymosis, no deformity, no LCL laxity, no bony tenderness and no MCL laxity. Tenderness found. Lateral joint line tenderness noted. No MCL and no LCL tenderness noted.        Legs:  Normal gait.    Neurological: She is alert and oriented to person, place, and time.   Skin: Skin is warm and dry. She is not diaphoretic.   Psychiatric: She has a normal mood and affect. Her behavior is normal.   Nursing note and vitals reviewed.         PMH:  has a past medical history of Dialysis patient (CMS-[facility]); Hypertension; and Renal disorder.  MEDS:   Current Outpatient Prescriptions:   •  RENVELA 800 MG Tab, TAKE 1 TABLET BY MOUTH 3 TIMES DAILY , Disp: 150 Tab, Rfl: 11  •  metoprolol (LOPRESSOR) 100 MG Tab, Take 1 Tab by mouth 2 times a day., Disp: 60 Tab, Rfl: 3  •  hydrALAZINE (APRESOLINE) 100 MG tablet, Take 1 Tab by mouth 3 times a day. Do not take if your blood pressure is <100/60, Disp: 90 Tab, Rfl: 2  •  aspirin EC (ECOTRIN) 81 MG Tablet Delayed Response, Take 81 mg by mouth as needed (For headache)., Disp: , Rfl:   •  B Complex-C-Folic Acid (DIALYVITE 800 PO), Take 1 Tab by mouth every evening., Disp: , Rfl:   •  clonidine (CATAPRESS) 0.3 MG Tab, Take 0.3 mg by mouth 2 times a day., Disp: , Rfl:   ALLERGIES:   Allergies   Allergen Reactions   • Iodine Rash     Rash   • Labetalol Palpitations     Fast heart rate, rash, HTN   • Morphine      Pt states that she can tolerate small dose of morphine (can tolerate up to 2mg dosage).   • Pork Allergy Itching     Itchy skin     SURGHX:   Past Surgical History:   Procedure Laterality Date   • OTHER CARDIAC SURGERY       SOCHX:  reports that she has  never smoked. She has never used smokeless tobacco. She reports that she does not drink alcohol or use drugs.  FH: family history includes Heart Disease in her mother.       Assessment/Plan:     1. Acute pain of right knee  - DX-KNEE 3 VIEWS RIGHT; Future  Impression       No acute fracture. Knee joint effusion.        Informed patient and her family of x-ray results. Encouraged RICE therapy. Patient is unable to take nsaids due to advance kidney disease. Encouraged OTC tylenol as needed for symptomatic relief. Icing 2-3 times daily with elevation. Knee wrapped with ACE bandage at today's visit. Advised to get compressive knee sleeve from a pharmacy and to wear full time during the day until symptoms start improving. Patient should follow up with her PCP if symptoms persist/worsen over the next 1-2 weeks.     2. Elevated blood pressure reading    Patient advised she needs to go to the ED for blood pressure management. She declines at this time and states she just needs to go home and take her blood pressure medication. She does have a blood pressure machine at home. Encouraged to monitor blood pressure closely and go straight to the ED tonight if it does not normalize after taking her medication, or for development of any chest pain, SOB, headaches, dizziness, etc. She states understanding and says she will go to the ED tonight if needed.

## 2017-10-04 PROBLEM — R51.9 HEADACHE: Status: RESOLVED | Noted: 2017-08-02 | Resolved: 2017-08-05

## 2018-02-13 RX ORDER — CLONIDINE HYDROCHLORIDE 0.3 MG/1
TABLET ORAL
Qty: 180 TAB | Refills: 3 | Status: SHIPPED
Start: 2018-02-13 | End: 2020-02-18

## 2018-03-22 ENCOUNTER — OFFICE VISIT (OUTPATIENT)
Dept: INTERNAL MEDICINE | Facility: MEDICAL CENTER | Age: 38
End: 2018-03-22
Payer: COMMERCIAL

## 2018-03-22 VITALS
TEMPERATURE: 97.7 F | SYSTOLIC BLOOD PRESSURE: 200 MMHG | BODY MASS INDEX: 21.52 KG/M2 | RESPIRATION RATE: 20 BRPM | WEIGHT: 129.2 LBS | DIASTOLIC BLOOD PRESSURE: 120 MMHG | OXYGEN SATURATION: 96 % | HEART RATE: 76 BPM | HEIGHT: 65 IN

## 2018-03-22 DIAGNOSIS — M25.561 RIGHT KNEE PAIN, UNSPECIFIED CHRONICITY: ICD-10-CM

## 2018-03-22 DIAGNOSIS — I16.0 HYPERTENSIVE URGENCY: ICD-10-CM

## 2018-03-22 DIAGNOSIS — N18.6 ESRD (END STAGE RENAL DISEASE) ON DIALYSIS (HCC): ICD-10-CM

## 2018-03-22 DIAGNOSIS — Z99.2 ESRD (END STAGE RENAL DISEASE) ON DIALYSIS (HCC): ICD-10-CM

## 2018-03-22 DIAGNOSIS — I10 ESSENTIAL HYPERTENSION: ICD-10-CM

## 2018-03-22 PROCEDURE — 99205 OFFICE O/P NEW HI 60 MIN: CPT | Mod: GC | Performed by: INTERNAL MEDICINE

## 2018-03-22 RX ORDER — BUSPIRONE HYDROCHLORIDE 10 MG/1
10 TABLET ORAL 2 TIMES DAILY
COMMUNITY
End: 2018-05-23

## 2018-03-22 RX ORDER — METOPROLOL TARTRATE 50 MG/1
50 TABLET, FILM COATED ORAL 2 TIMES DAILY
Refills: 5 | COMMUNITY
Start: 2018-02-19 | End: 2018-05-23

## 2018-03-22 RX ORDER — ACETAMINOPHEN 500 MG
500-1000 TABLET ORAL EVERY 6 HOURS PRN
COMMUNITY
End: 2018-05-23

## 2018-03-22 ASSESSMENT — PAIN SCALES - GENERAL: PAINLEVEL: 6=MODERATE PAIN

## 2018-03-22 NOTE — PROGRESS NOTES
New Patient to Establish    Reason to establish: Acute Illness    CC: HTN, HA, knee edema.      HPI:   Mago Farfan is a 37 y.o. female with multiple problems.   She is somewhat of a poor medical historian, but her problems are listed below.       HTN-Urgency / emergency, with baseline of HTN.   Office BP today is 220/120. - repeated as 200/120.   Takes clonidine 0.3, BID;  lopressor 50, BID;  Only      She had been on hydralazine in the past,       but not for 3 months (per prior doctors advice?)  Today, she had a headache / HA when she woke-up.        mostly bilateral frontal region.       Also woke-up with blurry vision, but better now.   She has a hx of past HTN-urgency episodes requiring hospitalization  (and their notes note that she has had poor compliance in the past).   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       ESRD, on HD.  8 years on HD.  She is not sure why her kidneys failed.   Initially told it was due to lupus, but then told it wasn't.   Not able to make urine.  Normally gets HD - MWF, at [facility]  Last one was yesterday. - finished it,      but with difficulty, and told she had high BP     (but she thought it improved after dialysis)      Headache (HA):  Notes that when she cracks her neck      She later feels some pain in the lower left neck,      and then gets a stabbing pain in left lower neck.   Also notes some occipital pain on the left,      but sometimes different than the bilateral frontal headache,      that she gets with the HTN.   That was her prior / frequent headache.    However, she also gets episodes, of      Bilateral, frontal headaches.  She notes that this (frontal) headache is more     Frequent, when she has high blood pressure.       Right Knee pain  1 month of past knee pain and swelling,      She does not recall any history of trauma.  "  Feels swelling in and anterior to the knee       and occasionally above (superior) to it as well.   Pain - 5 or 6 /10, but at times 8 or 9.      Maybe a bit more on medial side.   Described as \"very strong\" but not sharp.    (can not describe in more detail)  Worse if standing after long sitting period.   Takes tylenol for this.       Uncertain of Lupus:  Has previously been told she has Lupus     But then told she didn't.  Past episodes of high ESR and CRP,     But negative ANCA, anti-DNA,    Normal C4, only slight decrease in C3.      Review of Symptoms  GEN/CONST:   Denies fever,   But notes feeling tired and cold after dialysis (especially hands and feet) - ongoing issue   H/E:     Denies eye pain.  +blurry vision, with some HA's / HTN.   ENT:   Denies sinus pain, sore throat, ear pain, difficulty hearing, or tinnitus.  CARDIO:   Denies chest pain, palpitations, orthopnea, or edema.  RESP:   Denies shortness of breath, wheezing, or coughing.  Previously had some SOB, but improved after dialysis (in past, not current issue).   GI:    Denies nausea, vomiting, diarrhea, constipation, or abdominal pain.   :   Denies hematuria,...  ESRD, with almost no urine.    HEME:  Denies easy bruising, bleeding, or problem with clots.   ALLG:  Denies asthma, or hives.    Multiple allergies (see below)  MSK:  Denies weakness, edema, joint pains or swellings, or muscle aches.   SKIN:  Denies rashes, itches, or sores.   NEURO:  Denies numbness or tingling, altered sensation, or focal weakness.    ENDO:  Denies heat or cold intolerance   PSYCH:  Denies depression, substance abuse.   Had prior problem with difficulty sleeping, started melatonin, and improved   +Anxiety, Feels like she is always under stress.         Past Medical History:   Diagnosis Date   • Dialysis patient (CMS-[facility])    • EKG abnormality     borderline prolonged QTC.   • Hypertension    • Renal disorder        Past Surgical History:   Procedure Laterality Date " "  • OTHER CARDIAC SURGERY      \"liquid drained from heart\", per pt   • PRIMARY C SECTION       (of some type)       History reviewed. No pertinent family history.  Denies family health issues.     Social History     Social History   • Marital status:      Spouse name: N/A   • Number of children: N/A   • Years of education: N/A     Occupational History   • Not on file.     Social History Main Topics   • Smoking status: Never Smoker   • Smokeless tobacco: Never Used   • Alcohol use No   • Drug use: No   • Sexual activity: Not on file     Other Topics Concern   • Not on file     Social History Narrative   • No narrative on file       Current Outpatient Prescriptions   Medication Sig Dispense Refill   • busPIRone (BUSPAR) 10 MG Tab Take 10 mg by mouth 2 times a day.     • acetaminophen (TYLENOL) 500 MG Tab Take 500-1,000 mg by mouth every 6 hours as needed for Mild Pain.     • cloNIDine (CATAPRESS) 0.3 MG Tab TAKE ONE (1) TABLET BY MOUTH TWICE DAILY. 180 Tab 3   • RENVELA 800 MG Tab TAKE 1 TABLET BY MOUTH 3 TIMES DAILY  150 Tab 11   • metoprolol (LOPRESSOR) 50 MG Tab Take 50 mg by mouth 2 Times a Day. TAKE 1 TABLET BY MOUTH TWICE A DAY  5   • aspirin EC (ECOTRIN) 81 MG Tablet Delayed Response Take 81 mg by mouth as needed (For headache).     • B Complex-C-Folic Acid (DIALYVITE 800 PO) Take 1 Tab by mouth every evening.       No current facility-administered medications for this visit.        Allergies as of 2018 - Reviewed 2018   Allergen Reaction Noted   • Iodine Rash 2017   • Labetalol Palpitations 2017   • Morphine  2015   • Pork allergy Itching 2016       Physical Exam  BP (!) 200/120   Pulse 76   Temp 36.5 °C (97.7 °F)   Resp 20   Ht 1.651 m (5' 5\")   Wt 58.6 kg (129 lb 3.2 oz)   SpO2 96%   Breastfeeding? No   BMI 21.50 kg/m²   General:  Alert and oriented, No apparent distress.  Eyes: Pupils equal and reactive. No scleral icterus.   Not able to clearly " visualize optic discs.  Throat: Clear, no erythema or exudates noted.  Neck: Supple. No lymphadenopathy noted. Thyroid not enlarged.  Lungs: Clear to auscultation bilaterally.  No wheezes or rales.  Cardiovascular: Regular rate and rhythm.  No murmurs, rubs or gallops.  Abdomen:  Soft.  Non-distended.  Non-tender.    Normal bowel sounds. No rebound or guarding noted.  Extremities:  Right knee with mild edema and slight increase in warmth.  No erythema or rash or discoloration.  Knee ligaments felt intact.  No obvious grinding or crepitus with motion  Negative Shahnaz's test.    Mild edema anterior to the knee and slightly superior.  No distal edema.  Negative Homans.  Good general motion of all 4 extremities.    No significant amount pain with active or passive ROM.   Neurological:  Motor function, sensation, and DTR grossly intact and symmetrical.   Psychological: Appears to have normal mood and affect.      Assessment & Plan    1. Hypertensive urgency / emergency, with  2. Essential hypertension (as prior baseline)  Recurrent episodes of hypertensive urgency/emergency.  Last time, she was hospitalized for about 4 days,      in order to control blood pressure.  Today, it was 220/120, repeated 200/120.   She stated that she had only been on metoprolol 50, twice a day,      rather than 100 mg, twice a day.     (In addition to clonidine 0.3, twice a day).  And, at that time, she refused to go to the ER.   Patient was given another dose of Lopressor 50 mg (once).  Her blood pressure was then rechecked at 1 hour and 2 hours.     Initially her BP improved to 180/100 (and headache resolved)        however, it then went up to 200/140, (with headache return),        even with the higher dose of medication.   At that time, she was then willing to go to the ER, for further treatment.     She was then sent to the ER      (located in the attached / near-by building).   - CBC WITH DIFFERENTIAL; Future  - COMP METABOLIC PANEL;  Future      3. ESRD (end stage renal disease) on dialysis (CMS-[facility])  Patient has been on dialysis for 8 years, and follows with Dr. Anderson,     Gets HD on MWF. - Last dialysis was yesterday.  She notes chronically difficult blood pressure to control.     (As noted above)  Possibly secondary to lupus, but cause unclear  Will request records from [facility]/nephrology.     (but have not received them yet)  - CBC WITH DIFFERENTIAL; Future  - COMP METABOLIC PANEL; Future      4. Right knee pain, and swelling (for 1 month).  Possible prepatellar bursitis, vs gout or RA or     Other rheumatic condition,     In patient with possible SLE.   However, given her need for further treatment for      Her hypertensive-emergency,     She is being sent to the ER for further treatment.      Prior to full work-up for this condition.       Health Maintenance  Dexa - N/A  Conoloscopy - none  Mammogram - N/A  Pap - last year (normal)   influ vaccine - a few months ago (at dialysis)   Pneumo Vaccine - likely last year as well (at dialysis)   Tetanus - uncertain (likely at HD)  Labs < 12 mo / met screen - about 7 months ago, at hospital.       Parts of this note were created with a computerized dictation system.    Despite review, there may be some spelling or grammatical errors.    Bari Mari M.D.  3/22/2018

## 2018-03-22 NOTE — LETTER
[facility]  Bari Mari M.D.  [address]  [place] [address]  Fax: [phone]   Authorization for Release/Disclosure of   Protected Health Information   Name: MARGARITA FARFAN : 1980 SSN: [id number]   Address: [address] [facility] [address]  [place] [address] Phone:    [phone] (home)    I authorize the entity listed below to release/disclose the PHI below to:   [facility]/Bari Mari M.D. and Bari Mari M.D.   Provider or Entity Name:   [facility]   Address   City, State, Zip   Phone:      Fax:     Reason for request: continuity of care   Information to be released:    [  ] LAST COLONOSCOPY,  including any PATH REPORT and follow-up  [  ] LAST FIT/COLOGUARD RESULT [  ] LAST DEXA  [  ] LAST MAMMOGRAM  [  ] LAST PAP  [  ] LAST LABS [  ] RETINA EXAM REPORT  [  ] IMMUNIZATION RECORDS  [X] Release all info      [  ] Check here and initial the line next to each item to release ALL health information INCLUDING  _____ Care and treatment for drug and / or alcohol abuse  _____ HIV testing, infection status, or AIDS  _____ Genetic Testing    DATES OF SERVICE OR TIME PERIOD TO BE DISCLOSED: _____________  I understand and acknowledge that:  * This Authorization may be revoked at any time by you in writing, except if your health information has already been used or disclosed.  * Your health information that will be used or disclosed as a result of you signing this authorization could be re-disclosed by the recipient. If this occurs, your re-disclosed health information may no longer be protected by State or Federal laws.  * You may refuse to sign this Authorization. Your refusal will not affect your ability to obtain treatment.  * This Authorization becomes effective upon signing and will  on (date) __________.      If no date is indicated, this Authorization will  one (1) year from the signature date.    Name: Margarita Farfan    Signature:   Date:          3/22/2018       PLEASE FAX REQUESTED RECORDS BACK TO: [phone]

## 2018-05-23 ENCOUNTER — APPOINTMENT (OUTPATIENT)
Dept: RADIOLOGY | Facility: MEDICAL CENTER | Age: 38
End: 2018-05-23
Attending: EMERGENCY MEDICINE
Payer: MEDICAID

## 2018-05-23 ENCOUNTER — HOSPITAL ENCOUNTER (OUTPATIENT)
Facility: MEDICAL CENTER | Age: 38
End: 2018-05-24
Attending: EMERGENCY MEDICINE | Admitting: HOSPITALIST
Payer: MEDICAID

## 2018-05-23 DIAGNOSIS — R07.9 CHEST PAIN, UNSPECIFIED TYPE: ICD-10-CM

## 2018-05-23 PROBLEM — E87.6 HYPOKALEMIA: Status: ACTIVE | Noted: 2018-05-23

## 2018-05-23 PROBLEM — I51.89 DIASTOLIC DYSFUNCTION: Status: ACTIVE | Noted: 2018-05-23

## 2018-05-23 PROBLEM — I50.32 CHRONIC DIASTOLIC CONGESTIVE HEART FAILURE (HCC): Status: ACTIVE | Noted: 2018-05-23

## 2018-05-23 LAB
ALBUMIN SERPL BCP-MCNC: 4.9 G/DL (ref 3.2–4.9)
ALBUMIN/GLOB SERPL: 1 G/DL
ALP SERPL-CCNC: 125 U/L (ref 30–99)
ALT SERPL-CCNC: 18 U/L (ref 2–50)
ANION GAP SERPL CALC-SCNC: 16 MMOL/L (ref 0–11.9)
APTT PPP: 35.6 SEC (ref 24.7–36)
AST SERPL-CCNC: 21 U/L (ref 12–45)
BASOPHILS # BLD AUTO: 0.4 % (ref 0–1.8)
BASOPHILS # BLD: 0.02 K/UL (ref 0–0.12)
BILIRUB SERPL-MCNC: 0.7 MG/DL (ref 0.1–1.5)
BNP SERPL-MCNC: 39 PG/ML (ref 0–100)
BUN SERPL-MCNC: 39 MG/DL (ref 8–22)
CALCIUM SERPL-MCNC: 10 MG/DL (ref 8.4–10.2)
CHLORIDE SERPL-SCNC: 90 MMOL/L (ref 96–112)
CO2 SERPL-SCNC: 28 MMOL/L (ref 20–33)
CREAT SERPL-MCNC: 5.16 MG/DL (ref 0.5–1.4)
EKG IMPRESSION: NORMAL
EOSINOPHIL # BLD AUTO: 0.26 K/UL (ref 0–0.51)
EOSINOPHIL NFR BLD: 5.6 % (ref 0–6.9)
ERYTHROCYTE [DISTWIDTH] IN BLOOD BY AUTOMATED COUNT: 46.5 FL (ref 35.9–50)
EST. AVERAGE GLUCOSE BLD GHB EST-MCNC: 108 MG/DL
GLOBULIN SER CALC-MCNC: 4.7 G/DL (ref 1.9–3.5)
GLUCOSE SERPL-MCNC: 103 MG/DL (ref 65–99)
HBA1C MFR BLD: 5.4 % (ref 0–5.6)
HCT VFR BLD AUTO: 45.5 % (ref 37–47)
HGB BLD-MCNC: 15.1 G/DL (ref 12–16)
IMM GRANULOCYTES # BLD AUTO: 0.01 K/UL (ref 0–0.11)
IMM GRANULOCYTES NFR BLD AUTO: 0.2 % (ref 0–0.9)
INR PPP: 1.08 (ref 0.87–1.13)
LIPASE SERPL-CCNC: 63 U/L (ref 7–58)
LYMPHOCYTES # BLD AUTO: 0.92 K/UL (ref 1–4.8)
LYMPHOCYTES NFR BLD: 19.7 % (ref 22–41)
MCH RBC QN AUTO: 28.8 PG (ref 27–33)
MCHC RBC AUTO-ENTMCNC: 33.2 G/DL (ref 33.6–35)
MCV RBC AUTO: 86.8 FL (ref 81.4–97.8)
MONOCYTES # BLD AUTO: 0.4 K/UL (ref 0–0.85)
MONOCYTES NFR BLD AUTO: 8.6 % (ref 0–13.4)
NEUTROPHILS # BLD AUTO: 3.05 K/UL (ref 2–7.15)
NEUTROPHILS NFR BLD: 65.5 % (ref 44–72)
NRBC # BLD AUTO: 0 K/UL
NRBC BLD-RTO: 0 /100 WBC
PLATELET # BLD AUTO: 227 K/UL (ref 164–446)
PMV BLD AUTO: 10.1 FL (ref 9–12.9)
POTASSIUM SERPL-SCNC: 3.3 MMOL/L (ref 3.6–5.5)
PROT SERPL-MCNC: 9.6 G/DL (ref 6–8.2)
PROTHROMBIN TIME: 13.9 SEC (ref 12–14.6)
RBC # BLD AUTO: 5.24 M/UL (ref 4.2–5.4)
SODIUM SERPL-SCNC: 134 MMOL/L (ref 135–145)
TROPONIN I SERPL-MCNC: <0.02 NG/ML (ref 0–0.04)
TROPONIN I SERPL-MCNC: <0.02 NG/ML (ref 0–0.04)
WBC # BLD AUTO: 4.7 K/UL (ref 4.8–10.8)

## 2018-05-23 PROCEDURE — 94760 N-INVAS EAR/PLS OXIMETRY 1: CPT

## 2018-05-23 PROCEDURE — 85610 PROTHROMBIN TIME: CPT

## 2018-05-23 PROCEDURE — 96374 THER/PROPH/DIAG INJ IV PUSH: CPT

## 2018-05-23 PROCEDURE — 700102 HCHG RX REV CODE 250 W/ 637 OVERRIDE(OP): Performed by: EMERGENCY MEDICINE

## 2018-05-23 PROCEDURE — 304561 HCHG STAT O2

## 2018-05-23 PROCEDURE — A9270 NON-COVERED ITEM OR SERVICE: HCPCS | Performed by: EMERGENCY MEDICINE

## 2018-05-23 PROCEDURE — 83690 ASSAY OF LIPASE: CPT

## 2018-05-23 PROCEDURE — 96372 THER/PROPH/DIAG INJ SC/IM: CPT

## 2018-05-23 PROCEDURE — 93005 ELECTROCARDIOGRAM TRACING: CPT | Performed by: HOSPITALIST

## 2018-05-23 PROCEDURE — A9270 NON-COVERED ITEM OR SERVICE: HCPCS | Performed by: HOSPITALIST

## 2018-05-23 PROCEDURE — 36415 COLL VENOUS BLD VENIPUNCTURE: CPT

## 2018-05-23 PROCEDURE — 93005 ELECTROCARDIOGRAM TRACING: CPT | Performed by: EMERGENCY MEDICINE

## 2018-05-23 PROCEDURE — 99285 EMERGENCY DEPT VISIT HI MDM: CPT

## 2018-05-23 PROCEDURE — 700102 HCHG RX REV CODE 250 W/ 637 OVERRIDE(OP): Performed by: HOSPITALIST

## 2018-05-23 PROCEDURE — 99220 PR INITIAL OBSERVATION CARE,LEVL III: CPT | Performed by: HOSPITALIST

## 2018-05-23 PROCEDURE — G0378 HOSPITAL OBSERVATION PER HR: HCPCS

## 2018-05-23 PROCEDURE — 83036 HEMOGLOBIN GLYCOSYLATED A1C: CPT

## 2018-05-23 PROCEDURE — 84484 ASSAY OF TROPONIN QUANT: CPT | Mod: 91

## 2018-05-23 PROCEDURE — 93010 ELECTROCARDIOGRAM REPORT: CPT | Performed by: INTERNAL MEDICINE

## 2018-05-23 PROCEDURE — 83880 ASSAY OF NATRIURETIC PEPTIDE: CPT

## 2018-05-23 PROCEDURE — 700111 HCHG RX REV CODE 636 W/ 250 OVERRIDE (IP): Performed by: HOSPITALIST

## 2018-05-23 PROCEDURE — 85730 THROMBOPLASTIN TIME PARTIAL: CPT

## 2018-05-23 PROCEDURE — 71045 X-RAY EXAM CHEST 1 VIEW: CPT

## 2018-05-23 PROCEDURE — 85025 COMPLETE CBC W/AUTO DIFF WBC: CPT

## 2018-05-23 PROCEDURE — 80053 COMPREHEN METABOLIC PANEL: CPT

## 2018-05-23 PROCEDURE — 700111 HCHG RX REV CODE 636 W/ 250 OVERRIDE (IP): Performed by: EMERGENCY MEDICINE

## 2018-05-23 RX ORDER — LANTHANUM CARBONATE 500 MG/1
500 TABLET, CHEWABLE ORAL
Status: DISCONTINUED | OUTPATIENT
Start: 2018-05-23 | End: 2018-05-24 | Stop reason: HOSPADM

## 2018-05-23 RX ORDER — ASPIRIN 600 MG/1
300 SUPPOSITORY RECTAL ONCE
Status: COMPLETED | OUTPATIENT
Start: 2018-05-23 | End: 2018-05-23

## 2018-05-23 RX ORDER — ASPIRIN 325 MG
325 TABLET ORAL DAILY
Status: DISCONTINUED | OUTPATIENT
Start: 2018-05-24 | End: 2018-05-23

## 2018-05-23 RX ORDER — LORAZEPAM 2 MG/ML
1 INJECTION INTRAMUSCULAR ONCE
Status: COMPLETED | OUTPATIENT
Start: 2018-05-23 | End: 2018-05-23

## 2018-05-23 RX ORDER — METOPROLOL TARTRATE 100 MG/1
100 TABLET ORAL 2 TIMES DAILY
COMMUNITY
End: 2020-02-18

## 2018-05-23 RX ORDER — POLYETHYLENE GLYCOL 3350 17 G/17G
1 POWDER, FOR SOLUTION ORAL
Status: DISCONTINUED | OUTPATIENT
Start: 2018-05-23 | End: 2018-05-24 | Stop reason: HOSPADM

## 2018-05-23 RX ORDER — OMEPRAZOLE 20 MG/1
20 CAPSULE, DELAYED RELEASE ORAL DAILY
COMMUNITY
End: 2020-02-18

## 2018-05-23 RX ORDER — METOPROLOL TARTRATE 50 MG/1
100 TABLET, FILM COATED ORAL 2 TIMES DAILY
Status: DISCONTINUED | OUTPATIENT
Start: 2018-05-23 | End: 2018-05-24 | Stop reason: HOSPADM

## 2018-05-23 RX ORDER — HEPARIN SODIUM 5000 [USP'U]/ML
5000 INJECTION, SOLUTION INTRAVENOUS; SUBCUTANEOUS EVERY 12 HOURS
Status: DISCONTINUED | OUTPATIENT
Start: 2018-05-23 | End: 2018-05-24 | Stop reason: HOSPADM

## 2018-05-23 RX ORDER — ASPIRIN 81 MG/1
324 TABLET, CHEWABLE ORAL DAILY
Status: DISCONTINUED | OUTPATIENT
Start: 2018-05-24 | End: 2018-05-23

## 2018-05-23 RX ORDER — ASPIRIN 81 MG/1
324 TABLET, CHEWABLE ORAL ONCE
Status: COMPLETED | OUTPATIENT
Start: 2018-05-23 | End: 2018-05-23

## 2018-05-23 RX ORDER — PROMETHAZINE HYDROCHLORIDE 25 MG/1
12.5-25 SUPPOSITORY RECTAL EVERY 4 HOURS PRN
Status: DISCONTINUED | OUTPATIENT
Start: 2018-05-23 | End: 2018-05-24 | Stop reason: HOSPADM

## 2018-05-23 RX ORDER — ASPIRIN 600 MG/1
300 SUPPOSITORY RECTAL DAILY
Status: DISCONTINUED | OUTPATIENT
Start: 2018-05-24 | End: 2018-05-23

## 2018-05-23 RX ORDER — LABETALOL HYDROCHLORIDE 5 MG/ML
10 INJECTION, SOLUTION INTRAVENOUS EVERY 4 HOURS PRN
Status: DISCONTINUED | OUTPATIENT
Start: 2018-05-23 | End: 2018-05-23

## 2018-05-23 RX ORDER — BISACODYL 10 MG
10 SUPPOSITORY, RECTAL RECTAL
Status: DISCONTINUED | OUTPATIENT
Start: 2018-05-23 | End: 2018-05-24 | Stop reason: HOSPADM

## 2018-05-23 RX ORDER — AMOXICILLIN 250 MG
2 CAPSULE ORAL 2 TIMES DAILY
Status: DISCONTINUED | OUTPATIENT
Start: 2018-05-23 | End: 2018-05-24 | Stop reason: HOSPADM

## 2018-05-23 RX ORDER — ONDANSETRON 2 MG/ML
4 INJECTION INTRAMUSCULAR; INTRAVENOUS EVERY 4 HOURS PRN
Status: DISCONTINUED | OUTPATIENT
Start: 2018-05-23 | End: 2018-05-24 | Stop reason: HOSPADM

## 2018-05-23 RX ORDER — CLONIDINE HYDROCHLORIDE 0.1 MG/1
0.3 TABLET ORAL TWICE DAILY
Status: DISCONTINUED | OUTPATIENT
Start: 2018-05-23 | End: 2018-05-24 | Stop reason: HOSPADM

## 2018-05-23 RX ORDER — LANTHANUM CARBONATE 500 MG/1
500 TABLET, CHEWABLE ORAL
COMMUNITY
End: 2020-02-18

## 2018-05-23 RX ORDER — PROMETHAZINE HYDROCHLORIDE 25 MG/1
12.5-25 TABLET ORAL EVERY 4 HOURS PRN
Status: DISCONTINUED | OUTPATIENT
Start: 2018-05-23 | End: 2018-05-24 | Stop reason: HOSPADM

## 2018-05-23 RX ORDER — SEVELAMER CARBONATE 800 MG/1
800 TABLET, FILM COATED ORAL
Status: DISCONTINUED | OUTPATIENT
Start: 2018-05-23 | End: 2018-05-23

## 2018-05-23 RX ORDER — OMEPRAZOLE 20 MG/1
20 CAPSULE, DELAYED RELEASE ORAL DAILY
Status: DISCONTINUED | OUTPATIENT
Start: 2018-05-23 | End: 2018-05-24 | Stop reason: HOSPADM

## 2018-05-23 RX ORDER — ATORVASTATIN CALCIUM 40 MG/1
40 TABLET, FILM COATED ORAL
Status: DISCONTINUED | OUTPATIENT
Start: 2018-05-23 | End: 2018-05-24 | Stop reason: HOSPADM

## 2018-05-23 RX ORDER — ACETAMINOPHEN 325 MG/1
650 TABLET ORAL EVERY 6 HOURS PRN
Status: DISCONTINUED | OUTPATIENT
Start: 2018-05-23 | End: 2018-05-24 | Stop reason: HOSPADM

## 2018-05-23 RX ORDER — CALCIUM CARBONATE 750 MG/1
1 TABLET, CHEWABLE ORAL PRN
COMMUNITY
End: 2020-02-18

## 2018-05-23 RX ORDER — ONDANSETRON 4 MG/1
4 TABLET, ORALLY DISINTEGRATING ORAL EVERY 4 HOURS PRN
Status: DISCONTINUED | OUTPATIENT
Start: 2018-05-23 | End: 2018-05-24 | Stop reason: HOSPADM

## 2018-05-23 RX ADMIN — OMEPRAZOLE 20 MG: 20 CAPSULE, DELAYED RELEASE ORAL at 18:24

## 2018-05-23 RX ADMIN — ATORVASTATIN CALCIUM 40 MG: 40 TABLET, FILM COATED ORAL at 20:05

## 2018-05-23 RX ADMIN — LORAZEPAM 1 MG: 2 INJECTION INTRAMUSCULAR; INTRAVENOUS at 14:31

## 2018-05-23 RX ADMIN — ASPIRIN 81 MG 324 MG: 81 TABLET ORAL at 14:31

## 2018-05-23 RX ADMIN — HEPARIN SODIUM 5000 UNITS: 5000 INJECTION, SOLUTION INTRAVENOUS; SUBCUTANEOUS at 20:05

## 2018-05-23 RX ADMIN — Medication 1 MG: at 20:05

## 2018-05-23 RX ADMIN — CLONIDINE HYDROCHLORIDE 0.3 MG: 0.1 TABLET ORAL at 20:05

## 2018-05-23 RX ADMIN — LANTHANUM CARBONATE 500 MG: 500 TABLET, CHEWABLE ORAL at 18:34

## 2018-05-23 RX ADMIN — METOPROLOL TARTRATE 100 MG: 50 TABLET ORAL at 20:05

## 2018-05-23 ASSESSMENT — COPD QUESTIONNAIRES
IN THE PAST 12 MONTHS DO YOU DO LESS THAN YOU USED TO BECAUSE OF YOUR BREATHING PROBLEMS: DISAGREE/UNSURE
DO YOU EVER COUGH UP ANY MUCUS OR PHLEGM?: NO/ONLY WITH OCCASIONAL COLDS OR INFECTIONS
HAVE YOU SMOKED AT LEAST 100 CIGARETTES IN YOUR ENTIRE LIFE: NO/DON'T KNOW
COPD SCREENING SCORE: 0
DURING THE PAST 4 WEEKS HOW MUCH DID YOU FEEL SHORT OF BREATH: NONE/LITTLE OF THE TIME
DURING THE PAST 4 WEEKS HOW MUCH DID YOU FEEL SHORT OF BREATH: NONE/LITTLE OF THE TIME
COPD SCREENING SCORE: 0
HAVE YOU SMOKED AT LEAST 100 CIGARETTES IN YOUR ENTIRE LIFE: NO/DON'T KNOW
DO YOU EVER COUGH UP ANY MUCUS OR PHLEGM?: NO/ONLY WITH OCCASIONAL COLDS OR INFECTIONS

## 2018-05-23 ASSESSMENT — ENCOUNTER SYMPTOMS
DIZZINESS: 1
TREMORS: 1
SHORTNESS OF BREATH: 0
CHILLS: 1
NAUSEA: 0
WEAKNESS: 1
FALLS: 0
FEVER: 0
PALPITATIONS: 0
LOSS OF CONSCIOUSNESS: 0
ABDOMINAL PAIN: 0
COUGH: 0
PSYCHIATRIC NEGATIVE: 1
BLURRED VISION: 0
VOMITING: 0
SEIZURES: 0
DIAPHORESIS: 0
HEADACHES: 0

## 2018-05-23 ASSESSMENT — PAIN SCALES - GENERAL
PAINLEVEL_OUTOF10: 0
PAINLEVEL_OUTOF10: 0
PAINLEVEL_OUTOF10: 6

## 2018-05-23 ASSESSMENT — PATIENT HEALTH QUESTIONNAIRE - PHQ9
SUM OF ALL RESPONSES TO PHQ9 QUESTIONS 1 AND 2: 0
1. LITTLE INTEREST OR PLEASURE IN DOING THINGS: NOT AT ALL
2. FEELING DOWN, DEPRESSED, IRRITABLE, OR HOPELESS: NOT AT ALL

## 2018-05-23 ASSESSMENT — LIFESTYLE VARIABLES
EVER_SMOKED: NEVER
EVER_SMOKED: NEVER
ALCOHOL_USE: NO

## 2018-05-23 NOTE — ED NOTES
Med rec complete per rx bottles at bedside and Ipad  Demetrice/[id number]  Allergies reviewed  No PO antibiotics in last 30 days

## 2018-05-23 NOTE — ED NOTES
"Chief Complaint   Patient presents with   • Chest Pain   • Shortness of Breath     BP (!) 213/123   Pulse 87   Temp 36.7 °C (98.1 °F)   Resp (!) 24   Ht 1.651 m (5' 5\")   Wt 58.4 kg (128 lb 12 oz)   SpO2 100%   BMI 21.42 kg/m²     Started while in dialysis this afternoon. Made it through 2.5 of 3 hour treatment   "

## 2018-05-23 NOTE — H&P
" Hospital Medicine History and Physical    Date of Service  2018    Chief Complaint  Chief Complaint   Patient presents with   • Chest Pain   • Shortness of Breath       History of Presenting Illness  37 y.o. female who presented 2018 with chest pain that started while she was undergoing HD. She was about 2.5 hours into her session when she developed a left lower chest discomfort that then migrated to her substernal chest. It was described as a pressure like discomfort that lasted for > 30 minutes. It was associated with dizziness, chills, dry mouth and feeling tremulous. She denies feeling symptoms like this previously. There were no alleviating or aggravating factors, she was not active at the time. Symptoms resolved while in the ER, after a dose of ASA.     Primary Care Physician  Bari Mari M.D.    Consultants  None    Code Status  Full    Review of Systems  Review of Systems   Constitutional: Positive for chills and malaise/fatigue. Negative for diaphoresis and fever.   Eyes: Negative for blurred vision.   Respiratory: Negative for cough and shortness of breath.    Cardiovascular: Positive for chest pain. Negative for palpitations and leg swelling.   Gastrointestinal: Negative for abdominal pain, nausea and vomiting.   Genitourinary: Negative for dysuria.   Musculoskeletal: Negative for falls.   Neurological: Positive for dizziness, tremors and weakness. Negative for seizures, loss of consciousness and headaches.   Psychiatric/Behavioral: Negative.    All other systems reviewed and are negative.       Past Medical History  Past Medical History:   Diagnosis Date   • Dialysis patient (HCC)    • EKG abnormality     borderline prolonged QTC.   • Hypertension    • Renal disorder        Surgical History  Past Surgical History:   Procedure Laterality Date   • OTHER CARDIAC SURGERY      \"liquid drained from heart\", per pt   • PRIMARY C SECTION       (of some type)       Medications  No " current facility-administered medications on file prior to encounter.      Current Outpatient Prescriptions on File Prior to Encounter   Medication Sig Dispense Refill   • cloNIDine (CATAPRESS) 0.3 MG Tab TAKE ONE (1) TABLET BY MOUTH TWICE DAILY. 180 Tab 3   • RENVELA 800 MG Tab TAKE 1 TABLET BY MOUTH 3 TIMES DAILY  150 Tab 11   • B Complex-C-Folic Acid (DIALYVITE 800 PO) Take 1 Tab by mouth every evening.         Family History  History reviewed. No pertinent family history.  No known family h/o heart disease.    Social History  Social History   Substance Use Topics   • Smoking status: Never Smoker   • Smokeless tobacco: Never Used   • Alcohol use No   Lives with  and son. Denies tobacco, illicit drug use or alcohol.     Allergies  Allergies   Allergen Reactions   • Iodine Rash     Rash   • Labetalol Palpitations     Fast heart rate, rash, HTN   • Morphine      Pt states that she can tolerate small dose of morphine (can tolerate up to 2mg dosage).   • Pork Allergy Itching     Itchy skin        Physical Exam  Laboratory   Hemodynamics  Temp (24hrs), Av.7 °C (98 °F), Min:36.6 °C (97.9 °F), Max:36.7 °C (98.1 °F)   Temperature: 36.6 °C (97.9 °F)  Pulse  Av.9  Min: 69  Max: 87 Heart Rate (Monitored): 70  Blood Pressure: 139/88, NIBP: 157/107      Respiratory      Respiration: 18, Pulse Oximetry: 96 %, O2 Daily Delivery Respiratory : Room Air with O2 Available        RUL Breath Sounds: Clear;Diminished, RML Breath Sounds: Clear;Diminished, RLL Breath Sounds: Diminished, YOUNG Breath Sounds: Clear;Diminished, LLL Breath Sounds: Diminished    Physical Exam   Constitutional: She is oriented to person, place, and time. She appears well-developed. No distress.   HENT:   Head: Normocephalic.   Mouth/Throat: Oropharynx is clear and moist.   Eyes: EOM are normal. No scleral icterus.   Neck: Normal range of motion. Neck supple. No tracheal deviation present.   Cardiovascular: Normal rate, regular rhythm and intact  distal pulses.    Pulmonary/Chest: Effort normal and breath sounds normal. No respiratory distress. She has no rales.   Abdominal: Soft. Bowel sounds are normal. She exhibits no distension. There is no tenderness.   Musculoskeletal: She exhibits no edema.   Neurological: She is alert and oriented to person, place, and time.   Skin: Skin is warm and dry.   Left forearm AV fistula with good thrill   Psychiatric: She has a normal mood and affect. Her behavior is normal.   Vitals reviewed.      Recent Labs      05/23/18   1358   WBC  4.7*   RBC  5.24   HEMOGLOBIN  15.1   HEMATOCRIT  45.5   MCV  86.8   MCH  28.8   MCHC  33.2*   RDW  46.5   PLATELETCT  227   MPV  10.1     Recent Labs      05/23/18   1358   SODIUM  134*   POTASSIUM  3.3*   CHLORIDE  90*   CO2  28   GLUCOSE  103*   BUN  39*   CREATININE  5.16*   CALCIUM  10.0     Recent Labs      05/23/18   1358   ALTSGPT  18   ASTSGOT  21   ALKPHOSPHAT  125*   TBILIRUBIN  0.7   LIPASE  63*   GLUCOSE  103*     Recent Labs      05/23/18   1358   APTT  35.6   INR  1.08     Recent Labs      05/23/18   1358   BNPBTYPENAT  39         Lab Results   Component Value Date    TROPONINI <0.02 05/23/2018     Urinalysis:    Lab Results  Component Value Date/Time   SPECGRAVITY 1.009 01/14/2015 0050   GLUCOSEUR 100 (A) 01/14/2015 0050   KETONES Negative 01/14/2015 0050   NITRITE Negative 01/14/2015 0050   WBCURINE 2-5 01/14/2015 0050   RBCURINE 0-2 01/14/2015 0050   BACTERIA Few (A) 01/14/2015 0050   EPITHELCELL Many (A) 01/14/2015 0050        Imaging  DX-CHEST-PORTABLE (1 VIEW)   Final Result      No evidence of acute cardiopulmonary process.      NM-CARDIAC STRESS TEST    (Results Pending)        Assessment/Plan     I anticipate this patient is appropriate for observation status at this time.    * Chest pain- (present on admission)   Assessment & Plan    Component of typical and atypical chest pain during HD today. No family history of premature HD. Is at risk for CAD given that she is  a vasculopath with ESRD. First troponin negative. Will observe overnight for further risk stratification.   - repeat troponin x2  - stress test pending  - monitor on tele overnight  - A1C and lipid panel pending        ESRD (end stage renal disease) on dialysis (HCC)- (present on admission)   Assessment & Plan    ESRD on HD-MWF. Nearly completed her HD session today prior to presentation, improvement in labs. No need for emergent HD.  - will notify nephrology in the AM  - renal diet  - continue phosphate binder        Hypokalemia- (present on admission)   Assessment & Plan    K at 3.3 in this patient with ESRD.   - no repletion at this time  - repeat in the AM        Chronic diastolic congestive heart failure (HCC)- (present on admission)   Assessment & Plan    Patient with h/o combined systolic and diastolic HF with her most recent echo showing resolution of her systolic heart failure. Not an acute exacerbation but at risk for fluid overload.  - continue medical management        Essential hypertension- (present on admission)   Assessment & Plan    Hypertensive on admission. Greatly improved now.   - resume clonidine and metoprolol        Leukopenia- (present on admission)   Assessment & Plan    Chronic leukopenia. Has been noted in PCP notes, etiology unknown.  - monitor clinically            VTE prophylaxis: heparin.

## 2018-05-23 NOTE — ED PROVIDER NOTES
"ED Provider Note  CHIEF COMPLAINT  No chief complaint on file.      HPI  Mago Farfan is a 37 y.o. female who presents with history of chest pain while she was receiving dialysis today. Chest pain for about an hour. Pain moderate dull intermittent. Did have shortness of breath also with the chest pain and lightheadedness. She has a history of renal failure, was on dialysis today and had about 2-1/2 hours out of the three-hour ride diet. No other abnormalities. No nausea no vomiting no diaphoresis. Does not get chest pain with exercise. His never had chest pain with dialysis in the past. Pain was in her mid chest nonpleuritic.    REVIEW OF SYSTEMS  See HPI for further details. History of renal failure on dialysis history of hypertension Denies other G.I., G.U.. endrocine, cardiovascular, respriatory or neurological problems. All other systems are negative.     PAST MEDICAL HISTORY  Past Medical History:   Diagnosis Date   • Dialysis patient (CMS-[facility]) ([facility])    • EKG abnormality     borderline prolonged QTC.   • Hypertension    • Renal disorder        FAMILY HISTORY  No family history on file.    SOCIAL HISTORY she is a nonsmoker  Social History     Social History   • Marital status:      Spouse name: N/A   • Number of children: N/A   • Years of education: N/A     Social History Main Topics   • Smoking status: Never Smoker   • Smokeless tobacco: Never Used   • Alcohol use No   • Drug use: No   • Sexual activity: Not on file     Other Topics Concern   • Not on file     Social History Narrative   • No narrative on file       SURGICAL HISTORY  Past Surgical History:   Procedure Laterality Date   • OTHER CARDIAC SURGERY      \"liquid drained from heart\", per pt   • PRIMARY C SECTION       (of some type)       CURRENT MEDICATIONS  Home Medications    **Home medications have not yet been reviewed for this encounter**         ALLERGIES  Allergies   Allergen Reactions   • Iodine Rash     Rash   • " "Labetalol Palpitations     Fast heart rate, rash, HTN   • Morphine      Pt states that she can tolerate small dose of morphine (can tolerate up to 2mg dosage).   • Pork Allergy Itching     Itchy skin       PHYSICAL EXAM  VITAL SIGNS: BP (!) 213/123   Pulse 87   Temp 36.7 °C (98.1 °F)   Resp (!) 24   Ht 1.651 m (5' 5\")   Wt 58.4 kg (128 lb 12 oz)   SpO2 100%   BMI 21.42 kg/m²    Constitutional: Well developed, Well nourished, No acute distress, Non-toxic appearance.   HENT: Normocephalic, Atraumatic, Bilateral external ears normal, Oropharynx moist, No oral exudates, Nose normal.   Eyes: PERRL, EOMI, Conjunctiva normal, No discharge.   Neck: Normal range of motion, No tenderness, Supple, No stridor.   Lymphatic: No lymphadenopathy noted.   Cardiovascular: Normal heart rate, Normal rhythm, No murmurs, No rubs, No gallops.   Thorax & Lungs: Normal breath sounds, No respiratory distress, No wheezing, No chest tenderness.   Abdomen:  No tenderness, no guarding no rigidity and the abdomen is soft.  No masses, No pulsatile masses.  Skin: Warm, Dry, No erythema, No rash.   Back: No tenderness, No CVA tenderness.   Extremities: Intact distal pulses, No edema, No tenderness, No cyanosis, No clubbing.   Musculoskeletal: Good range of motion in all major joints. No tenderness to palpation or major deformities noted.   Neurologic: Alert & oriented x 3, Normal motor function, Normal sensory function, No focal deficits noted.   Psychiatric: Affect normal, Judgment normal, Mood normal.   EKG Interpretation    Interpreted by me    Rhythm: normal sinus   Rate: normal  Axis: normal  Ectopy: none  Conduction: normal  ST Segments: ST wave depression in the lateral leads, T-wave inversion in the lateral leads all present in previous EKG. Q Waves: none  Left ventricular hypertrophy  Clinical Impression: I do have an old EKG to compare to and I do not see significant change.    RADIOLOGY/PROCEDURES  DX-CHEST-PORTABLE (1 VIEW) "   Final Result      No evidence of acute cardiopulmonary process.            COURSE & MEDICAL DECISION MAKING  Pertinent Labs & Imaging studies reviewed. (See chart for details) white count normal hematocrit and normal platelet count normal there is no shift. Electrolytes, unremarkable, BUN and creatinine, both elevated., Lipase normal, clotting studies normal    She had an episode of chest pain for an hour or so while she was receiving dialysis today. His high risk for heart disease. I will talk with the hospitalist about admission. She is given aspirin here in the emergency room. Her troponin is normal however she does have several risk factors for cardiovascular disease. I will talk with the hospitalist about admission  FINAL IMPRESSION  1.   1. Chest pain, unspecified type        2.   3.     Disposition  I have talked with hospitalist about admission. Currently she is chest pain-free  Electronically signed by: Anton Olvera, 5/23/2018 1:54 PM

## 2018-05-24 ENCOUNTER — APPOINTMENT (OUTPATIENT)
Dept: RADIOLOGY | Facility: MEDICAL CENTER | Age: 38
End: 2018-05-24
Attending: HOSPITALIST
Payer: MEDICAID

## 2018-05-24 ENCOUNTER — PATIENT OUTREACH (OUTPATIENT)
Dept: HEALTH INFORMATION MANAGEMENT | Facility: OTHER | Age: 38
End: 2018-05-24

## 2018-05-24 VITALS
TEMPERATURE: 97.4 F | RESPIRATION RATE: 16 BRPM | SYSTOLIC BLOOD PRESSURE: 136 MMHG | BODY MASS INDEX: 21.45 KG/M2 | HEART RATE: 60 BPM | OXYGEN SATURATION: 96 % | DIASTOLIC BLOOD PRESSURE: 83 MMHG | HEIGHT: 65 IN | WEIGHT: 128.75 LBS

## 2018-05-24 LAB
ALBUMIN SERPL BCP-MCNC: 3.9 G/DL (ref 3.2–4.9)
ALBUMIN/GLOB SERPL: 1.1 G/DL
ALP SERPL-CCNC: 97 U/L (ref 30–99)
ALT SERPL-CCNC: 15 U/L (ref 2–50)
ANION GAP SERPL CALC-SCNC: 14 MMOL/L (ref 0–11.9)
AST SERPL-CCNC: 19 U/L (ref 12–45)
BILIRUB SERPL-MCNC: 0.7 MG/DL (ref 0.1–1.5)
BUN SERPL-MCNC: 59 MG/DL (ref 8–22)
CALCIUM SERPL-MCNC: 8.7 MG/DL (ref 8.4–10.2)
CHLORIDE SERPL-SCNC: 92 MMOL/L (ref 96–112)
CHOLEST SERPL-MCNC: 135 MG/DL (ref 100–199)
CO2 SERPL-SCNC: 29 MMOL/L (ref 20–33)
CREAT SERPL-MCNC: 8.39 MG/DL (ref 0.5–1.4)
EKG IMPRESSION: NORMAL
ERYTHROCYTE [DISTWIDTH] IN BLOOD BY AUTOMATED COUNT: 48.4 FL (ref 35.9–50)
GLOBULIN SER CALC-MCNC: 3.5 G/DL (ref 1.9–3.5)
GLUCOSE SERPL-MCNC: 83 MG/DL (ref 65–99)
HCT VFR BLD AUTO: 40.4 % (ref 37–47)
HDLC SERPL-MCNC: 46 MG/DL
HGB BLD-MCNC: 12.7 G/DL (ref 12–16)
LDLC SERPL CALC-MCNC: 76 MG/DL
MCH RBC QN AUTO: 28.3 PG (ref 27–33)
MCHC RBC AUTO-ENTMCNC: 31.4 G/DL (ref 33.6–35)
MCV RBC AUTO: 90.2 FL (ref 81.4–97.8)
PLATELET # BLD AUTO: 188 K/UL (ref 164–446)
PMV BLD AUTO: 10.4 FL (ref 9–12.9)
POTASSIUM SERPL-SCNC: 4.9 MMOL/L (ref 3.6–5.5)
PROT SERPL-MCNC: 7.4 G/DL (ref 6–8.2)
RBC # BLD AUTO: 4.48 M/UL (ref 4.2–5.4)
SODIUM SERPL-SCNC: 135 MMOL/L (ref 135–145)
TRIGL SERPL-MCNC: 63 MG/DL (ref 0–149)
WBC # BLD AUTO: 4.7 K/UL (ref 4.8–10.8)

## 2018-05-24 PROCEDURE — 700102 HCHG RX REV CODE 250 W/ 637 OVERRIDE(OP): Performed by: HOSPITALIST

## 2018-05-24 PROCEDURE — G0378 HOSPITAL OBSERVATION PER HR: HCPCS

## 2018-05-24 PROCEDURE — A9270 NON-COVERED ITEM OR SERVICE: HCPCS | Performed by: HOSPITALIST

## 2018-05-24 PROCEDURE — 80061 LIPID PANEL: CPT

## 2018-05-24 PROCEDURE — 80053 COMPREHEN METABOLIC PANEL: CPT

## 2018-05-24 PROCEDURE — 99217 PR OBSERVATION CARE DISCHARGE: CPT | Performed by: HOSPITALIST

## 2018-05-24 PROCEDURE — 85027 COMPLETE CBC AUTOMATED: CPT

## 2018-05-24 RX ADMIN — CLONIDINE HYDROCHLORIDE 0.3 MG: 0.1 TABLET ORAL at 10:01

## 2018-05-24 RX ADMIN — ASPIRIN 81 MG: 81 TABLET, COATED ORAL at 10:04

## 2018-05-24 RX ADMIN — OMEPRAZOLE 20 MG: 20 CAPSULE, DELAYED RELEASE ORAL at 10:03

## 2018-05-24 RX ADMIN — METOPROLOL TARTRATE 100 MG: 50 TABLET ORAL at 10:03

## 2018-05-24 ASSESSMENT — PATIENT HEALTH QUESTIONNAIRE - PHQ9
SUM OF ALL RESPONSES TO PHQ9 QUESTIONS 1 AND 2: 0
2. FEELING DOWN, DEPRESSED, IRRITABLE, OR HOPELESS: NOT AT ALL
1. LITTLE INTEREST OR PLEASURE IN DOING THINGS: NOT AT ALL

## 2018-05-24 ASSESSMENT — PAIN SCALES - GENERAL: PAINLEVEL_OUTOF10: 0

## 2018-05-24 NOTE — PROGRESS NOTES
"1900 Received report from day shift KELLI Bonilla. Plan of care discussed at bedside. Patient resting comfortably in bed at this time with no complaints. Safety precautions and hourly rounding in place.    1915 EKG done at bedside per protocol, family at bedside.    2000 Assessment completed and due medications given. Patient provided information in Vietnamese about medications.    2050 ER called to help locate patient's phone; patient has not had phone since arriving to room 302-2 ([facility] with black case).    2300 Christo education provided about upcoming stress test in Vietnamese. Educated appropriately. Is aware of NPO status at midnight.    2335 Patient continues to have questions about stress test, especially in regards to the radioactive isotope used. States having a history of stress tests almost \"killing\" her due to severely impacted renal fx.  services called, patient adequately educated fully in [employer] via . Will most likely refuse stress test in am.    0300 Patient sleeping soundly.    0700 Report given to Irene PEREIRA, POC discussed.    "

## 2018-05-24 NOTE — ASSESSMENT & PLAN NOTE
ESRD on HD-MWF. Nearly completed her HD session today prior to presentation, improvement in labs. No need for emergent HD.  - will notify nephrology in the AM  - renal diet  - continue phosphate binder

## 2018-05-24 NOTE — PROGRESS NOTES
0815Report received, plan of care reviewed and discussed, assessment complete, oriented to room, bed alarm on, nonskid socks applied, advised to call for assistance.   1015 Discussed plan of care, encouraged and answered all questions, will continue to monitor.  1215 Reviewed discharge plans, encouraged and answered all questions, discharged to home with family.  Cardiac Summary.  Sinus rhythm/sinus venkata (0.20/0.08/0.40)

## 2018-05-24 NOTE — PROGRESS NOTES
Tele Strip at 1902 shows SR with HR of 79  Measurements: 0.16 / 0.08 / 0.36    Tele Shift Summary:  Rhythm: SR/SB  Rate: 50s-70s  Ectopy: rare PVC's    Telemetry monitoring strips placed in patient's chart.

## 2018-05-24 NOTE — DISCHARGE SUMMARY
CHIEF COMPLAINT ON ADMISSION  Chief Complaint   Patient presents with   • Chest Pain   • Shortness of Breath       CODE STATUS  Full Code    HPI & HOSPITAL COURSE  This is a 37 y.o. female here with sudden onset of chest pain and back pain during dialysis. The patient was found to be noncompliant with her medications for blood pressure and the patient's blood pressure during dialysis was in the 200 range. The patient was thus sent over to the hospital initially her blood pressure was 213/123. The patient and was given antihypertensive management her blood pressure came down 139/88 and surprisingly her chest pressure as well as back pain resolved. The patient was scheduled to have a stress test this morning with isotope however the patient refused to take any isotope. Since the patient was given beta blockers a treadmill stress this was not able to be performed. I spoke with the patient as well as her nephrologist. After discussion date was decided the patient would be best served by discharging home and she can follow-up as an outpatient with cardiology and they can decide whether a cardiac catheterization versus a stress test would be optimal for her. At this point counseling was given to her in extensive detail regarding the need for blood pressure management. She will resume her dialysis as an outpatient as indicated.    The patient recovered much more quickly than anticipated on admission.    Therefore, she is discharged in good and stable condition with close outpatient follow-up.    SPECIFIC OUTPATIENT FOLLOW-UP  Follow-up with primary care physician and nephrology in 7-10 days  Continue with dialysis as indicated    DISCHARGE PROBLEM LIST  Principal Problem:    Chest pain POA: Yes  Active Problems:    Noncompliance POA: Yes    ESRD (end stage renal disease) on dialysis (HCC) POA: Yes      Overview: HD this morning for recurrent hyperkalemia    Leukopenia POA: Yes    History of lupus nephritis POA: Yes     Essential hypertension POA: Yes    Chronic diastolic congestive heart failure (HCC) POA: Yes    Hypokalemia POA: Yes  Resolved Problems:    * No resolved hospital problems. *      FOLLOW UP  Future Appointments  Date Time Provider Department Center   6/25/2018 2:20 PM Quinton Christy M.D. [facility]     No follow-up provider specified.    MEDICATIONS ON DISCHARGE   Mago Billings   Home Medication Instructions SANJUANITA:[id number]    Printed on:05/24/18 5722   Medication Information                      B Complex-C-Folic Acid (DIALYVITE 800 PO)  Take 1 Tab by mouth every evening.             calcium carbonate (TUMS EXTRA STRENGTH 750) 750 MG chewable tablet  Take 1 Tab by mouth as needed.             cloNIDine (CATAPRESS) 0.3 MG Tab  TAKE ONE (1) TABLET BY MOUTH TWICE DAILY.             lanthanum carbonate (FOSRENOL) 500 MG Chew Tab  Take 500 mg by mouth 3 times a day, with meals.             metoprolol (LOPRESSOR) 100 MG Tab  Take 100 mg by mouth 2 times a day.             omeprazole (PRILOSEC) 20 MG delayed-release capsule  Take 20 mg by mouth every day.             RENVELA 800 MG Tab  TAKE 1 TABLET BY MOUTH 3 TIMES DAILY                  DIET  Orders Placed This Encounter   Procedures   • DIET ORDER     Standing Status:   Standing     Number of Occurrences:   1     Order Specific Question:   Diet:     Answer:   Regular [1]     Order Specific Question:   Miscellaneous modifications:     Answer:   No Decaf, No Caffeine(for test) [11]     Comments:   Protocol 1311 No caffeine for 12 hours prior to exam (decaf, coffee, cola, tea, chocolate, Excedrin, and Anacin).       ACTIVITY  As tolerated.  Weight bearing as tolerated      CONSULTATIONS  None    PROCEDURES  None    LABORATORY  Lab Results   Component Value Date/Time    SODIUM 135 05/24/2018 04:22 AM    POTASSIUM 4.9 05/24/2018 04:22 AM    CHLORIDE 92 (L) 05/24/2018 04:22 AM    CO2 29 05/24/2018 04:22 AM    GLUCOSE 83 05/24/2018 04:22 AM    BUN 59 (H)  05/24/2018 04:22 AM    CREATININE 8.39 (HH) 05/24/2018 04:22 AM    CREATININE 3.0 (H) 10/14/2008 09:29 AM        Lab Results   Component Value Date/Time    WBC 4.7 (L) 05/24/2018 04:22 AM    HEMOGLOBIN 12.7 05/24/2018 04:22 AM    HEMATOCRIT 40.4 05/24/2018 04:22 AM    PLATELETCT 188 05/24/2018 04:22 AM        Total time of the discharge process exceeds 40 minutes

## 2018-05-24 NOTE — DISCHARGE INSTRUCTIONS
Discharge Instructions    Discharged to home by car with relative. Discharged via wheelchair, hospital escort: Yes.  Special equipment needed: Not Applicable    Be sure to schedule a follow-up appointment with your primary care doctor or any specialists as instructed.     Discharge Plan:   Diet Plan: Discussed  Activity Level: Discussed  Confirmed Follow up Appointment: Patient to Call and Schedule Appointment  Confirmed Symptoms Management: Discussed  Medication Reconciliation Updated: Yes  Pneumococcal Vaccine Administered/Refused: Not given - Patient refused pneumococcal vaccine  Influenza Vaccine Indication: Patient Refuses    I understand that a diet low in cholesterol, fat, and sodium is recommended for good health. Unless I have been given specific instructions below for another diet, I accept this instruction as my diet prescription.     Special Instructions: Chest Pain, Nonspecific  It is often hard to give a specific diagnosis for the cause of chest pain. There is always a chance that your pain could be related to something serious, like a heart attack or a blood clot in the lungs. You need to follow up with your caregiver for further evaluation. More lab tests or other studies such as X-rays, electrocardiography, stress testing, or cardiac imaging may be needed to find the cause of your pain.  Most of the time, nonspecific chest pain improves within 2 to 3 days with rest and mild pain medicine. For the next few days, avoid physical exertion or activities that bring on pain. Do not smoke. Avoid drinking alcohol. Call your caregiver for routine follow-up as advised.   SEEK IMMEDIATE MEDICAL CARE IF:  · You develop increased chest pain or pain that radiates to the arm, neck, jaw, back, or abdomen.   · You develop shortness of breath, increased coughing, or you start coughing up blood.   · You have severe back or abdominal pain, nausea, or vomiting.   · You develop severe weakness, fainting, fever, or chills.    Document Released: 12/18/2006 Document Revised: 03/11/2013 Document Reviewed: 06/06/2008  [employer]® Patient Information ©2013 [employer].  Dolor en el pecho, Inespecífico  (Chest Pain, Nonspecific)  Con frecuencia es difícil tony un diagnóstico específico de la causa del dolor de pecho. Siempre hay amisha posibilidad de que sher dolor puede estar relacionado con algo grave, daja un ataque al corazón o un coágulo de tavo en los pulmones. Deberá hacer controles con sher médico para amisha evaluación adicional. Puede ser necesario realizar otros análisis de laboratorio u otros estudios tales daja radiografías, electrocardiograma, prueba de esfuerzo, o diagnóstico por imágenes para el corazón para determinar la causa de sher dolor.   La mayor parte del dolor en el pecho inespecífico mejorará en 2 ó 3 días de reposo y analgésicos suaves. Kary los próximos días evite los ejercicios físicos o las actividades que le causan dolor. No fume. Evite consumir alcohol. Comuníquese con sher médico para realizar controles según las indicaciones.   SOLICITE ATENCIÓN MÉDICA DE INMEDIATO SI:  · El dolor en el pecho aumenta o se irradia hacia el brazo, el daya, la mandíbula, la espalda o el abdomen.   · Le falta el aire, aumenta la tos o comienza a escupir tavo al toser.   · Siente un dolor intenso en la espalda, el abdomen, tiene náuseas o vómitos intensos.   · Desarrolla amisha debilidad extrema, se desmaya, tiene fiebre o escalofríos.   Document Released: 12/18/2006 Document Revised: 03/11/2013  [employer]ump[employer]® Patient Information ©2013 [employer].  · Is patient discharged on Warfarin / Coumadin?   No     Depression / Suicide Risk    As you are discharged from this [facility] facility, it is important to learn how to keep safe from harming yourself.    Recognize the warning signs:  · Abrupt changes in personality, positive or negative- including increase in energy   · Giving away possessions  · Change in eating patterns- significant  weight changes-  positive or negative  · Change in sleeping patterns- unable to sleep or sleeping all the time   · Unwillingness or inability to communicate  · Depression  · Unusual sadness, discouragement and loneliness  · Talk of wanting to die  · Neglect of personal appearance   · Rebelliousness- reckless behavior  · Withdrawal from people/activities they love  · Confusion- inability to concentrate     If you or a loved one observes any of these behaviors or has concerns about self-harm, here's what you can do:  · Talk about it- your feelings and reasons for harming yourself  · Remove any means that you might use to hurt yourself (examples: pills, rope, extension cords, firearm)  · Get professional help from the community (Mental Health, Substance Abuse, psychological counseling)  · Do not be alone:Call your Safe Contact- someone whom you trust who will be there for you.  · Call your local CRISIS HOTLINE 670-1223 or 926-945-7027  · Call your local Children's Mobile Crisis Response Team [place] (653) 020-6033 or www.Debt Resolve  · Call the toll free National Suicide Prevention Hotlines   · National Suicide Prevention Lifeline 223-198-QVZB (8204)  · National Hope Line Network 800-SUICIDE (238-3927)

## 2018-05-24 NOTE — ASSESSMENT & PLAN NOTE
Patient with h/o combined systolic and diastolic HF with her most recent echo showing resolution of her systolic heart failure. Not an acute exacerbation but at risk for fluid overload.  - continue medical management

## 2018-05-24 NOTE — CARE PLAN
Problem: Communication  Goal: The ability to communicate needs accurately and effectively will improve  Outcome: PROGRESSING AS EXPECTED    Intervention: [place] patient and significant other/support system to call light to alert staff of needs  Patient oriented to call light system and all relevant hospital policies. Call light within reach and used appropriately when in need of assistance.        Problem: Infection  Goal: Will remain free from infection  Outcome: PROGRESSING AS EXPECTED    Intervention: Implement standard precautions and perform hand washing before and after patient contact  Standard precautions and hand hygiene practices implemented before and after patient room entry. Gloves worn during times of patient contact.

## 2018-05-24 NOTE — PROGRESS NOTES
1800Report received, plan of care reviewed and discussed, assessment complete, oriented to room, bed alarm on, nonskid socks applied, advised to call for assistance.   1845 Report given to next shift.

## 2018-05-24 NOTE — ASSESSMENT & PLAN NOTE
Component of typical and atypical chest pain during HD today. No family history of premature HD. Is at risk for CAD given that she is a vasculopath with ESRD. First troponin negative. Will observe overnight for further risk stratification.   - repeat troponin x2  - stress test pending  - monitor on tele overnight  - A1C and lipid panel pending

## 2018-05-25 ENCOUNTER — PATIENT OUTREACH (OUTPATIENT)
Dept: HEALTH INFORMATION MANAGEMENT | Facility: OTHER | Age: 38
End: 2018-05-25

## 2019-12-16 ENCOUNTER — TELEPHONE (OUTPATIENT)
Dept: NEPHROLOGY | Facility: MEDICAL CENTER | Age: 39
End: 2019-12-16

## 2020-02-17 ENCOUNTER — HOSPITAL ENCOUNTER (OUTPATIENT)
Facility: MEDICAL CENTER | Age: 40
End: 2020-02-20
Attending: EMERGENCY MEDICINE | Admitting: HOSPITALIST
Payer: MEDICAID

## 2020-02-17 ENCOUNTER — APPOINTMENT (OUTPATIENT)
Dept: RADIOLOGY | Facility: MEDICAL CENTER | Age: 40
End: 2020-02-17
Attending: EMERGENCY MEDICINE
Payer: MEDICAID

## 2020-02-17 DIAGNOSIS — R79.89 ELEVATED TROPONIN: ICD-10-CM

## 2020-02-17 DIAGNOSIS — R07.9 ACUTE CHEST PAIN: ICD-10-CM

## 2020-02-17 LAB
ALBUMIN SERPL BCP-MCNC: 4.2 G/DL (ref 3.2–4.9)
ALBUMIN/GLOB SERPL: 1.2 G/DL
ALP SERPL-CCNC: 78 U/L (ref 30–99)
ALT SERPL-CCNC: 12 U/L (ref 2–50)
ANION GAP SERPL CALC-SCNC: 11 MMOL/L (ref 0–11.9)
AST SERPL-CCNC: 14 U/L (ref 12–45)
BASOPHILS # BLD AUTO: 0.3 % (ref 0–1.8)
BASOPHILS # BLD: 0.01 K/UL (ref 0–0.12)
BILIRUB SERPL-MCNC: 0.5 MG/DL (ref 0.1–1.5)
BUN SERPL-MCNC: 32 MG/DL (ref 8–22)
CALCIUM SERPL-MCNC: 9.2 MG/DL (ref 8.5–10.5)
CHLORIDE SERPL-SCNC: 96 MMOL/L (ref 96–112)
CO2 SERPL-SCNC: 29 MMOL/L (ref 20–33)
CREAT SERPL-MCNC: 6 MG/DL (ref 0.5–1.4)
EKG IMPRESSION: NORMAL
EOSINOPHIL # BLD AUTO: 0.12 K/UL (ref 0–0.51)
EOSINOPHIL NFR BLD: 3.8 % (ref 0–6.9)
ERYTHROCYTE [DISTWIDTH] IN BLOOD BY AUTOMATED COUNT: 39.8 FL (ref 35.9–50)
GLOBULIN SER CALC-MCNC: 3.4 G/DL (ref 1.9–3.5)
GLUCOSE SERPL-MCNC: 96 MG/DL (ref 65–99)
HCT VFR BLD AUTO: 33.8 % (ref 37–47)
HGB BLD-MCNC: 11.7 G/DL (ref 12–16)
IMM GRANULOCYTES # BLD AUTO: 0.01 K/UL (ref 0–0.11)
IMM GRANULOCYTES NFR BLD AUTO: 0.3 % (ref 0–0.9)
LYMPHOCYTES # BLD AUTO: 0.59 K/UL (ref 1–4.8)
LYMPHOCYTES NFR BLD: 18.8 % (ref 22–41)
MAGNESIUM SERPL-MCNC: 2.2 MG/DL (ref 1.5–2.5)
MCH RBC QN AUTO: 29.7 PG (ref 27–33)
MCHC RBC AUTO-ENTMCNC: 34.6 G/DL (ref 33.6–35)
MCV RBC AUTO: 85.8 FL (ref 81.4–97.8)
MONOCYTES # BLD AUTO: 0.37 K/UL (ref 0–0.85)
MONOCYTES NFR BLD AUTO: 11.8 % (ref 0–13.4)
NEUTROPHILS # BLD AUTO: 2.04 K/UL (ref 2–7.15)
NEUTROPHILS NFR BLD: 65 % (ref 44–72)
NRBC # BLD AUTO: 0 K/UL
NRBC BLD-RTO: 0 /100 WBC
PHOSPHATE SERPL-MCNC: 2.4 MG/DL (ref 2.5–4.5)
PLATELET # BLD AUTO: 165 K/UL (ref 164–446)
PMV BLD AUTO: 10.5 FL (ref 9–12.9)
POTASSIUM SERPL-SCNC: 3.7 MMOL/L (ref 3.6–5.5)
PROT SERPL-MCNC: 7.6 G/DL (ref 6–8.2)
RBC # BLD AUTO: 3.94 M/UL (ref 4.2–5.4)
SODIUM SERPL-SCNC: 136 MMOL/L (ref 135–145)
TROPONIN T SERPL-MCNC: 31 NG/L (ref 6–19)
WBC # BLD AUTO: 3.1 K/UL (ref 4.8–10.8)

## 2020-02-17 PROCEDURE — 83735 ASSAY OF MAGNESIUM: CPT

## 2020-02-17 PROCEDURE — 85025 COMPLETE CBC W/AUTO DIFF WBC: CPT

## 2020-02-17 PROCEDURE — 93005 ELECTROCARDIOGRAM TRACING: CPT

## 2020-02-17 PROCEDURE — 99285 EMERGENCY DEPT VISIT HI MDM: CPT

## 2020-02-17 PROCEDURE — 93005 ELECTROCARDIOGRAM TRACING: CPT | Performed by: EMERGENCY MEDICINE

## 2020-02-17 PROCEDURE — 71045 X-RAY EXAM CHEST 1 VIEW: CPT

## 2020-02-17 PROCEDURE — 99220 PR INITIAL OBSERVATION CARE,LEVL III: CPT | Performed by: HOSPITALIST

## 2020-02-17 PROCEDURE — 80053 COMPREHEN METABOLIC PANEL: CPT

## 2020-02-17 PROCEDURE — 84100 ASSAY OF PHOSPHORUS: CPT

## 2020-02-17 PROCEDURE — 84484 ASSAY OF TROPONIN QUANT: CPT

## 2020-02-17 PROCEDURE — G0378 HOSPITAL OBSERVATION PER HR: HCPCS

## 2020-02-17 RX ORDER — CLONIDINE HYDROCHLORIDE 0.1 MG/1
0.3 TABLET ORAL TWICE DAILY
Status: DISCONTINUED | OUTPATIENT
Start: 2020-02-18 | End: 2020-02-18

## 2020-02-17 RX ORDER — OMEPRAZOLE 20 MG/1
20 CAPSULE, DELAYED RELEASE ORAL DAILY
Status: DISCONTINUED | OUTPATIENT
Start: 2020-02-18 | End: 2020-02-18

## 2020-02-17 RX ORDER — ASPIRIN 81 MG/1
324 TABLET, CHEWABLE ORAL DAILY
Status: DISCONTINUED | OUTPATIENT
Start: 2020-02-18 | End: 2020-02-20 | Stop reason: HOSPADM

## 2020-02-17 RX ORDER — ASPIRIN 300 MG/1
300 SUPPOSITORY RECTAL DAILY
Status: DISCONTINUED | OUTPATIENT
Start: 2020-02-18 | End: 2020-02-20 | Stop reason: HOSPADM

## 2020-02-17 RX ORDER — POLYETHYLENE GLYCOL 3350 17 G/17G
1 POWDER, FOR SOLUTION ORAL
Status: DISCONTINUED | OUTPATIENT
Start: 2020-02-17 | End: 2020-02-20 | Stop reason: HOSPADM

## 2020-02-17 RX ORDER — BISACODYL 10 MG
10 SUPPOSITORY, RECTAL RECTAL
Status: DISCONTINUED | OUTPATIENT
Start: 2020-02-17 | End: 2020-02-20 | Stop reason: HOSPADM

## 2020-02-17 RX ORDER — AMOXICILLIN 250 MG
2 CAPSULE ORAL 2 TIMES DAILY
Status: DISCONTINUED | OUTPATIENT
Start: 2020-02-18 | End: 2020-02-20 | Stop reason: HOSPADM

## 2020-02-17 RX ORDER — PROMETHAZINE HYDROCHLORIDE 25 MG/1
12.5-25 TABLET ORAL EVERY 4 HOURS PRN
Status: DISCONTINUED | OUTPATIENT
Start: 2020-02-17 | End: 2020-02-20 | Stop reason: HOSPADM

## 2020-02-17 RX ORDER — PROCHLORPERAZINE EDISYLATE 5 MG/ML
5-10 INJECTION INTRAMUSCULAR; INTRAVENOUS EVERY 4 HOURS PRN
Status: DISCONTINUED | OUTPATIENT
Start: 2020-02-17 | End: 2020-02-20 | Stop reason: HOSPADM

## 2020-02-17 RX ORDER — ONDANSETRON 2 MG/ML
4 INJECTION INTRAMUSCULAR; INTRAVENOUS EVERY 4 HOURS PRN
Status: DISCONTINUED | OUTPATIENT
Start: 2020-02-17 | End: 2020-02-20 | Stop reason: HOSPADM

## 2020-02-17 RX ORDER — ASPIRIN 325 MG
325 TABLET ORAL DAILY
Status: DISCONTINUED | OUTPATIENT
Start: 2020-02-18 | End: 2020-02-20 | Stop reason: HOSPADM

## 2020-02-17 RX ORDER — PROMETHAZINE HYDROCHLORIDE 25 MG/1
12.5-25 SUPPOSITORY RECTAL EVERY 4 HOURS PRN
Status: DISCONTINUED | OUTPATIENT
Start: 2020-02-17 | End: 2020-02-20 | Stop reason: HOSPADM

## 2020-02-17 RX ORDER — ONDANSETRON 4 MG/1
4 TABLET, ORALLY DISINTEGRATING ORAL EVERY 4 HOURS PRN
Status: DISCONTINUED | OUTPATIENT
Start: 2020-02-17 | End: 2020-02-20 | Stop reason: HOSPADM

## 2020-02-17 RX ORDER — HEPARIN SODIUM 5000 [USP'U]/ML
5000 INJECTION, SOLUTION INTRAVENOUS; SUBCUTANEOUS EVERY 8 HOURS
Status: DISCONTINUED | OUTPATIENT
Start: 2020-02-18 | End: 2020-02-20 | Stop reason: HOSPADM

## 2020-02-17 RX ORDER — METOPROLOL TARTRATE 50 MG/1
50 TABLET, FILM COATED ORAL 2 TIMES DAILY
Status: DISCONTINUED | OUTPATIENT
Start: 2020-02-18 | End: 2020-02-20 | Stop reason: HOSPADM

## 2020-02-17 ASSESSMENT — PAIN DESCRIPTION - DESCRIPTORS: DESCRIPTORS: PRESSURE

## 2020-02-18 ENCOUNTER — APPOINTMENT (OUTPATIENT)
Dept: CARDIOLOGY | Facility: MEDICAL CENTER | Age: 40
End: 2020-02-18
Attending: FAMILY MEDICINE
Payer: MEDICAID

## 2020-02-18 ENCOUNTER — PATIENT OUTREACH (OUTPATIENT)
Dept: HEALTH INFORMATION MANAGEMENT | Facility: OTHER | Age: 40
End: 2020-02-18

## 2020-02-18 PROBLEM — Z86.73 HISTORY OF CVA (CEREBROVASCULAR ACCIDENT): Status: ACTIVE | Noted: 2020-02-18

## 2020-02-18 PROBLEM — R07.9 PAIN IN THE CHEST: Status: ACTIVE | Noted: 2020-02-18

## 2020-02-18 LAB
ALBUMIN SERPL BCP-MCNC: 4 G/DL (ref 3.2–4.9)
ALBUMIN/GLOB SERPL: 1.2 G/DL
ALP SERPL-CCNC: 75 U/L (ref 30–99)
ALT SERPL-CCNC: 11 U/L (ref 2–50)
ANION GAP SERPL CALC-SCNC: 11 MMOL/L (ref 0–11.9)
AST SERPL-CCNC: 12 U/L (ref 12–45)
BASOPHILS # BLD AUTO: 0.9 % (ref 0–1.8)
BASOPHILS # BLD: 0.03 K/UL (ref 0–0.12)
BILIRUB SERPL-MCNC: 0.5 MG/DL (ref 0.1–1.5)
BUN SERPL-MCNC: 38 MG/DL (ref 8–22)
CALCIUM SERPL-MCNC: 8.8 MG/DL (ref 8.5–10.5)
CHLORIDE SERPL-SCNC: 95 MMOL/L (ref 96–112)
CO2 SERPL-SCNC: 31 MMOL/L (ref 20–33)
CREAT SERPL-MCNC: 6.85 MG/DL (ref 0.5–1.4)
D DIMER PPP IA.FEU-MCNC: 0.63 UG/ML (FEU) (ref 0–0.5)
EKG IMPRESSION: NORMAL
EOSINOPHIL # BLD AUTO: 0.15 K/UL (ref 0–0.51)
EOSINOPHIL NFR BLD: 4.6 % (ref 0–6.9)
ERYTHROCYTE [DISTWIDTH] IN BLOOD BY AUTOMATED COUNT: 42.7 FL (ref 35.9–50)
GLOBULIN SER CALC-MCNC: 3.4 G/DL (ref 1.9–3.5)
GLUCOSE SERPL-MCNC: 114 MG/DL (ref 65–99)
HAV IGM SERPL QL IA: NEGATIVE
HBV CORE IGM SER QL: NEGATIVE
HBV SURFACE AB SERPL IA-ACNC: >1000 MIU/ML (ref 0–10)
HBV SURFACE AG SER QL: NEGATIVE
HCT VFR BLD AUTO: 34.8 % (ref 37–47)
HCV AB SER QL: NEGATIVE
HGB BLD-MCNC: 11.5 G/DL (ref 12–16)
IMM GRANULOCYTES # BLD AUTO: 0.01 K/UL (ref 0–0.11)
IMM GRANULOCYTES NFR BLD AUTO: 0.3 % (ref 0–0.9)
LYMPHOCYTES # BLD AUTO: 0.77 K/UL (ref 1–4.8)
LYMPHOCYTES NFR BLD: 23.7 % (ref 22–41)
MCH RBC QN AUTO: 29.4 PG (ref 27–33)
MCHC RBC AUTO-ENTMCNC: 33 G/DL (ref 33.6–35)
MCV RBC AUTO: 89 FL (ref 81.4–97.8)
MONOCYTES # BLD AUTO: 0.35 K/UL (ref 0–0.85)
MONOCYTES NFR BLD AUTO: 10.8 % (ref 0–13.4)
NEUTROPHILS # BLD AUTO: 1.94 K/UL (ref 2–7.15)
NEUTROPHILS NFR BLD: 59.7 % (ref 44–72)
NRBC # BLD AUTO: 0 K/UL
NRBC BLD-RTO: 0 /100 WBC
PLATELET # BLD AUTO: 167 K/UL (ref 164–446)
PMV BLD AUTO: 10.8 FL (ref 9–12.9)
POTASSIUM SERPL-SCNC: 4.2 MMOL/L (ref 3.6–5.5)
PROT SERPL-MCNC: 7.4 G/DL (ref 6–8.2)
RBC # BLD AUTO: 3.91 M/UL (ref 4.2–5.4)
SODIUM SERPL-SCNC: 137 MMOL/L (ref 135–145)
TROPONIN T SERPL-MCNC: 29 NG/L (ref 6–19)
WBC # BLD AUTO: 3.3 K/UL (ref 4.8–10.8)

## 2020-02-18 PROCEDURE — 36415 COLL VENOUS BLD VENIPUNCTURE: CPT

## 2020-02-18 PROCEDURE — G0378 HOSPITAL OBSERVATION PER HR: HCPCS

## 2020-02-18 PROCEDURE — 96372 THER/PROPH/DIAG INJ SC/IM: CPT

## 2020-02-18 PROCEDURE — 80053 COMPREHEN METABOLIC PANEL: CPT

## 2020-02-18 PROCEDURE — 85025 COMPLETE CBC W/AUTO DIFF WBC: CPT

## 2020-02-18 PROCEDURE — 80074 ACUTE HEPATITIS PANEL: CPT

## 2020-02-18 PROCEDURE — 93010 ELECTROCARDIOGRAM REPORT: CPT | Performed by: INTERNAL MEDICINE

## 2020-02-18 PROCEDURE — 86706 HEP B SURFACE ANTIBODY: CPT

## 2020-02-18 PROCEDURE — 700111 HCHG RX REV CODE 636 W/ 250 OVERRIDE (IP): Performed by: HOSPITALIST

## 2020-02-18 PROCEDURE — A9270 NON-COVERED ITEM OR SERVICE: HCPCS | Performed by: HOSPITALIST

## 2020-02-18 PROCEDURE — 700102 HCHG RX REV CODE 250 W/ 637 OVERRIDE(OP): Performed by: HOSPITALIST

## 2020-02-18 PROCEDURE — 99226 PR SUBSEQUENT OBSERVATION CARE,LEVEL III: CPT | Performed by: FAMILY MEDICINE

## 2020-02-18 PROCEDURE — 93005 ELECTROCARDIOGRAM TRACING: CPT | Performed by: HOSPITALIST

## 2020-02-18 PROCEDURE — 85379 FIBRIN DEGRADATION QUANT: CPT

## 2020-02-18 PROCEDURE — 99214 OFFICE O/P EST MOD 30 MIN: CPT | Performed by: INTERNAL MEDICINE

## 2020-02-18 PROCEDURE — 84484 ASSAY OF TROPONIN QUANT: CPT

## 2020-02-18 RX ORDER — HYDRALAZINE HYDROCHLORIDE 20 MG/ML
10 INJECTION INTRAMUSCULAR; INTRAVENOUS EVERY 6 HOURS PRN
Status: DISCONTINUED | OUTPATIENT
Start: 2020-02-18 | End: 2020-02-20 | Stop reason: HOSPADM

## 2020-02-18 RX ORDER — METOPROLOL TARTRATE 50 MG/1
50 TABLET, FILM COATED ORAL 2 TIMES DAILY
COMMUNITY
End: 2020-03-17

## 2020-02-18 RX ORDER — ALBUMIN (HUMAN) 12.5 G/50ML
12.5 SOLUTION INTRAVENOUS
Status: DISCONTINUED | OUTPATIENT
Start: 2020-02-18 | End: 2020-02-20 | Stop reason: HOSPADM

## 2020-02-18 RX ORDER — SODIUM CHLORIDE 9 MG/ML
250 INJECTION, SOLUTION INTRAVENOUS
Status: DISCONTINUED | OUTPATIENT
Start: 2020-02-18 | End: 2020-02-20 | Stop reason: HOSPADM

## 2020-02-18 RX ADMIN — METOPROLOL TARTRATE 50 MG: 50 TABLET, FILM COATED ORAL at 23:09

## 2020-02-18 RX ADMIN — HEPARIN SODIUM 5000 UNITS: 5000 INJECTION, SOLUTION INTRAVENOUS; SUBCUTANEOUS at 23:09

## 2020-02-18 RX ADMIN — METOPROLOL TARTRATE 50 MG: 50 TABLET, FILM COATED ORAL at 02:27

## 2020-02-18 RX ADMIN — METOPROLOL TARTRATE 50 MG: 50 TABLET, FILM COATED ORAL at 11:58

## 2020-02-18 RX ADMIN — HEPARIN SODIUM 5000 UNITS: 5000 INJECTION, SOLUTION INTRAVENOUS; SUBCUTANEOUS at 06:21

## 2020-02-18 RX ADMIN — ASPIRIN 325 MG: 325 TABLET, FILM COATED ORAL at 06:21

## 2020-02-18 RX ADMIN — HEPARIN SODIUM 5000 UNITS: 5000 INJECTION, SOLUTION INTRAVENOUS; SUBCUTANEOUS at 14:39

## 2020-02-18 SDOH — ECONOMIC STABILITY: FOOD INSECURITY: WITHIN THE PAST 12 MONTHS, THE FOOD YOU BOUGHT JUST DIDN'T LAST AND YOU DIDN'T HAVE MONEY TO GET MORE.: SOMETIMES TRUE

## 2020-02-18 SDOH — ECONOMIC STABILITY: INCOME INSECURITY: HOW HARD IS IT FOR YOU TO PAY FOR THE VERY BASICS LIKE FOOD, HOUSING, MEDICAL CARE, AND HEATING?: HARD

## 2020-02-18 SDOH — ECONOMIC STABILITY: TRANSPORTATION INSECURITY
IN THE PAST 12 MONTHS, HAS THE LACK OF TRANSPORTATION KEPT YOU FROM MEDICAL APPOINTMENTS OR FROM GETTING MEDICATIONS?: NO

## 2020-02-18 SDOH — ECONOMIC STABILITY: FOOD INSECURITY: WITHIN THE PAST 12 MONTHS, YOU WORRIED THAT YOUR FOOD WOULD RUN OUT BEFORE YOU GOT MONEY TO BUY MORE.: SOMETIMES TRUE

## 2020-02-18 SDOH — ECONOMIC STABILITY: TRANSPORTATION INSECURITY
IN THE PAST 12 MONTHS, HAS LACK OF TRANSPORTATION KEPT YOU FROM MEETINGS, WORK, OR FROM GETTING THINGS NEEDED FOR DAILY LIVING?: NO

## 2020-02-18 ASSESSMENT — ENCOUNTER SYMPTOMS
PALPITATIONS: 0
BACK PAIN: 0
HEMOPTYSIS: 0
DIZZINESS: 0
SPEECH CHANGE: 0
NECK PAIN: 0
SORE THROAT: 0
FOCAL WEAKNESS: 0
ABDOMINAL PAIN: 0
CHILLS: 0
HEARTBURN: 0
NAUSEA: 0
FEVER: 0
DIARRHEA: 0
HALLUCINATIONS: 0
SENSORY CHANGE: 0
WEAKNESS: 0
SHORTNESS OF BREATH: 0
EYE DISCHARGE: 0
FLANK PAIN: 0
NERVOUS/ANXIOUS: 0
BRUISES/BLEEDS EASILY: 0
DIAPHORESIS: 0
SHORTNESS OF BREATH: 1
BLURRED VISION: 0
MYALGIAS: 0
COUGH: 0
VOMITING: 0
DEPRESSION: 0
DOUBLE VISION: 0
HEADACHES: 0
WHEEZING: 0

## 2020-02-18 ASSESSMENT — COGNITIVE AND FUNCTIONAL STATUS - GENERAL
SUGGESTED CMS G CODE MODIFIER MOBILITY: CH
DAILY ACTIVITIY SCORE: 24
MOBILITY SCORE: 24
SUGGESTED CMS G CODE MODIFIER DAILY ACTIVITY: CH

## 2020-02-18 ASSESSMENT — PATIENT HEALTH QUESTIONNAIRE - PHQ9
2. FEELING DOWN, DEPRESSED, IRRITABLE, OR HOPELESS: NOT AT ALL
1. LITTLE INTEREST OR PLEASURE IN DOING THINGS: NOT AT ALL
2. FEELING DOWN, DEPRESSED, IRRITABLE, OR HOPELESS: NOT AT ALL
SUM OF ALL RESPONSES TO PHQ9 QUESTIONS 1 AND 2: 0
1. LITTLE INTEREST OR PLEASURE IN DOING THINGS: NOT AT ALL
1. LITTLE INTEREST OR PLEASURE IN DOING THINGS: NOT AT ALL
SUM OF ALL RESPONSES TO PHQ9 QUESTIONS 1 AND 2: 0
SUM OF ALL RESPONSES TO PHQ9 QUESTIONS 1 AND 2: 0
2. FEELING DOWN, DEPRESSED, IRRITABLE, OR HOPELESS: NOT AT ALL

## 2020-02-18 ASSESSMENT — LIFESTYLE VARIABLES
EVER FELT BAD OR GUILTY ABOUT YOUR DRINKING: NO
HOW MANY TIMES IN THE PAST YEAR HAVE YOU HAD 5 OR MORE DRINKS IN A DAY: 0
AVERAGE NUMBER OF DAYS PER WEEK YOU HAVE A DRINK CONTAINING ALCOHOL: 0
TOTAL SCORE: 0
ON A TYPICAL DAY WHEN YOU DRINK ALCOHOL HOW MANY DRINKS DO YOU HAVE: 0
CONSUMPTION TOTAL: NEGATIVE
ALCOHOL_USE: NO
EVER HAD A DRINK FIRST THING IN THE MORNING TO STEADY YOUR NERVES TO GET RID OF A HANGOVER: NO
SUBSTANCE_ABUSE: 0
HAVE PEOPLE ANNOYED YOU BY CRITICIZING YOUR DRINKING: NO
EVER_SMOKED: NEVER
TOTAL SCORE: 0
TOTAL SCORE: 0
HAVE YOU EVER FELT YOU SHOULD CUT DOWN ON YOUR DRINKING: NO

## 2020-02-18 NOTE — PROGRESS NOTES
Pt arrived to the floor. Pt A&Ox4, on RA, pt oriented to room. Bed in lowest position, wheels locked, skin check complete, and call bell within reach. No needs at this time. Will continue to monitor.

## 2020-02-18 NOTE — ED NOTES
Med rec updated and complete. Allergies reviewed.  Met with pt at bedside.    interrupting services used.    Kady # [id number]

## 2020-02-18 NOTE — CONSULTS
Nephrology Consultation    Date of Service  2/18/2020    Referring Physician  Jimmie Blunt M.D.    Consulting Physician  Jessee Shaw M.D.    Reason for Consultation  Management of ESRD on HD      History of Presenting Illness  39 y.o. female with history of ESRD on hemodialysis Monday Wednesday Friday at [facility] who presented 2/17/2020 with chest pain.    The patient was in her usual state of health, and near the end of her dialysis session yesterday, she experienced chest tightness.  The patient was taken off of dialysis about 10 minutes early.  When she went home, the sensation became a little worse, so she came into the hospital.  She says that she has experienced a similar kind of sensation in the past, but it was actually worse in the past than this time.  There are plans for cardiac stress test tomorrow.    Regarding the patient's kidney disease, she has a history of lupus nephritis, seen on kidney biopsy in 2005 in 2006.  The patient thinks she was treated with CellCept, and azathioprine.  However, she moved to [place], and was told that she did not have lupus nephritis and stopped taking those medicines.  Unfortunately her kidney disease progressed to ESRD, and the patient was started on dialysis in the last 6 or 7 years.  She is now anuric.  The patient does not have insurance, so she is not listed for transplant.  The patient dialyzes via a left radiocephalic AV fistula, which works well for her.    Review of Systems  Review of Systems   Constitutional: Negative for fever.   Respiratory: Negative for shortness of breath.    Cardiovascular: Negative for chest pain.   Gastrointestinal: Negative for abdominal pain.   All other systems reviewed and are negative.      Past Medical History  Past Medical History:   Diagnosis Date   • Dialysis patient (Formerly [facility])    • EKG abnormality     borderline prolonged QTC.   • ESRD (end stage renal disease) on dialysis (Formerly [facility]) 2014   • Hypertension    • Lupus  "nephritis, ISN/RPS class IV (Formerly [facility])        Surgical History  Past Surgical History:   Procedure Laterality Date   • OTHER CARDIAC SURGERY      \"liquid drained from heart\", per pt   • PRIMARY C SECTION       (of some type)       Family History  Family History   Problem Relation Age of Onset   • Kidney Disease Neg Hx       Denies family history of ESRD      Social History  Social History     Socioeconomic History   • Marital status:      Spouse name: Not on file   • Number of children: Not on file   • Years of education: Not on file   • Highest education level: Not on file   Occupational History   • Not on file   Social Needs   • Financial resource strain: Hard   • Food insecurity     Worry: Sometimes true     Inability: Sometimes true   • Transportation needs     Medical: No     Non-medical: No   Tobacco Use   • Smoking status: Never Smoker   • Smokeless tobacco: Never Used   Substance and Sexual Activity   • Alcohol use: No   • Drug use: No   • Sexual activity: Not on file   Lifestyle   • Physical activity     Days per week: Not on file     Minutes per session: Not on file   • Stress: Not on file   Relationships   • Social connections     Talks on phone: Not on file     Gets together: Not on file     Attends [employer] service: Not on file     Active member of club or organization: Not on file     Attends meetings of clubs or organizations: Not on file     Relationship status: Not on file   • Intimate partner violence     Fear of current or ex partner: Not on file     Emotionally abused: Not on file     Physically abused: Not on file     Forced sexual activity: Not on file   Other Topics Concern   • Not on file   Social History Narrative   • Not on file       Medications  Current Facility-Administered Medications   Medication Dose Route Frequency Provider Last Rate Last Dose   • hydrALAZINE (APRESOLINE) injection 10 mg  10 mg Intravenous Q6HRS PRN Jimmie Blunt M.D.       • metoprolol " (LOPRESSOR) tablet 50 mg  50 mg Oral BID Melina Bone M.D.   50 mg at 02/18/20 1158   • aspirin (ASA) tablet 325 mg  325 mg Oral DAILY Melina Bone M.D.   325 mg at 02/18/20 0621    Or   • aspirin (ASA) chewable tab 324 mg  324 mg Oral DAILY Melina Bone M.D.        Or   • aspirin (ASA) suppository 300 mg  300 mg Rectal DAILY Melina Bone M.D.       • senna-docusate (PERICOLACE or SENOKOT S) 8.6-50 MG per tablet 2 Tab  2 Tab Oral BID Melina Bone M.D.        And   • polyethylene glycol/lytes (MIRALAX) PACKET 1 Packet  1 Packet Oral QDAY PRN Melina Bone M.D.        And   • magnesium hydroxide (MILK OF MAGNESIA) suspension 30 mL  30 mL Oral QDAY PRN Melina Bone M.D.        And   • bisacodyl (DULCOLAX) suppository 10 mg  10 mg Rectal QDAY PRN Melina Bone M.D.       • heparin injection 5,000 Units  5,000 Units Subcutaneous Q8HRS Melina Bone M.D.   5,000 Units at 02/18/20 1439   • ondansetron (ZOFRAN) syringe/vial injection 4 mg  4 mg Intravenous Q4HRS PRN Melina Bone M.D.       • ondansetron (ZOFRAN ODT) dispertab 4 mg  4 mg Oral Q4HRS PRN Melina Bone M.D.       • promethazine (PHENERGAN) tablet 12.5-25 mg  12.5-25 mg Oral Q4HRS PRN Melina Bone M.D.       • promethazine (PHENERGAN) suppository 12.5-25 mg  12.5-25 mg Rectal Q4HRS PRN Melina Bone M.D.       • prochlorperazine (COMPAZINE) injection 5-10 mg  5-10 mg Intravenous Q4HRS PRN Melina Bone M.D.           Allergies  Allergies   Allergen Reactions   • Iodine Rash     Rash   • Labetalol Palpitations     Fast heart rate, rash, HTN   • Morphine      Pt states that she can tolerate small dose of morphine (can tolerate up to 2mg dosage).   • Pork Allergy Itching     Itchy skin       Physical Exam  Temp:  [36.3 °C (97.3 °F)-37.4 °C (99.3 °F)] 36.6 °C (97.9 °F)  Pulse:  [63-72] 65  Resp:  [18] 18  BP: (120-160)/() 150/92  SpO2:  [98 %-100 %] 99 %    Physical Exam   Constitutional: She is  oriented to person, place, and time. She appears well-developed. No distress.   HENT:   Mouth/Throat: Oropharynx is clear and moist. No oropharyngeal exudate.   Eyes: EOM are normal. No scleral icterus.   Neck: No tracheal deviation present.   Cardiovascular: Normal heart sounds.   No murmur heard.  Pulmonary/Chest: Effort normal. No stridor. She has no rales.   Abdominal: Soft. There is no abdominal tenderness.   Musculoskeletal: Normal range of motion.         General: No edema.   Neurological: She is alert and oriented to person, place, and time.   Skin: Skin is warm and dry.   Psychiatric: She has a normal mood and affect.   Access: Left RC AVF with patent bruit and thrill.       Fluids      Laboratory  Labs reviewed, pertinent labs below.  Recent Labs     02/17/20 2242 02/18/20  0351   WBC 3.1* 3.3*   RBC 3.94* 3.91*   HEMOGLOBIN 11.7* 11.5*   HEMATOCRIT 33.8* 34.8*   MCV 85.8 89.0   MCH 29.7 29.4   MCHC 34.6 33.0*   RDW 39.8 42.7   PLATELETCT 165 167   MPV 10.5 10.8     Recent Labs     02/17/20 2242 02/18/20  0350   SODIUM 136 137   POTASSIUM 3.7 4.2   CHLORIDE 96 95*   CO2 29 31   GLUCOSE 96 114*   BUN 32* 38*   CREATININE 6.00* 6.85*   CALCIUM 9.2 8.8                URINALYSIS:  Lab Results   Component Value Date/Time    COLORURINE Lt. Yellow 01/14/2015 0050    CLARITY Sl Cloudy (A) 01/14/2015 0050    SPECGRAVITY 1.009 01/14/2015 0050    PHURINE 6.0 01/14/2015 0050    KETONES Negative 01/14/2015 0050    PROTEINURIN 200 (A) 01/14/2015 0050    BILIRUBINUR Negative 01/14/2015 0050    NITRITE Negative 01/14/2015 0050    LEUKESTERAS Negative 01/14/2015 0050    OCCULTBLOOD Trace (A) 01/14/2015 0050     UPC  Lab Results   Component Value Date/Time    TOTPROTUR 53.0 (H) 05/05/2008 0730      Lab Results   Component Value Date/Time    CREATININEU 39 08/08/2007 1215       Imaging reviewed  DX-CHEST-PORTABLE (1 VIEW)   Final Result         1.  No acute cardiopulmonary disease.      EC-ECHOCARDIOGRAM COMPLETE W/O CONT     (Results Pending)   NM-CARDIAC STRESS TEST    (Results Pending)         Assessment/Plan  39 y.o. female with history of ESRD on hemodialysis Monday Wednesday Friday at [facility] who presented 2/17/2020 with chest pain.    1.  ESRD on hemodialysis Monday Wednesday Friday.  The patient is anuric.  No acute need for dialysis today.  Plan for dialysis per usual schedule tomorrow.  Check labs daily.    2.  Access: Left radiocephalic AV fistula.  Stable.  Avoid blood pressure checks, IVs, and lab draws in the left arm.    3.  Chest pain, unclear etiology, agree with cardiac work-up.  Patient at risk for coronary artery disease given history of ESRD.    4.  Itching, unclear etiology.  Phosphorus level actually on the low side.     5.  Anemia of chronic disease, mild.  No acute need for Epogen with hemoglobin over 11.    6.  Leukopenia, slightly improved.  Unclear etiology.  Check CBC daily.    7.  Hypertension, controlled.  Plan for ultrafiltration with dialysis as tolerated.  Low-salt diet.      Jessee Shaw MD  Nephrology

## 2020-02-18 NOTE — H&P
Hospital Medicine History & Physical Note    Date of Service  2/17/2020    Primary Care Physician  Quinton Christy M.D.    Consultants  none    Code Status  full    Chief Complaint  Chest pain     History of Presenting Illness  39 y.o. female who presented 2/17/2020 with past medical history of end-stage renal disease on dialysis Monday Wednesday Friday, hypertension, hyperlipidemia presents with chest pain.  This patient had full run of dialysis earlier today.  Shortly after dialysis started developing substernal chest pressure.  This radiated into her left arm and she had some left-sided numbness and tingling.  As time my examination she denies any chest pain.  Otherwise she states that she has had dyspnea on exertion.  And alleviated by rest.  Otherwise no known alleviating or exacerbating factors to her symptoms.  She will be admitted to the hospital for ACS rule out.    Review of Systems  Review of Systems   Constitutional: Positive for malaise/fatigue. Negative for chills and fever.   HENT: Negative for congestion, hearing loss and tinnitus.    Eyes: Negative for blurred vision, double vision and discharge.   Respiratory: Positive for shortness of breath. Negative for cough and hemoptysis.    Cardiovascular: Positive for chest pain. Negative for palpitations and leg swelling.   Gastrointestinal: Negative for abdominal pain, heartburn, nausea and vomiting.   Genitourinary: Negative for dysuria and flank pain.   Musculoskeletal: Negative for joint pain and myalgias.   Skin: Negative for rash.   Neurological: Negative for dizziness, sensory change, speech change, focal weakness and weakness.   Endo/Heme/Allergies: Negative for environmental allergies. Does not bruise/bleed easily.   Psychiatric/Behavioral: Negative for depression, hallucinations and substance abuse.       Past Medical History   has a past medical history of Dialysis patient (HCC), EKG abnormality, Hypertension, and Renal disorder.    Surgical  History   has a past surgical history that includes other cardiac surgery and primary c section.     Family History  Reviewed and not pertinent     Social History   reports that she has never smoked. She has never used smokeless tobacco. She reports that she does not drink alcohol or use drugs.    Allergies  Allergies   Allergen Reactions   • Iodine Rash     Rash   • Labetalol Palpitations     Fast heart rate, rash, HTN   • Morphine      Pt states that she can tolerate small dose of morphine (can tolerate up to 2mg dosage).   • Pork Allergy Itching     Itchy skin       Medications  Prior to Admission Medications   Prescriptions Last Dose Informant Patient Reported? Taking?   B Complex-C-Folic Acid (DIALYVITE 800 PO)  Rx Bottle (For Med Information) Yes No   Sig: Take 1 Tab by mouth every evening.   RENVELA 800 MG Tab  Rx Bottle (For Med Information) No No   Sig: TAKE 1 TABLET BY MOUTH 3 TIMES DAILY    calcium carbonate (TUMS EXTRA STRENGTH 750) 750 MG chewable tablet  Rx Bottle (For Med Information) Yes No   Sig: Take 1 Tab by mouth as needed.   cloNIDine (CATAPRESS) 0.3 MG Tab  Rx Bottle (For Med Information) No No   Sig: TAKE ONE (1) TABLET BY MOUTH TWICE DAILY.   lanthanum carbonate (FOSRENOL) 500 MG Chew Tab  Rx Bottle (For Med Information) Yes No   Sig: Take 500 mg by mouth 3 times a day, with meals.   metoprolol (LOPRESSOR) 100 MG Tab  Rx Bottle (For Med Information) Yes No   Sig: Take 100 mg by mouth 2 times a day.   omeprazole (PRILOSEC) 20 MG delayed-release capsule  Rx Bottle (For Med Information) Yes No   Sig: Take 20 mg by mouth every day.      Facility-Administered Medications: None       Physical Exam  Temp:  [37.4 °C (99.3 °F)] 37.4 °C (99.3 °F)  Pulse:  [63-68] 63  Resp:  [18] 18  BP: (131-160)/() 131/80  SpO2:  [98 %-100 %] 98 %    Physical Exam  Vitals signs reviewed.   Constitutional:       Appearance: Normal appearance.   HENT:      Head: Normocephalic and atraumatic.      Nose: No  congestion.      Mouth/Throat:      Mouth: Mucous membranes are dry.   Eyes:      Extraocular Movements: Extraocular movements intact.      Pupils: Pupils are equal, round, and reactive to light.   Neck:      Musculoskeletal: Normal range of motion and neck supple. No muscular tenderness.   Cardiovascular:      Rate and Rhythm: Normal rate and regular rhythm.      Pulses: Normal pulses.      Heart sounds: Normal heart sounds. No murmur.   Pulmonary:      Effort: Pulmonary effort is normal. No respiratory distress.      Breath sounds: Normal breath sounds. No stridor.   Abdominal:      General: Bowel sounds are normal. There is no distension.      Palpations: Abdomen is soft.      Tenderness: There is no abdominal tenderness.   Musculoskeletal:         General: No swelling or deformity.   Skin:     General: Skin is warm and dry.      Capillary Refill: Capillary refill takes 2 to 3 seconds.   Neurological:      General: No focal deficit present.      Mental Status: She is alert and oriented to person, place, and time.   Psychiatric:         Mood and Affect: Mood normal.         Behavior: Behavior normal.         Thought Content: Thought content normal.         Judgment: Judgment normal.      Comments: anxious         Laboratory:  Recent Labs     02/17/20  2242   WBC 3.1*   RBC 3.94*   HEMOGLOBIN 11.7*   HEMATOCRIT 33.8*   MCV 85.8   MCH 29.7   MCHC 34.6   RDW 39.8   PLATELETCT 165   MPV 10.5     Recent Labs     02/17/20  2242   SODIUM 136   POTASSIUM 3.7   CHLORIDE 96   CO2 29   GLUCOSE 96   BUN 32*   CREATININE 6.00*   CALCIUM 9.2     Recent Labs     02/17/20  2242   ALTSGPT 12   ASTSGOT 14   ALKPHOSPHAT 78   TBILIRUBIN 0.5   GLUCOSE 96         No results for input(s): NTPROBNP in the last 72 hours.      Recent Labs     02/17/20  2242   TROPONINT 31*       Urinalysis:    No results found     Imaging:  DX-CHEST-PORTABLE (1 VIEW)   Final Result         1.  No acute cardiopulmonary disease.             Assessment/Plan:  I anticipate this patient is appropriate for observation status at this time.    * Pain in the chest  Assessment & Plan  She has pretty good story, chest pain with dyspnea on exertion and left sided radiation  Admit to telemtry   Minimally elevated troponin in setting of esrd, serial troponin and ekg   Full dose asa   If troponin continue to rise may need BB, statin therapy, heparin ggt and cardiology eval   Cardiac stress in am       ESRD (end stage renal disease) on dialysis (HCC)- (present on admission)  Assessment & Plan  Follows with Nephrology   Had Full run dialysis this am     Chronic diastolic congestive heart failure (HCC)- (present on admission)  Assessment & Plan  Hx of, stage 1 DD and pulmonary HTN   EF 60% last echocardiogram     Pulmonary hypertension (HCC)- (present on admission)  Assessment & Plan  Chronic, cont to montior    Essential hypertension- (present on admission)  Assessment & Plan  Resume home BB and clonodine       Elevated troponin  Assessment & Plan  Cont with cardiac monitoring   Serial troponin and ekg     Anemia of chronic disease- (present on admission)  Assessment & Plan  No evidence of bleeding   Cont to monitor    Leukopenia- (present on admission)  Assessment & Plan  Chronic cont to trend       VTE prophylaxis: heparin

## 2020-02-18 NOTE — DISCHARGE PLANNING
Outpatient Dialysis Note    Confirmed patient is active at:    [facility]  [address]  [place] [address]       Schedule: Monday, Wednesday, Friday  Time: 3:30 pm    Spoke with Jovita at facility who confirmed.    Forwarded records for review.      Aimee Munoz- Dialysis Coordinator  [facility] # [phone]

## 2020-02-18 NOTE — PROGRESS NOTES
Community Health Worker Intake completed in [place] on 2/18/20 by CHW Rao.   • Social determinates of health intake completed.   • Contact information provided to Mago Farfan.  • Pt. Would like information on [facility] sliding scale fee and Access to Healthcare.  • Referral to RN or SW declined at this time.   • Pt. Provided permission to text.    Plan:  CHW will provided pt. With [facility] and Access to Healthcare information.

## 2020-02-18 NOTE — PROGRESS NOTES
2 RN skin check complete.   Devices in place none .  Skin assessed under devices n/a.  Confirmed pressure ulcers found on n/a.  New potential pressure ulcers noted on n/a. Wound consult placed n/a.  Skin intact, av fistula on left arm

## 2020-02-18 NOTE — PROGRESS NOTES
Report received. Assumed care. Pt in bed awake. A/O x4. VSS. Responds appropriately. Denies pain, SOB. Assessment complete. Left AV fistula noted + bruit and thrill. Tele monitor on. Discussed POC, monitor VS/labs, NPO, stress test today, safety, DC planning  , pt verbalizes understanding. Explained importance of calling before getting OOB. Call light and belongings within reach. Bed alarm on. Bed in the lowest position. Treaded socks in place. Hourly rounding in progress. Will continue to monitor .

## 2020-02-18 NOTE — CARE PLAN
Problem: Safety  Goal: Will remain free from injury  Outcome: PROGRESSING AS EXPECTED  Note: Treaded socks in place, bed in the lowest position, bed alarm on, call light and belongings within reach, pt call for assistance appropriately      Problem: Knowledge Deficit  Goal: Knowledge of disease process/condition, treatment plan, diagnostic tests, and medications will improve  Outcome: PROGRESSING AS EXPECTED  Note: Educated pt on POC, all questions answered, hourly rounding in progress

## 2020-02-18 NOTE — PROGRESS NOTES
Hospital Medicine Daily Progress Note    Date of Service  2/18/2020    Chief Complaint  39 y.o. female admitted 2/17/2020 with Chest pain    Hospital Course  Admitted with chest pain.    Interval Problem Update  Chest pain - less noted today, troponins trended down.  Unable to do stress test today as isotope not available  HTN - sbp 120-150  ESRD - HD MWF, discussed case with Nephrology     services were used in the patient's primary language of Norwegian using iPAD.    Consultants/Specialty  Nephrology    Code Status  Full    Disposition  TBD    Review of Systems  Review of Systems   Constitutional: Negative for chills, diaphoresis, fever and malaise/fatigue.   HENT: Negative for hearing loss and sore throat.    Eyes: Negative for blurred vision.   Respiratory: Positive for shortness of breath. Negative for cough and wheezing.    Cardiovascular: Positive for chest pain. Negative for palpitations and leg swelling.   Gastrointestinal: Negative for abdominal pain, diarrhea, heartburn, nausea and vomiting.   Genitourinary: Negative for dysuria, flank pain and hematuria.   Musculoskeletal: Negative for back pain and neck pain.   Skin: Negative for rash.   Neurological: Negative for dizziness, sensory change, speech change, focal weakness, weakness and headaches.   Psychiatric/Behavioral: The patient is not nervous/anxious.         Physical Exam  Temp:  [36.3 °C (97.3 °F)-37.4 °C (99.3 °F)] 36.6 °C (97.9 °F)  Pulse:  [63-72] 65  Resp:  [18] 18  BP: (120-160)/() 150/92  SpO2:  [98 %-100 %] 99 %    Physical Exam  HENT:      Head: Normocephalic and atraumatic.      Nose: No congestion.      Mouth/Throat:      Mouth: Mucous membranes are moist.   Eyes:      Conjunctiva/sclera: Conjunctivae normal.      Pupils: Pupils are equal, round, and reactive to light.   Neck:      Musculoskeletal: No muscular tenderness.   Cardiovascular:      Rate and Rhythm: Normal rate and regular rhythm.   Pulmonary:      Effort:  Pulmonary effort is normal.      Breath sounds: Normal breath sounds.   Abdominal:      General: Bowel sounds are normal. There is no distension.      Palpations: Abdomen is soft.      Tenderness: There is no abdominal tenderness. There is no guarding or rebound.   Musculoskeletal:      Right lower leg: No edema.      Left lower leg: No edema.   Lymphadenopathy:      Cervical: No cervical adenopathy.   Skin:     General: Skin is warm and dry.   Neurological:      General: No focal deficit present.      Mental Status: She is alert and oriented to person, place, and time.      Cranial Nerves: No cranial nerve deficit.         Fluids  No intake or output data in the 24 hours ending 02/18/20 1315    Laboratory  Recent Labs     02/17/20 2242 02/18/20  0351   WBC 3.1* 3.3*   RBC 3.94* 3.91*   HEMOGLOBIN 11.7* 11.5*   HEMATOCRIT 33.8* 34.8*   MCV 85.8 89.0   MCH 29.7 29.4   MCHC 34.6 33.0*   RDW 39.8 42.7   PLATELETCT 165 167   MPV 10.5 10.8     Recent Labs     02/17/20 2242 02/18/20  0350   SODIUM 136 137   POTASSIUM 3.7 4.2   CHLORIDE 96 95*   CO2 29 31   GLUCOSE 96 114*   BUN 32* 38*   CREATININE 6.00* 6.85*   CALCIUM 9.2 8.8                   Imaging  DX-CHEST-PORTABLE (1 VIEW)   Final Result         1.  No acute cardiopulmonary disease.      EC-ECHOCARDIOGRAM COMPLETE W/O CONT    (Results Pending)   NM-CARDIAC STRESS TEST    (Results Pending)        Assessment/Plan  * Pain in the chest- (present on admission)  Assessment & Plan  ASA, metoprolol  Check lipid profile, d dimer  Check stress test and echocardiogram      History of CVA (cerebrovascular accident)- (present on admission)  Assessment & Plan  ASA    Pulmonary hypertension (HCC)- (present on admission)  Assessment & Plan  Watch fluid status    Essential hypertension- (present on admission)  Assessment & Plan  Metoprolol  Start IV hydralazine as needed with parameters      Elevated troponin- (present on admission)  Assessment & Plan  ASA  For stress test and  echocardiogram    ESRD (end stage renal disease) on dialysis (HCC)- (present on admission)  Assessment & Plan  Consult Nephrology    Anemia of chronic disease- (present on admission)  Assessment & Plan  Follow CBC    Leukopenia- (present on admission)  Assessment & Plan  Follow CBC  Check iron/TIBC, B12, folate, TSH         VTE prophylaxis: Heparin

## 2020-02-18 NOTE — ED NOTES
Attempted to call report-- pt being coded upstair.  states RN would be down to get pt and bedside report following code

## 2020-02-18 NOTE — DISCHARGE PLANNING
Used Interpretor Services: Melquiades #[id number]    Patient is a 39 year old [place]-American  mother of one child (son is 13 years old). Patient lives with her , Serg and son (Evaristo) in [place], [place]. Patient has lived in the [place] for 15-years, has a green card, and reports it is too expensive to become a citizen. 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 Patient has Medicaid FFS insurance.     Patient's PCP is listed as Dr. Christy, whom patient reports she has never seen. Per chart review, patient seen by [facility] for Dialysis. Patient has dialysis hair with [facility]. Patient reports no issues with ADLs or IADLs. No home O2. [facility]acy [facility] [address]. No issues with AOD or MH.     Patient reports she needs a women's health doctor, reports pain frequently without any follow up in the last 3 years. Additionally, patient reports this is not the first time she has had chest pain, has happened in the past, but not frequently.     Patient also has noticed recently she is unable to concentrate and is forgetting things frequently. Is worried that something is wrong. SW reported she would pass this along to physician.     Patient reports no other concerns at this time besides medical issues.     Care Transition Team Assessment    Information Source  Orientation : Oriented x 4  Information Given By: Patient  Who is responsible for making decisions for patient? : Patient    Readmission Evaluation  Is this a readmission?: No    Elopement Risk  Legal Hold: No  Ambulatory or Self Mobile in Wheelchair: Yes  Disoriented: No  Psychiatric Symptoms: None  History of Wandering: No  Elopement this Admit: No  Vocalizing Wanting to Leave: No  Displays Behaviors, Body Language Wanting to Leave: No-Not at Risk for Elopement  Elopement Risk: Not at Risk for Elopement    Interdisciplinary Discharge Planning  Does Admitting Nurse Feel This Could be a Complex Discharge?: No  Primary Care Physician: no  pcp   Lives with - Patient's Self Care Capacity: Child Less than 18 Years of Age, Spouse  Patient or legal guardian wants to designate a caregiver (see row info): No  Support Systems: Family Member(s)  Housing / Facility: 1 Story House  Do You Take your Prescribed Medications Regularly: Yes  Able to Return to Previous ADL's: No  Mobility Issues: No  Prior Services: None  Assistance Needed: No  Durable Medical Equipment: Not Applicable    Discharge Preparedness  What is your plan after discharge?: Home with help  What are your discharge supports?: Child, Spouse  Prior Functional Level: Ambulatory, Independent with Activities of Daily Living, Independent with Medication Management  Difficulity with ADLs: None  Difficulity with IADLs: None    Functional Assesment  Prior Functional Level: Ambulatory, Independent with Activities of Daily Living, Independent with Medication Management    Finances  Financial Barriers to Discharge: No  Prescription Coverage: Yes    Vision / Hearing Impairment  Vision Impairment : No  Hearing Impairment : No         Advance Directive  Advance Directive?: None    Domestic Abuse  Have you ever been the victim of abuse or violence?: No  Physical Abuse or Sexual Abuse: No  Verbal Abuse or Emotional Abuse: No  Possible Abuse Reported to:: Not Applicable    Psychological Assessment  History of Substance Abuse: None  History of Psychiatric Problems: No  Non-compliant with Treatment: No  Newly Diagnosed Illness: No    Discharge Risks or Barriers  Discharge risks or barriers?: No PCP    Anticipated Discharge Information  Anticipated discharge disposition: Home  Discharge Address: [address] [place] [id number]  Discharge Contact Phone Number: [phone]

## 2020-02-18 NOTE — ED PROVIDER NOTES
ED Provider Note    Scribed for Subhash Le M.D. by Janusz Cee. 2/17/2020  10:02 PM    Primary care provider: Quinton Christy M.D.  Means of arrival: EMS  History obtained from: Patient  History limited by: None    CHIEF COMPLAINT  Chief Complaint   Patient presents with   • Chest Pressure     Pt reports having chest pressure x1.5h following dialysis. Upon EMS arrival pt was hypertensive 174/109. 12l NSR per EMS. Pt reports lethargy and dizziness associated with pressure.        HPI  Mago Farfan is a 39 y.o. female with a history of hypertension who presents to the Emergency Department for evaluation of chest pain onset approximately 2 hours ago. Patient describes the pain as a pressure like sensation and states that it started at the end of her dialysis treatment earlier today. She also endorses feeling lightheaded and fatigued which onset along side her chest pressure. When she returned home she developed a feeling on numbness in her left arm and fingers. Given these symptoms she called EMS and when they arrived she was found to be hypertensive at 174/109 and was brought here for evaluation. At this time her numbness has resolved and her chest pressure is improved as well. Patient was medicated with aspirin by EMS prior to arrival. She reports a history of this chest pressure and last experienced these symptoms one to two months ago. Patient attends dialysis Monday, Wednesday, and Friday and has been compliant with attending her appointments. She denies any rashes, fevers, nausea, vomiting, diaphoresis or shortness of breath. She denies any history of heart attacks and has never has never required a stent placement. She does note that one of her knees has acutely started to swell, however denies any history of blood clots.    REVIEW OF SYSTEMS  Pertinent positives include: chest pain, fatigue, lightheadedness, numbness (resolved), lower extremity edema. Pertinent negatives include: rashes,  "fevers, nausea, vomiting, diaphoresis, shortness of breath. See history of present illness. All other systems are negative.     PAST MEDICAL HISTORY   has a past medical history of Dialysis patient (HCC), EKG abnormality, Hypertension, and Renal disorder.    SURGICAL HISTORY   has a past surgical history that includes other cardiac surgery and primary c section.    SOCIAL HISTORY  Social History     Tobacco Use   • Smoking status: Never Smoker   • Smokeless tobacco: Never Used   Substance Use Topics   • Alcohol use: No   • Drug use: No      Social History     Substance and Sexual Activity   Drug Use No       FAMILY HISTORY  Family history reviewed. Family history not pertinent.    CURRENT MEDICATIONS  No current facility-administered medications on file prior to encounter.      Current Outpatient Medications on File Prior to Encounter   Medication Sig Dispense Refill   • metoprolol (LOPRESSOR) 50 MG Tab Take 50 mg by mouth 2 times a day.     • B Complex-C-Folic Acid (DIALYVITE PO) Take 1 Tab by mouth every day.         ALLERGIES  Allergies   Allergen Reactions   • Iodine Rash     Rash   • Labetalol Palpitations     Fast heart rate, rash, HTN   • Morphine      Pt states that she can tolerate small dose of morphine (can tolerate up to 2mg dosage).   • Pork Allergy Itching     Itchy skin       PHYSICAL EXAM  VITAL SIGNS: /103   Pulse 68   Temp 37.4 °C (99.3 °F)   Resp 18   Ht 1.651 m (5' 5\")   Wt 53.7 kg (118 lb 6.2 oz)   SpO2 100%   BMI 19.70 kg/m²     Constitutional: Alert in no apparent distress.  HENT: No signs of trauma, Bilateral external ears normal, Nose normal. Uvula midline.   Eyes: Pupils are equal and reactive, Conjunctiva normal, Non-icteric.   Neck: Normal range of motion, No tenderness, Supple, No stridor.   Lymphatic: No lymphadenopathy noted.   Cardiovascular: Regular rate and rhythm, no murmurs.   Thorax & Lungs: Normal breath sounds, No respiratory distress, No wheezing, No chest " tenderness.   Abdomen:  Soft, No tenderness, No peritoneal signs, No masses, No pulsatile masses.   Skin: Warm, Dry, No erythema, No rash.   Back: No bony tenderness, No CVA tenderness.   Extremities: Intact distal pulses, No lower extremity edema, No tenderness, No cyanosis. Palpable thrill of fistula in the left upper extremity  Musculoskeletal: Good range of motion in all major joints. No tenderness to palpation or major deformities noted.   Neurologic: Alert , Normal motor function, Normal sensory function, No focal deficits noted. Cranial nerves II through XII intact.  5 out of 5 strength x4.  Sensation intact light touch.  Normal finger-nose-finger.  Normal reflexes bilaterally.  No clonus. EOMI. PERRL.   Psychiatric: Affect normal, Judgment normal, Mood normal.       DIAGNOSTIC STUDIES / PROCEDURES    LABS  Labs Reviewed   CBC WITH DIFFERENTIAL - Abnormal; Notable for the following components:       Result Value    WBC 3.1 (*)     RBC 3.94 (*)     Hemoglobin 11.7 (*)     Hematocrit 33.8 (*)     Lymphocytes 18.80 (*)     Lymphs (Absolute) 0.59 (*)     All other components within normal limits   COMP METABOLIC PANEL - Abnormal; Notable for the following components:    Bun 32 (*)     Creatinine 6.00 (*)     All other components within normal limits   TROPONIN - Abnormal; Notable for the following components:    Troponin T 31 (*)     All other components within normal limits   PHOSPHORUS - Abnormal; Notable for the following components:    Phosphorus 2.4 (*)     All other components within normal limits   ESTIMATED GFR - Abnormal; Notable for the following components:    GFR If  10 (*)     GFR If Non  8 (*)     All other components within normal limits   TROPONIN - Abnormal; Notable for the following components:    Troponin T 29 (*)     All other components within normal limits   COMP METABOLIC PANEL - Abnormal; Notable for the following components:    Chloride 95 (*)     Glucose  114 (*)     Bun 38 (*)     Creatinine 6.85 (*)     All other components within normal limits   CBC WITH DIFFERENTIAL - Abnormal; Notable for the following components:    WBC 3.3 (*)     RBC 3.91 (*)     Hemoglobin 11.5 (*)     Hematocrit 34.8 (*)     MCHC 33.0 (*)     Neutrophils (Absolute) 1.94 (*)     Lymphs (Absolute) 0.77 (*)     All other components within normal limits   ESTIMATED GFR - Abnormal; Notable for the following components:    GFR If  8 (*)     GFR If Non  7 (*)     All other components within normal limits   MAGNESIUM      All labs reviewed by me.    EKG  12 Lead EKG interpreted by me to show:  Indication: Chest pain  Normal sinus rhythm  Rate 63  Axis: Normal  Intervals: Normal  Conduction: LVH  Normal T waves  ST Depressions in leads V5 and V6  Compared to old EKG from 5/23/18, T wave inversion is longer present in lateral leads  My impression of this EKG: No STEMI.       RADIOLOGY  DX-CHEST-PORTABLE (1 VIEW)   Final Result         1.  No acute cardiopulmonary disease.        The radiologist's interpretation of all radiological studies have been reviewed by me.    COURSE & MEDICAL DECISION MAKING  Nursing notes, VS, PMSFHx reviewed in chart.    39 y.o. female p/w chief complaint of chest pain.    10:02 PM Patient seen and examined at bedside. Discussed with the patient that on ultrasound there is a small pericardial effusion. I will plan to check her blood work as well as a chest X-Ray to further evaluate her symptoms, and she is made aware that she may need to stay in the hospital for continued observation, monitoring and any necessary interventions. She understands and agrees.    11:37 PM - Paged Hospitalist    11:43 PM - I spoke with Dr. Bone, Hospitalist, who agrees to evaluate the patient for hospitalization.    The differential diagnoses include but are not limited to:   #acute chest pain  ASA given upon arrival  EKG with ST depressions in lateral leads and  troponin mildly elevated  No evidence of STEMI at this time  Plan for admission and serial troponins and telemetry monitoring    #LUE numbness (resolved)  Unclear etiology of resolved paresthesias, if they return pt agrees to d/w hospitialist       Magnesium normal  Phosphorous elevated  Troponin elevated  CXR normal  Bedside ultrasound positive for trace/mild pericardial effusion, results d/w hospitalist    HEART SCORE: 4    DISPOSITION:  Patient will be hospitalized by Dr. Bone, Hospitalist in guarded condition.    FINAL IMPRESSION  1. Acute chest pain    2. Elevated troponin          I, Janusz Cee (Scribe), am scribing for, and in the presence of, Subhash Le M.D..    Electronically signed by: Janusz Cee (Scribe), 2/17/2020    ISubhash M.D. personally performed the services described in this documentation, as scribed by Janusz Cee in my presence, and it is both accurate and complete. C.    The note accurately reflects work and decisions made by me.  Subhash Le M.D.  2/18/2020  6:41 AM

## 2020-02-19 ENCOUNTER — PATIENT OUTREACH (OUTPATIENT)
Dept: HEALTH INFORMATION MANAGEMENT | Facility: OTHER | Age: 40
End: 2020-02-19

## 2020-02-19 LAB
ANION GAP SERPL CALC-SCNC: 12 MMOL/L (ref 0–11.9)
BASOPHILS # BLD AUTO: 0.7 % (ref 0–1.8)
BASOPHILS # BLD: 0.02 K/UL (ref 0–0.12)
BUN SERPL-MCNC: 75 MG/DL (ref 8–22)
CALCIUM SERPL-MCNC: 8.5 MG/DL (ref 8.5–10.5)
CHLORIDE SERPL-SCNC: 89 MMOL/L (ref 96–112)
CHOLEST SERPL-MCNC: 146 MG/DL (ref 100–199)
CO2 SERPL-SCNC: 27 MMOL/L (ref 20–33)
CREAT SERPL-MCNC: 9.66 MG/DL (ref 0.5–1.4)
EOSINOPHIL # BLD AUTO: 0.22 K/UL (ref 0–0.51)
EOSINOPHIL NFR BLD: 7.3 % (ref 0–6.9)
ERYTHROCYTE [DISTWIDTH] IN BLOOD BY AUTOMATED COUNT: 43 FL (ref 35.9–50)
FOLATE SERPL-MCNC: 22.2 NG/ML
GLUCOSE SERPL-MCNC: 86 MG/DL (ref 65–99)
HCT VFR BLD AUTO: 34.5 % (ref 37–47)
HDLC SERPL-MCNC: 48 MG/DL
HGB BLD-MCNC: 11.1 G/DL (ref 12–16)
IMM GRANULOCYTES # BLD AUTO: 0.01 K/UL (ref 0–0.11)
IMM GRANULOCYTES NFR BLD AUTO: 0.3 % (ref 0–0.9)
IRON SATN MFR SERPL: 73 % (ref 15–55)
IRON SERPL-MCNC: 138 UG/DL (ref 40–170)
LDLC SERPL CALC-MCNC: 81 MG/DL
LV EJECT FRACT  99904: 60
LV EJECT FRACT MOD 2C 99903: 56.31
LV EJECT FRACT MOD 4C 99902: 49.91
LV EJECT FRACT MOD BP 99901: 50.63
LYMPHOCYTES # BLD AUTO: 0.82 K/UL (ref 1–4.8)
LYMPHOCYTES NFR BLD: 27.3 % (ref 22–41)
MCH RBC QN AUTO: 29 PG (ref 27–33)
MCHC RBC AUTO-ENTMCNC: 32.2 G/DL (ref 33.6–35)
MCV RBC AUTO: 90.1 FL (ref 81.4–97.8)
MONOCYTES # BLD AUTO: 0.27 K/UL (ref 0–0.85)
MONOCYTES NFR BLD AUTO: 9 % (ref 0–13.4)
NEUTROPHILS # BLD AUTO: 1.66 K/UL (ref 2–7.15)
NEUTROPHILS NFR BLD: 55.4 % (ref 44–72)
NRBC # BLD AUTO: 0 K/UL
NRBC BLD-RTO: 0 /100 WBC
PLATELET # BLD AUTO: 156 K/UL (ref 164–446)
PMV BLD AUTO: 10.8 FL (ref 9–12.9)
POTASSIUM SERPL-SCNC: 4.2 MMOL/L (ref 3.6–5.5)
RBC # BLD AUTO: 3.83 M/UL (ref 4.2–5.4)
SODIUM SERPL-SCNC: 128 MMOL/L (ref 135–145)
TIBC SERPL-MCNC: 189 UG/DL (ref 250–450)
TRIGL SERPL-MCNC: 83 MG/DL (ref 0–149)
TSH SERPL DL<=0.005 MIU/L-ACNC: 1.25 UIU/ML (ref 0.38–5.33)
VIT B12 SERPL-MCNC: 591 PG/ML (ref 211–911)
WBC # BLD AUTO: 3 K/UL (ref 4.8–10.8)

## 2020-02-19 PROCEDURE — 85025 COMPLETE CBC W/AUTO DIFF WBC: CPT

## 2020-02-19 PROCEDURE — 84443 ASSAY THYROID STIM HORMONE: CPT

## 2020-02-19 PROCEDURE — A9270 NON-COVERED ITEM OR SERVICE: HCPCS | Performed by: HOSPITALIST

## 2020-02-19 PROCEDURE — 99226 PR SUBSEQUENT OBSERVATION CARE,LEVEL III: CPT | Performed by: FAMILY MEDICINE

## 2020-02-19 PROCEDURE — 83540 ASSAY OF IRON: CPT

## 2020-02-19 PROCEDURE — 80048 BASIC METABOLIC PNL TOTAL CA: CPT

## 2020-02-19 PROCEDURE — 80061 LIPID PANEL: CPT

## 2020-02-19 PROCEDURE — 96372 THER/PROPH/DIAG INJ SC/IM: CPT

## 2020-02-19 PROCEDURE — 82746 ASSAY OF FOLIC ACID SERUM: CPT

## 2020-02-19 PROCEDURE — 93306 TTE W/DOPPLER COMPLETE: CPT | Mod: 26 | Performed by: INTERNAL MEDICINE

## 2020-02-19 PROCEDURE — 700102 HCHG RX REV CODE 250 W/ 637 OVERRIDE(OP): Performed by: HOSPITALIST

## 2020-02-19 PROCEDURE — 700111 HCHG RX REV CODE 636 W/ 250 OVERRIDE (IP): Performed by: HOSPITALIST

## 2020-02-19 PROCEDURE — G0378 HOSPITAL OBSERVATION PER HR: HCPCS

## 2020-02-19 PROCEDURE — 99214 OFFICE O/P EST MOD 30 MIN: CPT | Performed by: INTERNAL MEDICINE

## 2020-02-19 PROCEDURE — 93306 TTE W/DOPPLER COMPLETE: CPT

## 2020-02-19 PROCEDURE — 82607 VITAMIN B-12: CPT

## 2020-02-19 PROCEDURE — 36415 COLL VENOUS BLD VENIPUNCTURE: CPT

## 2020-02-19 PROCEDURE — 83550 IRON BINDING TEST: CPT

## 2020-02-19 PROCEDURE — 90935 HEMODIALYSIS ONE EVALUATION: CPT

## 2020-02-19 RX ADMIN — HEPARIN SODIUM 5000 UNITS: 5000 INJECTION, SOLUTION INTRAVENOUS; SUBCUTANEOUS at 05:43

## 2020-02-19 RX ADMIN — METOPROLOL TARTRATE 50 MG: 50 TABLET, FILM COATED ORAL at 23:37

## 2020-02-19 RX ADMIN — HEPARIN SODIUM 5000 UNITS: 5000 INJECTION, SOLUTION INTRAVENOUS; SUBCUTANEOUS at 23:37

## 2020-02-19 RX ADMIN — ASPIRIN 325 MG: 325 TABLET, FILM COATED ORAL at 05:43

## 2020-02-19 RX ADMIN — METOPROLOL TARTRATE 50 MG: 50 TABLET, FILM COATED ORAL at 12:35

## 2020-02-19 ASSESSMENT — ENCOUNTER SYMPTOMS
FOCAL WEAKNESS: 0
PALPITATIONS: 0
DIZZINESS: 0
SHORTNESS OF BREATH: 0
WEAKNESS: 0
DIAPHORESIS: 0
SENSORY CHANGE: 0
SORE THROAT: 0
NERVOUS/ANXIOUS: 0
VOMITING: 0
DIARRHEA: 0
NAUSEA: 0
SPEECH CHANGE: 0
NECK PAIN: 0
BACK PAIN: 0
COUGH: 0
HEARTBURN: 0
BLURRED VISION: 0
SHORTNESS OF BREATH: 1
FLANK PAIN: 0
HEADACHES: 0
FEVER: 0
WHEEZING: 0
CHILLS: 0
ABDOMINAL PAIN: 0

## 2020-02-19 NOTE — PROGRESS NOTES
10:00  [facility] stated that do not have sufficient supply of medication for stress test for this morning and said they would prefer to do stress test on a non-dialysis day.  They are scheduling her for tomorrow. An early lunch tray was ordered for patient.

## 2020-02-19 NOTE — PROGRESS NOTES
VALDEZ Yeh met with pt. At bedside on 2/19/20.  CHW provided pt. With [facility] sliding scale fee and Access to Healthcare information.    Plan:  CHW will follow-up with pt. Post discharge.

## 2020-02-19 NOTE — CARE PLAN
Problem: Safety  Goal: Will remain free from injury  Outcome: PROGRESSING AS EXPECTED  Goal: Will remain free from falls  Outcome: PROGRESSING AS EXPECTED     Problem: Infection  Goal: Will remain free from infection  Outcome: PROGRESSING AS EXPECTED     Problem: Knowledge Deficit  Goal: Knowledge of the prescribed therapeutic regimen will improve  Outcome: PROGRESSING AS EXPECTED     Problem: Mobility  Goal: Risk for activity intolerance will decrease  Outcome: PROGRESSING AS EXPECTED

## 2020-02-19 NOTE — PROGRESS NOTES
[facility] Medicine Daily Progress Note    Date of Service  2/19/2020    Chief Complaint  39 y.o. female admitted 2/17/2020 with Chest pain    Hospital Course  Admitted with chest pain.    Interval Problem Update  Chest pain -  again unable to do stress test today as isotope not available  HTN - sbp 130-150     services were used in the patient's primary language of Vietnamese using CNA .    Consultants/Specialty  Nephrology    Code Status  Full    Disposition  TBD    Review of Systems  Review of Systems   Constitutional: Negative for chills, diaphoresis, fever and malaise/fatigue.   HENT: Negative for hearing loss and sore throat.    Eyes: Negative for blurred vision.   Respiratory: Positive for shortness of breath. Negative for cough and wheezing.    Cardiovascular: Negative for chest pain, palpitations and leg swelling.   Gastrointestinal: Negative for abdominal pain, diarrhea, heartburn, nausea and vomiting.   Genitourinary: Negative for dysuria, flank pain and hematuria.   Musculoskeletal: Negative for back pain and neck pain.   Skin: Negative for rash.   Neurological: Negative for dizziness, sensory change, speech change, focal weakness, weakness and headaches.   Psychiatric/Behavioral: The patient is not nervous/anxious.         Physical Exam  Temp:  [36.2 °C (97.1 °F)-37.1 °C (98.7 °F)] 36.5 °C (97.7 °F)  Pulse:  [63-73] 64  Resp:  [16-18] 18  BP: (127-155)/(83-95) 155/95  SpO2:  [96 %-100 %] 99 %    Physical Exam  HENT:      Head: Normocephalic and atraumatic.      Nose: No congestion.      Mouth/Throat:      Mouth: Mucous membranes are moist.   Eyes:      Conjunctiva/sclera: Conjunctivae normal.      Pupils: Pupils are equal, round, and reactive to light.   Neck:      Musculoskeletal: No muscular tenderness.   Cardiovascular:      Rate and Rhythm: Normal rate and regular rhythm.   Pulmonary:      Effort: Pulmonary effort is normal.      Breath sounds: Normal breath sounds.   Abdominal:       General: Bowel sounds are normal. There is no distension.      Palpations: Abdomen is soft.      Tenderness: There is no abdominal tenderness. There is no guarding or rebound.   Musculoskeletal:      Right lower leg: No edema.      Left lower leg: No edema.   Lymphadenopathy:      Cervical: No cervical adenopathy.   Skin:     General: Skin is warm and dry.   Neurological:      General: No focal deficit present.      Mental Status: She is alert and oriented to person, place, and time.      Cranial Nerves: No cranial nerve deficit.         Fluids  No intake or output data in the 24 hours ending 02/19/20 1449    Laboratory  Recent Labs     02/17/20 2242 02/18/20  0351 02/19/20  0424   WBC 3.1* 3.3* 3.0*   RBC 3.94* 3.91* 3.83*   HEMOGLOBIN 11.7* 11.5* 11.1*   HEMATOCRIT 33.8* 34.8* 34.5*   MCV 85.8 89.0 90.1   MCH 29.7 29.4 29.0   MCHC 34.6 33.0* 32.2*   RDW 39.8 42.7 43.0   PLATELETCT 165 167 156*   MPV 10.5 10.8 10.8     Recent Labs     02/17/20 2242 02/18/20  0350 02/19/20  0424   SODIUM 136 137 128*   POTASSIUM 3.7 4.2 4.2   CHLORIDE 96 95* 89*   CO2 29 31 27   GLUCOSE 96 114* 86   BUN 32* 38* 75*   CREATININE 6.00* 6.85* 9.66*   CALCIUM 9.2 8.8 8.5             Recent Labs     02/19/20  0424   TRIGLYCERIDE 83   HDL 48   LDL 81       Imaging  EC-ECHOCARDIOGRAM COMPLETE W/O CONT   Final Result      DX-CHEST-PORTABLE (1 VIEW)   Final Result         1.  No acute cardiopulmonary disease.      NM-CARDIAC STRESS TEST    (Results Pending)        Assessment/Plan  * Pain in the chest- (present on admission)  Assessment & Plan  ASA, metoprolol  For stress test - unable to be done today as Isotope not available      History of CVA (cerebrovascular accident)- (present on admission)  Assessment & Plan  ASA    Pulmonary hypertension (HCC)- (present on admission)  Assessment & Plan  Watch fluid status    Essential hypertension- (present on admission)  Assessment & Plan  Metoprolol  IV hydralazine as needed with  parameters      Elevated troponin- (present on admission)  Assessment & Plan  ASA  For stress test    ESRD (end stage renal disease) on dialysis (HCC)- (present on admission)  Assessment & Plan  HD per Nephrology    Anemia of chronic disease- (present on admission)  Assessment & Plan  Follow CBC    Leukopenia- (present on admission)  Assessment & Plan  Follow CBC       VTE prophylaxis: Heparin

## 2020-02-19 NOTE — PROGRESS NOTES
Nephrology Daily Progress Note    Date of Service  2/19/2020    Chief Complaint  39 y.o. female with history of ESRD on hemodialysis Monday Wednesday Friday at [facility] who presented 2/17/2020 with chest pain.    Interval Problem Update  2/19 - no new complaints today.  Denies chest pain, shortness of breath at rest.  Worried that she might experience chest pain if she exerts herself.     services were used in the patient's primary language of Nicaraguan.  Mode of interpretation: Telephone       Review of Systems  Review of Systems   Constitutional: Negative for fever.   Respiratory: Negative for shortness of breath.    Cardiovascular: Negative for chest pain.   Gastrointestinal: Negative for abdominal pain.   All other systems reviewed and are negative.       Physical Exam  Temp:  [36.2 °C (97.1 °F)-37.1 °C (98.7 °F)] 36.5 °C (97.7 °F)  Pulse:  [63-73] 64  Resp:  [16-18] 18  BP: (127-155)/(83-95) 155/95  SpO2:  [96 %-100 %] 99 %    Physical Exam   Constitutional: She is oriented to person, place, and time. She appears well-developed. No distress.   HENT:   Mouth/Throat: Oropharynx is clear and moist. No oropharyngeal exudate.   Eyes: EOM are normal. No scleral icterus.   Neck: No tracheal deviation present.   Cardiovascular: Normal heart sounds.   No murmur heard.  Pulmonary/Chest: Effort normal. No stridor. She has no rales.   Abdominal: Soft. There is no abdominal tenderness.   Musculoskeletal: Normal range of motion.         General: No edema.   Neurological: She is alert and oriented to person, place, and time.   Skin: Skin is warm and dry.   Psychiatric: She has a normal mood and affect.   Access: Left radiocephalic AV fistula, with patent bruit and thrill      Fluids  No intake or output data in the 24 hours ending 02/19/20 1318    Laboratory  Labs reviewed, pertinent labs below.  Recent Labs     02/17/20  2242 02/18/20  0351 02/19/20  0424   WBC 3.1* 3.3* 3.0*   RBC 3.94* 3.91* 3.83*    HEMOGLOBIN 11.7* 11.5* 11.1*   HEMATOCRIT 33.8* 34.8* 34.5*   MCV 85.8 89.0 90.1   MCH 29.7 29.4 29.0   MCHC 34.6 33.0* 32.2*   RDW 39.8 42.7 43.0   PLATELETCT 165 167 156*   MPV 10.5 10.8 10.8     Recent Labs     02/17/20  2242 02/18/20  0350 02/19/20  0424   SODIUM 136 137 128*   POTASSIUM 3.7 4.2 4.2   CHLORIDE 96 95* 89*   CO2 29 31 27   GLUCOSE 96 114* 86   BUN 32* 38* 75*   CREATININE 6.00* 6.85* 9.66*   CALCIUM 9.2 8.8 8.5               URINALYSIS:  Lab Results   Component Value Date/Time    COLORURINE Lt. Yellow 01/14/2015 0050    CLARITY Sl Cloudy (A) 01/14/2015 0050    SPECGRAVITY 1.009 01/14/2015 0050    PHURINE 6.0 01/14/2015 0050    KETONES Negative 01/14/2015 0050    PROTEINURIN 200 (A) 01/14/2015 0050    BILIRUBINUR Negative 01/14/2015 0050    NITRITE Negative 01/14/2015 0050    LEUKESTERAS Negative 01/14/2015 0050    OCCULTBLOOD Trace (A) 01/14/2015 0050     [facility]  Lab Results   Component Value Date/Time    TOTPROTUR 53.0 (H) 05/05/2008 0730      Lab Results   Component Value Date/Time    CREATININEU 39 08/08/2007 1215         Imaging reviewed  EC-ECHOCARDIOGRAM COMPLETE W/O CONT   Final Result      DX-CHEST-PORTABLE (1 VIEW)   Final Result         1.  No acute cardiopulmonary disease.      NM-CARDIAC STRESS TEST    (Results Pending)         Current Facility-Administered Medications   Medication Dose Route Frequency Provider Last Rate Last Dose   • hydrALAZINE (APRESOLINE) injection 10 mg  10 mg Intravenous Q6HRS PRN Jimmie Blunt M.D.       • NS (BOLUS) infusion 250 mL  250 mL Intravenous DIALYSIS PRN Jessee Shaw M.D.       • albumin human 25% solution 12.5 g  12.5 g Intravenous DIALYSIS PRN Jessee Shaw M.D.       • lidocaine (XYLOCAINE) 1 % injection 1 mL  1 mL Intradermal PRN Jessee Shaw M.D.       • metoprolol (LOPRESSOR) tablet 50 mg  50 mg Oral BID Melina Bone M.D.   50 mg at 02/19/20 1235   • aspirin (ASA) tablet 325 mg  325 mg Oral DAILY Melina Bone M.D.   325 mg at 02/19/20  0543    Or   • aspirin (ASA) chewable tab 324 mg  324 mg Oral DAILY Melina Bone M.D.        Or   • aspirin (ASA) suppository 300 mg  300 mg Rectal DAILY Melina Bone M.D.       • senna-docusate (PERICOLACE or SENOKOT S) 8.6-50 MG per tablet 2 Tab  2 Tab Oral BID Melina Bone M.D.        And   • polyethylene glycol/lytes (MIRALAX) PACKET 1 Packet  1 Packet Oral QDAY PRN Melina Bone M.D.        And   • magnesium hydroxide (MILK OF MAGNESIA) suspension 30 mL  30 mL Oral QDAY PRN Melina Bone M.D.        And   • bisacodyl (DULCOLAX) suppository 10 mg  10 mg Rectal QDAY PRN Melina Bone M.D.       • heparin injection 5,000 Units  5,000 Units Subcutaneous Q8HRS Melina Bone M.D.   5,000 Units at 02/19/20 0543   • ondansetron (ZOFRAN) syringe/vial injection 4 mg  4 mg Intravenous Q4HRS PRJEANNETTE Bone M.D.       • ondansetron (ZOFRAN ODT) dispertab 4 mg  4 mg Oral Q4HRS PRN Melina Bone M.D.       • promethazine (PHENERGAN) tablet 12.5-25 mg  12.5-25 mg Oral Q4HRS PRJEANNETTE Bone M.D.       • promethazine (PHENERGAN) suppository 12.5-25 mg  12.5-25 mg Rectal Q4HRS PRN Melina Bone M.D.       • prochlorperazine (COMPAZINE) injection 5-10 mg  5-10 mg Intravenous Q4HRS PRJEANNETTE Bone M.D.             Assessment/Plan  39 y.o. female with history of ESRD on hemodialysis Monday Wednesday Friday at [facility] who presented 2/17/2020 with chest pain.     1.  ESRD on hemodialysis Monday Wednesday Friday.    Anuric.  Plan for dialysis today per usual schedule.  Check labs daily.     2.  Access: Left radiocephalic AV fistula, stable.  Avoid blood pressure checks, IVs, and lab draws in the left arm.     3.  Chest pain, unclear etiology, cardiac stress test pending.  Patient is at risk of coronary artery disease with history of ESRD.     4.  Itching, unclear etiology.  Phosphorus on the low side.  Patient takes Tums 1-2 tabs with meals as a phos binder as an  outpatient.     5.  Anemia of chronic disease, slightly worsening.  No acute need for Epogen with hemoglobin over 11.  Check CBC daily.     6.  Leukopenia, slightly worsening.  Unclear etiology.  Check CBC daily.     7.  Hypertension, mildly uncontrolled.  Plan for ultrafiltration with dialysis as tolerated.  Low-salt diet.      Jessee Shaw MD  Nephrology

## 2020-02-20 ENCOUNTER — APPOINTMENT (OUTPATIENT)
Dept: RADIOLOGY | Facility: MEDICAL CENTER | Age: 40
End: 2020-02-20
Attending: FAMILY MEDICINE
Payer: MEDICAID

## 2020-02-20 ENCOUNTER — PATIENT OUTREACH (OUTPATIENT)
Dept: HEALTH INFORMATION MANAGEMENT | Facility: OTHER | Age: 40
End: 2020-02-20

## 2020-02-20 VITALS
OXYGEN SATURATION: 99 % | TEMPERATURE: 99.3 F | HEART RATE: 66 BPM | WEIGHT: 132.5 LBS | RESPIRATION RATE: 18 BRPM | BODY MASS INDEX: 22.08 KG/M2 | DIASTOLIC BLOOD PRESSURE: 79 MMHG | HEIGHT: 65 IN | SYSTOLIC BLOOD PRESSURE: 122 MMHG

## 2020-02-20 LAB
ALBUMIN SERPL BCP-MCNC: 4.9 G/DL (ref 3.2–4.9)
ALBUMIN/GLOB SERPL: 1.5 G/DL
ALP SERPL-CCNC: 87 U/L (ref 30–99)
ALT SERPL-CCNC: 13 U/L (ref 2–50)
ANION GAP SERPL CALC-SCNC: 12 MMOL/L (ref 0–11.9)
AST SERPL-CCNC: 14 U/L (ref 12–45)
B-HCG SERPL-ACNC: 2.5 MIU/ML (ref 0–5)
BASOPHILS # BLD AUTO: 1.1 % (ref 0–1.8)
BASOPHILS # BLD: 0.03 K/UL (ref 0–0.12)
BILIRUB SERPL-MCNC: 0.6 MG/DL (ref 0.1–1.5)
BUN SERPL-MCNC: 44 MG/DL (ref 8–22)
CALCIUM SERPL-MCNC: 9.3 MG/DL (ref 8.5–10.5)
CHLORIDE SERPL-SCNC: 91 MMOL/L (ref 96–112)
CO2 SERPL-SCNC: 29 MMOL/L (ref 20–33)
CREAT SERPL-MCNC: 8.13 MG/DL (ref 0.5–1.4)
EOSINOPHIL # BLD AUTO: 0.15 K/UL (ref 0–0.51)
EOSINOPHIL NFR BLD: 5.3 % (ref 0–6.9)
ERYTHROCYTE [DISTWIDTH] IN BLOOD BY AUTOMATED COUNT: 43.1 FL (ref 35.9–50)
GLOBULIN SER CALC-MCNC: 3.2 G/DL (ref 1.9–3.5)
GLUCOSE SERPL-MCNC: 90 MG/DL (ref 65–99)
HCT VFR BLD AUTO: 41.4 % (ref 37–47)
HGB BLD-MCNC: 13.8 G/DL (ref 12–16)
IMM GRANULOCYTES # BLD AUTO: 0.01 K/UL (ref 0–0.11)
IMM GRANULOCYTES NFR BLD AUTO: 0.4 % (ref 0–0.9)
LYMPHOCYTES # BLD AUTO: 0.49 K/UL (ref 1–4.8)
LYMPHOCYTES NFR BLD: 17.3 % (ref 22–41)
MCH RBC QN AUTO: 29.8 PG (ref 27–33)
MCHC RBC AUTO-ENTMCNC: 33.2 G/DL (ref 33.6–35)
MCV RBC AUTO: 89.8 FL (ref 81.4–97.8)
MONOCYTES # BLD AUTO: 0.27 K/UL (ref 0–0.85)
MONOCYTES NFR BLD AUTO: 9.5 % (ref 0–13.4)
NEUTROPHILS # BLD AUTO: 1.88 K/UL (ref 2–7.15)
NEUTROPHILS NFR BLD: 66.4 % (ref 44–72)
NRBC # BLD AUTO: 0 K/UL
NRBC BLD-RTO: 0 /100 WBC
PLATELET # BLD AUTO: 158 K/UL (ref 164–446)
PMV BLD AUTO: 10.7 FL (ref 9–12.9)
POTASSIUM SERPL-SCNC: 4.6 MMOL/L (ref 3.6–5.5)
PROT SERPL-MCNC: 8.1 G/DL (ref 6–8.2)
RBC # BLD AUTO: 4.6 M/UL (ref 4.2–5.4)
SODIUM SERPL-SCNC: 132 MMOL/L (ref 135–145)
WBC # BLD AUTO: 2.8 K/UL (ref 4.8–10.8)

## 2020-02-20 PROCEDURE — 80053 COMPREHEN METABOLIC PANEL: CPT

## 2020-02-20 PROCEDURE — 99217 PR OBSERVATION CARE DISCHARGE: CPT | Performed by: FAMILY MEDICINE

## 2020-02-20 PROCEDURE — 700111 HCHG RX REV CODE 636 W/ 250 OVERRIDE (IP): Performed by: HOSPITALIST

## 2020-02-20 PROCEDURE — 76705 ECHO EXAM OF ABDOMEN: CPT

## 2020-02-20 PROCEDURE — 700102 HCHG RX REV CODE 250 W/ 637 OVERRIDE(OP): Performed by: HOSPITALIST

## 2020-02-20 PROCEDURE — A9270 NON-COVERED ITEM OR SERVICE: HCPCS | Performed by: HOSPITALIST

## 2020-02-20 PROCEDURE — G0378 HOSPITAL OBSERVATION PER HR: HCPCS

## 2020-02-20 PROCEDURE — 84702 CHORIONIC GONADOTROPIN TEST: CPT

## 2020-02-20 PROCEDURE — 85025 COMPLETE CBC W/AUTO DIFF WBC: CPT

## 2020-02-20 PROCEDURE — 96372 THER/PROPH/DIAG INJ SC/IM: CPT

## 2020-02-20 PROCEDURE — 36415 COLL VENOUS BLD VENIPUNCTURE: CPT

## 2020-02-20 RX ADMIN — ASPIRIN 325 MG: 325 TABLET, FILM COATED ORAL at 05:40

## 2020-02-20 RX ADMIN — METOPROLOL TARTRATE 50 MG: 50 TABLET, FILM COATED ORAL at 12:02

## 2020-02-20 RX ADMIN — HEPARIN SODIUM 5000 UNITS: 5000 INJECTION, SOLUTION INTRAVENOUS; SUBCUTANEOUS at 05:40

## 2020-02-20 NOTE — PROGRESS NOTES
Discharge instructions give to patient at bedside. Pt verbalizes understanding and states plans for follow up. New and home medications reviewed, post discharge activity level and worsening of symptoms needing follow up care discussed. Telemetry monitor and IV cathlon removed. All belongings accounted for, all questions answered at this time. Pt is waiting for her  who will be her ride home.

## 2020-02-20 NOTE — PROGRESS NOTES
Received report and assumed care of patient. Patient is alert and oriented x4 Niuean speaking. Patient is in no signs of distress denies pain at this time. Family at bedside. Patient was updated on the plan of care for the day. Call light within reach, bed in low position, 2 side rails up. Educated on fall risk, verbalizes understanding. Will continue to monitor.

## 2020-02-20 NOTE — PROGRESS NOTES
Pt resting in bed at this time and has returned from stress test, nuc med called and informed this RN that pt had refused the isotope and thus stress test could not be performed. Dr. Marley has been informed and will come speak to patient.

## 2020-02-20 NOTE — PROGRESS NOTES
3hr HD started @ 1530 and completed @ 1832,tx well tolerated,VSS,net UF = 2000ml.LFAAVF + B/T,cannulation sites covered with DD,CDI,report given to DEMARIO Orellana RN

## 2020-02-20 NOTE — CARE PLAN
Problem: Infection  Goal: Will remain free from infection  Note: Pt and family educated on the importance of hand hygiene and available hand  found outside of every pt room.     Problem: Knowledge Deficit  Goal: Knowledge of disease process/condition, treatment plan, diagnostic tests, and medications will improve  Note: Pt educated about disease process. Reason why medications are taken. And informed about treatment plan.

## 2020-02-20 NOTE — PROGRESS NOTES
Assumed care of PT A&O 4. Pt resting in bed with no signs of labored breathing. On RA. Tele monitor in place, cardiac rhythm being monitored. Call light within reach, bed in lowest position, upper bed rails up. Pt was updated on plan of care for the day. Will continue to monitor. NPO for stress test today.

## 2020-02-20 NOTE — DISCHARGE SUMMARY
Discharge Summary    CHIEF COMPLAINT ON ADMISSION  Chief Complaint   Patient presents with   • Chest Pressure     Pt reports having chest pressure x1.5h following dialysis. Upon EMS arrival pt was hypertensive 174/109. 12l NSR per EMS. Pt reports lethargy and dizziness associated with pressure.        Reason for Admission  EMS     Admission Date  2/17/2020    CODE STATUS  Full Code    HPI & HOSPITAL COURSE  This is a 39 y.o. female here with chest pain.  She has known history of lupus nephritis, end-stage renal disease on hemodialysis.  Serial troponins were mildly elevated which trended down.  D-dimer was only slightly elevated.  Nephrology was consulted the case for her hemodialysis.  Echocardiogram was done which did not show any significant findings.  Stress test was ordered, however when patient came down for the test, she refused to have the test done because of concerns with the dye.  She has had a previous allergic reaction to iodine contrast.  Even after lengthy education and counseling that the medication was different from iodine contrast she still refused to undergo the test.  She fully understands the risks and complications.       Therefore, she is discharged in good and stable condition to home with close outpatient follow-up.    The patient recovered much more quickly than anticipated on admission.    Discharge Date  2/20/2020    FOLLOW UP ITEMS POST DISCHARGE  Follow-up with PCP    DISCHARGE DIAGNOSES  Principal Problem:    Pain in the chest POA: Yes  Active Problems:    Leukopenia POA: Yes    Anemia of chronic disease POA: Yes    ESRD (end stage renal disease) on dialysis (HCC) POA: Yes      Overview: HD this morning for recurrent hyperkalemia    Elevated troponin POA: Yes    Essential hypertension POA: Yes    Pulmonary hypertension (HCC) POA: Yes    History of CVA (cerebrovascular accident) POA: Yes  Resolved Problems:    * No resolved hospital problems. *      FOLLOW UP  No future  appointments.  [facility]  [address] [place] [address]  [phone]  Call in 1 day  Patient will need to call [facility] and establish primary care at [phone]. Thank you,      MEDICATIONS ON DISCHARGE     Medication List      CONTINUE taking these medications      Instructions   DIALYVITE PO   Take 1 Tab by mouth every day.  Dose:  1 Tab     metoprolol 50 MG Tabs  Commonly known as:  LOPRESSOR   Take 50 mg by mouth 2 times a day.  Dose:  50 mg            Allergies  Allergies   Allergen Reactions   • Iodine Rash     Rash   • Labetalol Palpitations     Fast heart rate, rash, HTN   • Morphine      Pt states that she can tolerate small dose of morphine (can tolerate up to 2mg dosage).   • Pork Allergy Itching     Itchy skin       DIET  Orders Placed This Encounter   Procedures   • Diet Order Renal     Standing Status:   Standing     Number of Occurrences:   1     Order Specific Question:   Diet:     Answer:   Renal [8]       ACTIVITY  As tolerated.  Weight bearing as tolerated    CONSULTATIONS  Nephrology - Safdi    PROCEDURES  None    LABORATORY  Lab Results   Component Value Date    SODIUM 132 (L) 02/20/2020    POTASSIUM 4.6 02/20/2020    CHLORIDE 91 (L) 02/20/2020    CO2 29 02/20/2020    GLUCOSE 90 02/20/2020    BUN 44 (H) 02/20/2020    CREATININE 8.13 (HH) 02/20/2020    CREATININE 3.0 (H) 10/14/2008        Lab Results   Component Value Date    WBC 2.8 (L) 02/20/2020    HEMOGLOBIN 13.8 02/20/2020    HEMATOCRIT 41.4 02/20/2020    PLATELETCT 158 (L) 02/20/2020        Total time of the discharge process exceeds 30 minutes.

## 2020-02-20 NOTE — DISCHARGE PLANNING
SW called [facility] scheduling to inquire about PCP. Patient was a no call no show for 2 appointments at [facility] so she is no longer able to be seen at [facility].     Scheduling will put in referrals for [facility] and [facility] in D/C instructions.     Patient will need to follow up with community provider for new PCP and for women's health.

## 2020-02-20 NOTE — DISCHARGE INSTRUCTIONS
Discharge Instructions    Discharged to home by car with relative. Discharged via walking, hospital escort: Refused.  Special equipment needed: Not Applicable    Be sure to schedule a follow-up appointment with your primary care doctor or any specialists as instructed.     Discharge Plan:   Diet Plan: Discussed  Activity Level: Discussed  Confirmed Follow up Appointment: Patient to Call and Schedule Appointment  Confirmed Symptoms Management: Discussed  Medication Reconciliation Updated: Yes  Influenza Vaccine Indication: Not indicated: Previously immunized this influenza season and > 8 years of age    I understand that a diet low in cholesterol, fat, and sodium is recommended for good health. Unless I have been given specific instructions below for another diet, I accept this instruction as my diet prescription.   Other diet:     Special Instructions: None    · Is patient discharged on Warfarin / Coumadin?   No       Dolor de pecho inespecífico  (Nonspecific Chest Pain)  El dolor de pecho puede deberse a muchas enfermedades diferentes. Siempre existe amisha posibilidad de que el dolor esté relacionado con algo grave, daja un infarto de miocardio o un coágulo sanguíneo en los pulmones. Hay muchas enfermedades que no son potencialmente mortales que pueden causar dolor de pecho. Si tiene dolor de pecho, es muy importante que se controle con el médico.  CAUSAS  Las causas del dolor de pecho pueden ser las siguientes:  · Acidez estomacal.  · Neumonía o bronquitis.  · Ansiedad o estrés.  · Inflamación de la kelsea que rodea al corazón (pericarditis) o a los pulmones (pleuritis o pleuresía).  · Un coágulo sanguíneo en el pulmón.  · Colapso de un pulmón (neumotórax), que puede aparecer de manera repentina por sí solo (neumotórax espontáneo) o debido a un traumatismo en el tórax.  · Culebrilla (virus de la varicela zóster).  · Infarto de miocardio.  · Daño de los huesos, los músculos y los cartílagos que conforman la pared  torácica. [place] puede incluir lo siguiente:  ¨ Hematomas óseos debido a lesiones.  ¨ Distensiones musculares o de los cartílagos por tos frecuente o repetida, o por exceso de trabajo.  ¨ Fractura de amisha o más costillas.  ¨ Dolor de cartílago debido a inflamación (costocondritis).  FACTORES DE RIESGO  Los factores de riesgo de tener dolor de pecho pueden incluir lo siguiente:  · Actividades que incrementan el riesgo de sufrir traumatismos o lesiones en el tórax.  · Infecciones o enfermedades respiratorias que causan tos frecuente.  · Enfermedades o excesos en las comidas que pueden causar acidez.  · Enfermedades cardíacas o antecedentes familiares de enfermedades cardíacas.  · Enfermedades o comportamientos de nathanael que aumentan el riesgo de tener un coágulo sanguíneo.  · Wilner tenido varicela (varicela zóster).  SIGNOS Y SÍNTOMAS  El dolor de pecho puede provocar las siguientes sensaciones:  · Ardor u hormigueo en la superficie o en lo profundo del pecho.  · Dolor opresivo, continuo o constrictivo.  · Dolor vago o intenso que empeora al moverse, toser o inhalar profundamente.  · Dolor que también se siente en la espalda, el daya, el hombro o el brazo, o dolor que se irradia a cualquiera de estas zonas.  El dolor de pecho puede aparecer y desaparecer, o anirudh puede ser yadiel.  DIAGNÓSTICO  Quizás se necesiten análisis de laboratorio u otros estudios para encontrar la causa del dolor. El médico puede indicarle que se krishna amisha prueba llamada EGC (electrocadiograma) ambulatorio. El electrocardiograma registra los patrones de los latidos cardíacos en el momento en que se realiza el estudio. También pueden hacerle otros estudios, por ejemplo:  · Ecocardiograma transtorácico (ETT). Kary el ecocardiograma, se usan ondas sonoras para crear amisha imagen de todas las estructuras cardíacas y evaluar cómo circula la tavo por el corazón.  · Ecocardiograma transesofágico (ETE). Sarah es un estudio de diagnóstico por imágenes  más avanzado que el obtiene imágenes del interior del cuerpo. Le permite al médico alvaro el corazón con mayor detalle.  · Monitoreo cardíaco. Permite que el médico controle la frecuencia y el ritmo cardíaco en tiempo real.  · Monitor Holter. Es un dispositivo portátil que registra los latidos del corazón y puede ayudar a diagnosticar las arritmias cardíacas. Le permite al médico registrar la actividad cardíaca leidy varios días, si es necesario.  · Pruebas de esfuerzo. Estas pueden realizarse leidy el ejercicio o mediante la administración de un medicamento que acelera los latidos del corazón.  · Análisis de tavo.  · Diagnóstico por imágenes.  TRATAMIENTO  El tratamiento depende de la causa del dolor de pecho. El tratamiento puede incluir lo siguiente:  · Medicamentos. Estos pueden incluir lo siguiente:  ¨ Inhibidores de la acidez estomacal.  ¨ Antiinflamatorios.  ¨ Analgésicos para las enfermedades inflamatorias.  ¨ Antibióticos, si hay amisha infección.  ¨ Medicamentos para disolver los coágulos sanguíneos.  ¨ Medicamentos para tratar la enfermedad arterial coronaria.  · Tratamiento complementario para las enfermedades que no requieren la coral de medicamentos. [place] puede incluir lo siguiente:  ¨ Descansar.  ¨ Aplicar compresas frías o calientes en las zonas lesionadas.  ¨ Limitar las actividades hasta que disminuya el dolor.  INSTRUCCIONES PARA EL CUIDADO EN EL HOGAR  · Si le recetaron antibióticos, asegúrese de terminarlos, incluso si comienza a sentirse mejor.  · Evite las actividades que le causen dolor de pecho.  · No consuma ningún producto que contenga tabaco, lo que incluye cigarrillos, tabaco de mascar o cigarrillos electrónicos. Si necesita ayuda para dejar de fumar, consulte al médico.  · No odalis alcohol.  · [place] los medicamentos solamente daja se lo haya indicado el médico.  · Concurra a todas las visitas de control daja se lo haya indicado el médico. [place] es importante. [place] incluye otros estudios si  el dolor de pecho no desaparece.  · Si la acidez es la causa del dolor de pecho, rodríguez vez le aconsejen que mantenga la haleigh levantada (elevada) mientras duerme. [place] reduce la probabilidad de que el ácido retroceda del estómago al esófago.  · Dc cambios en sher estilo de randy daja se lo haya indicado el médico. Estos pueden incluir lo siguiente:  ¨ Practicar actividad física con regularidad. Pida al médico que le sugiera algunas actividades que karishma seguras para usted.  ¨ Consumir amisha dieta cardiosaludable. Un nutricionista matriculado puede ayudarlo a hacer elecciones saludables.  ¨ Mantener un peso saludable.  ¨ Controlar la diabetes, si es necesario.  ¨ Reducir las situaciones de estrés.  SOLICITE ATENCIÓN MÉDICA SI:  · El dolor de pecho no desaparece después del tratamiento.  · Tiene amisha erupción cutánea con ampollas en el pecho.  · Tiene fiebre.  SOLICITE ATENCIÓN MÉDICA DE INMEDIATO SI:  · El dolor en el pecho es más intenso.  · La tos empeora, o expectora tavo.  · Siente un dolor abdominal intenso.  · Siente debilidad intensa.  · Se desmaya.  · Tiene escalofríos.  · Tiene amisha molestia repentina e inexplicable en el pecho.  · Tiene molestias repentinas e inexplicables en los brazos, la espalda, el daya o la mandíbula.  · Le falta el aire en cualquier momento.  · Comienza a sudar de manera repentina o la piel se le humedece.  · Siente náuseas o vomita.  · Se siente repentinamente mareado o se desmaya.  · Siente que el corazón comienza a latir rápidamente o que se saltea latidos.  Estos síntomas pueden representar un problema grave que constituye amisha emergencia. No espere hasta que los síntomas desaparezcan. Solicite atención médica de inmediato. Comuníquese con el servicio de emergencias de sher localidad (911 en los [place]). No conduzca por paxton propios medios hasta el hospital.   Esta información no tiene daja fin reemplazar el consejo del médico. Asegúrese de hacerle al médico cualquier pregunta  roldan hooper.  Document Released: 12/18/2006 Document Revised: 01/08/2016 Document Reviewed: 06/26/2017  [employer] Interactive Patient Education © 2017 [employer]      Depression / Suicide Risk    As you are discharged from this [facility] facility, it is important to learn how to keep safe from harming yourself.    Recognize the warning signs:  · Abrupt changes in personality, positive or negative- including increase in energy   · Giving away possessions  · Change in eating patterns- significant weight changes-  positive or negative  · Change in sleeping patterns- unable to sleep or sleeping all the time   · Unwillingness or inability to communicate  · Depression  · Unusual sadness, discouragement and loneliness  · Talk of wanting to die  · Neglect of personal appearance   · Rebelliousness- reckless behavior  · Withdrawal from people/activities they love  · Confusion- inability to concentrate     If you or a loved one observes any of these behaviors or has concerns about self-harm, here's what you can do:  · Talk about it- your feelings and reasons for harming yourself  · Remove any means that you might use to hurt yourself (examples: pills, rope, extension cords, firearm)  · Get professional help from the community (Mental Health, Substance Abuse, psychological counseling)  · Do not be alone:Call your Safe Contact- someone whom you trust who will be there for you.  · Call your local CRISIS HOTLINE 238-4779 or 191-639-3213  · Call your local Children's Mobile Crisis Response Team [place] (396) 089-5308 or www.Xplornet  · Call the toll free National Suicide Prevention Hotlines   · National Suicide Prevention Lifeline 442-979-NPKI (8021)  · National Hope Line Network 800-SUICIDE (084-3688)

## 2020-02-26 ENCOUNTER — PATIENT OUTREACH (OUTPATIENT)
Dept: HEALTH INFORMATION MANAGEMENT | Facility: OTHER | Age: 40
End: 2020-02-26

## 2020-03-05 ENCOUNTER — PATIENT OUTREACH (OUTPATIENT)
Dept: HEALTH INFORMATION MANAGEMENT | Facility: OTHER | Age: 40
End: 2020-03-05

## 2020-03-05 NOTE — PROGRESS NOTES
VALDEZ Yeh completed outpatient visit with pt. 3/2/20 at [address] [place] [address] In Patient room.  Pt. was provided a food-is-medicine prescription and food bags from [employer] food pantry.  CHELEW encouraged pt. To consider making an appointment with [facility] for a PCP. CHW also reminded pt. About Access to Healthcare.  CHELEW provided pt. With CARE Chest information and application. Pt. Stated she would complete application at home.   Pt. Requested RN [place] for more information on diet and diabetes.  Pt. Spoke with MING Whitney in regards to prior diagnosis concerns, please see her note for details.    Plan:  VALDEZ Yeh will route pt. Chart to KELLI Mauricio.   VALDEZ will TC pt. In regards to CARE Chest application.

## 2020-03-06 ENCOUNTER — PATIENT OUTREACH (OUTPATIENT)
Dept: HEALTH INFORMATION MANAGEMENT | Facility: OTHER | Age: 40
End: 2020-03-06

## 2020-03-17 RX ORDER — METOPROLOL TARTRATE 50 MG/1
TABLET, FILM COATED ORAL
Qty: 180 TAB | Refills: 1 | Status: SHIPPED | OUTPATIENT
Start: 2020-03-17 | End: 2020-10-23

## 2020-03-19 NOTE — PROGRESS NOTES
Pt. Inbound TC to [facility] for VALDEZ Yeh 3/19/20.  Pt. States that she may be eligible for health insurance through her husbands employer. Pt. States that she will inform VALDEZ Yeh upon approval response.   Pt. States she would like to connect with [facility] KELLI Mauricio over health concerns. VALDEZ Yeh scheduled an outpatient TC with KELLI Mauricio and pt.  VALDEZ Yeh will interpret this encounter.   Pt. Currently presents no other needs or concerns at this time.    Plan:  VALDEZ Yeh will interpret for KELLI Mauricio and pt encounter 3/20/20.

## 2020-03-20 ENCOUNTER — PATIENT OUTREACH (OUTPATIENT)
Dept: HEALTH INFORMATION MANAGEMENT | Facility: OTHER | Age: 40
End: 2020-03-20

## 2020-04-14 ENCOUNTER — PATIENT OUTREACH (OUTPATIENT)
Dept: HEALTH INFORMATION MANAGEMENT | Facility: OTHER | Age: 40
End: 2020-04-14

## 2020-04-17 ENCOUNTER — PATIENT OUTREACH (OUTPATIENT)
Dept: HEALTH INFORMATION MANAGEMENT | Facility: OTHER | Age: 40
End: 2020-04-17

## 2020-04-17 NOTE — PROGRESS NOTES
VALDEZ Yeh outbound conference TC with KELLI Mauricio to pt. 4/17/20.  After interpreting for KELLI Mauricio, CHW encouraged pt. To TC CCM if things change since she reports no other needs or concerns at this time.    Outcome:  Pt. Needs and concerns have been met and will be d/c'ed from CHW list.

## 2020-08-19 DIAGNOSIS — G45.9 TIA (TRANSIENT ISCHEMIC ATTACK): ICD-10-CM

## 2020-08-19 NOTE — PROGRESS NOTES
ESRD on HD, stable. But experiencing occasional unilateral arm weakness. Worried about TIA. Will start atorvastatin 40mg po daily and aspirin 81mg daily and refer to neurology.

## 2021-01-22 ENCOUNTER — APPOINTMENT (OUTPATIENT)
Dept: RADIOLOGY | Facility: MEDICAL CENTER | Age: 41
End: 2021-01-22
Attending: EMERGENCY MEDICINE
Payer: MEDICAID

## 2021-01-22 ENCOUNTER — HOSPITAL ENCOUNTER (OUTPATIENT)
Facility: MEDICAL CENTER | Age: 41
End: 2021-01-25
Attending: EMERGENCY MEDICINE | Admitting: STUDENT IN AN ORGANIZED HEALTH CARE EDUCATION/TRAINING PROGRAM
Payer: MEDICAID

## 2021-01-22 DIAGNOSIS — N18.6 ESRD (END STAGE RENAL DISEASE) (HCC): ICD-10-CM

## 2021-01-22 DIAGNOSIS — R07.89 CHEST PRESSURE: ICD-10-CM

## 2021-01-22 LAB
ALBUMIN SERPL BCP-MCNC: 5.3 G/DL (ref 3.2–4.9)
ALBUMIN/GLOB SERPL: 1.4 G/DL
ALP SERPL-CCNC: 114 U/L (ref 30–99)
ALT SERPL-CCNC: 17 U/L (ref 2–50)
ANION GAP SERPL CALC-SCNC: 16 MMOL/L (ref 7–16)
AST SERPL-CCNC: 19 U/L (ref 12–45)
BASOPHILS # BLD AUTO: 1 % (ref 0–1.8)
BASOPHILS # BLD: 0.03 K/UL (ref 0–0.12)
BILIRUB SERPL-MCNC: 0.7 MG/DL (ref 0.1–1.5)
BUN SERPL-MCNC: 35 MG/DL (ref 8–22)
CALCIUM SERPL-MCNC: 9.8 MG/DL (ref 8.4–10.2)
CHLORIDE SERPL-SCNC: 89 MMOL/L (ref 96–112)
CHOLEST SERPL-MCNC: 186 MG/DL (ref 100–199)
CO2 SERPL-SCNC: 29 MMOL/L (ref 20–33)
CREAT SERPL-MCNC: 5.98 MG/DL (ref 0.5–1.4)
EKG IMPRESSION: NORMAL
EOSINOPHIL # BLD AUTO: 0.14 K/UL (ref 0–0.51)
EOSINOPHIL NFR BLD: 4.6 % (ref 0–6.9)
ERYTHROCYTE [DISTWIDTH] IN BLOOD BY AUTOMATED COUNT: 40 FL (ref 35.9–50)
FLUAV RNA SPEC QL NAA+PROBE: NEGATIVE
FLUBV RNA SPEC QL NAA+PROBE: NEGATIVE
GLOBULIN SER CALC-MCNC: 3.9 G/DL (ref 1.9–3.5)
GLUCOSE SERPL-MCNC: 71 MG/DL (ref 65–99)
HCT VFR BLD AUTO: 46.5 % (ref 37–47)
HDLC SERPL-MCNC: 74 MG/DL
HGB BLD-MCNC: 15.1 G/DL (ref 12–16)
IMM GRANULOCYTES # BLD AUTO: 0.01 K/UL (ref 0–0.11)
IMM GRANULOCYTES NFR BLD AUTO: 0.3 % (ref 0–0.9)
LDLC SERPL CALC-MCNC: 91 MG/DL
LYMPHOCYTES # BLD AUTO: 0.46 K/UL (ref 1–4.8)
LYMPHOCYTES NFR BLD: 15 % (ref 22–41)
MCH RBC QN AUTO: 28.5 PG (ref 27–33)
MCHC RBC AUTO-ENTMCNC: 32.5 G/DL (ref 33.6–35)
MCV RBC AUTO: 87.9 FL (ref 81.4–97.8)
MONOCYTES # BLD AUTO: 0.28 K/UL (ref 0–0.85)
MONOCYTES NFR BLD AUTO: 9.2 % (ref 0–13.4)
NEUTROPHILS # BLD AUTO: 2.14 K/UL (ref 2–7.15)
NEUTROPHILS NFR BLD: 69.9 % (ref 44–72)
NRBC # BLD AUTO: 0 K/UL
NRBC BLD-RTO: 0 /100 WBC
PLATELET # BLD AUTO: 185 K/UL (ref 164–446)
PMV BLD AUTO: 10.8 FL (ref 9–12.9)
POTASSIUM SERPL-SCNC: 4.5 MMOL/L (ref 3.6–5.5)
PROT SERPL-MCNC: 9.2 G/DL (ref 6–8.2)
RBC # BLD AUTO: 5.29 M/UL (ref 4.2–5.4)
RSV RNA SPEC QL NAA+PROBE: NEGATIVE
SARS-COV-2 RNA RESP QL NAA+PROBE: NOTDETECTED
SODIUM SERPL-SCNC: 134 MMOL/L (ref 135–145)
SPECIMEN SOURCE: NORMAL
TRIGL SERPL-MCNC: 105 MG/DL (ref 0–149)
TROPONIN T SERPL-MCNC: 34 NG/L (ref 6–19)
TROPONIN T SERPL-MCNC: 36 NG/L (ref 6–19)
TROPONIN T SERPL-MCNC: 37 NG/L (ref 6–19)
TSH SERPL DL<=0.005 MIU/L-ACNC: 0.87 UIU/ML (ref 0.38–5.33)
WBC # BLD AUTO: 3.1 K/UL (ref 4.8–10.8)

## 2021-01-22 PROCEDURE — 36415 COLL VENOUS BLD VENIPUNCTURE: CPT

## 2021-01-22 PROCEDURE — 700111 HCHG RX REV CODE 636 W/ 250 OVERRIDE (IP): Performed by: STUDENT IN AN ORGANIZED HEALTH CARE EDUCATION/TRAINING PROGRAM

## 2021-01-22 PROCEDURE — 0241U HCHG SARS-COV-2 COVID-19 NFCT DS RESP RNA 4 TRGT MIC: CPT

## 2021-01-22 PROCEDURE — G0378 HOSPITAL OBSERVATION PER HR: HCPCS

## 2021-01-22 PROCEDURE — 93005 ELECTROCARDIOGRAM TRACING: CPT | Performed by: EMERGENCY MEDICINE

## 2021-01-22 PROCEDURE — 80061 LIPID PANEL: CPT

## 2021-01-22 PROCEDURE — 99218 PR INITIAL OBSERVATION CARE,LEVL I: CPT | Performed by: STUDENT IN AN ORGANIZED HEALTH CARE EDUCATION/TRAINING PROGRAM

## 2021-01-22 PROCEDURE — 80053 COMPREHEN METABOLIC PANEL: CPT

## 2021-01-22 PROCEDURE — 71045 X-RAY EXAM CHEST 1 VIEW: CPT

## 2021-01-22 PROCEDURE — 84443 ASSAY THYROID STIM HORMONE: CPT

## 2021-01-22 PROCEDURE — 96372 THER/PROPH/DIAG INJ SC/IM: CPT

## 2021-01-22 PROCEDURE — 85025 COMPLETE CBC W/AUTO DIFF WBC: CPT

## 2021-01-22 PROCEDURE — 93005 ELECTROCARDIOGRAM TRACING: CPT

## 2021-01-22 PROCEDURE — 99285 EMERGENCY DEPT VISIT HI MDM: CPT

## 2021-01-22 PROCEDURE — 700102 HCHG RX REV CODE 250 W/ 637 OVERRIDE(OP): Performed by: STUDENT IN AN ORGANIZED HEALTH CARE EDUCATION/TRAINING PROGRAM

## 2021-01-22 PROCEDURE — 83036 HEMOGLOBIN GLYCOSYLATED A1C: CPT

## 2021-01-22 PROCEDURE — A9270 NON-COVERED ITEM OR SERVICE: HCPCS | Performed by: STUDENT IN AN ORGANIZED HEALTH CARE EDUCATION/TRAINING PROGRAM

## 2021-01-22 PROCEDURE — 84484 ASSAY OF TROPONIN QUANT: CPT | Mod: 91

## 2021-01-22 RX ORDER — METOPROLOL TARTRATE 50 MG/1
50 TABLET, FILM COATED ORAL TWICE DAILY
Status: DISCONTINUED | OUTPATIENT
Start: 2021-01-22 | End: 2021-01-25 | Stop reason: HOSPADM

## 2021-01-22 RX ORDER — ONDANSETRON 4 MG/1
4 TABLET, ORALLY DISINTEGRATING ORAL EVERY 4 HOURS PRN
Status: DISCONTINUED | OUTPATIENT
Start: 2021-01-22 | End: 2021-01-25 | Stop reason: HOSPADM

## 2021-01-22 RX ORDER — CALCIUM CARBONATE 500 MG/1
500 TABLET, CHEWABLE ORAL 3 TIMES DAILY
COMMUNITY
End: 2021-09-16

## 2021-01-22 RX ORDER — POLYETHYLENE GLYCOL 3350 17 G/17G
1 POWDER, FOR SOLUTION ORAL
Status: DISCONTINUED | OUTPATIENT
Start: 2021-01-22 | End: 2021-01-25 | Stop reason: HOSPADM

## 2021-01-22 RX ORDER — GUAIFENESIN/DEXTROMETHORPHAN 100-10MG/5
10 SYRUP ORAL EVERY 6 HOURS PRN
Status: DISCONTINUED | OUTPATIENT
Start: 2021-01-22 | End: 2021-01-25 | Stop reason: HOSPADM

## 2021-01-22 RX ORDER — BISACODYL 10 MG
10 SUPPOSITORY, RECTAL RECTAL
Status: DISCONTINUED | OUTPATIENT
Start: 2021-01-22 | End: 2021-01-25 | Stop reason: HOSPADM

## 2021-01-22 RX ORDER — CLONIDINE HYDROCHLORIDE 0.1 MG/1
0.1 TABLET ORAL EVERY 6 HOURS PRN
Status: DISCONTINUED | OUTPATIENT
Start: 2021-01-22 | End: 2021-01-25 | Stop reason: HOSPADM

## 2021-01-22 RX ORDER — CALCIUM CARBONATE 500 MG/1
500 TABLET, CHEWABLE ORAL 3 TIMES DAILY
Status: DISCONTINUED | OUTPATIENT
Start: 2021-01-22 | End: 2021-01-25 | Stop reason: HOSPADM

## 2021-01-22 RX ORDER — ACETAMINOPHEN 325 MG/1
650 TABLET ORAL EVERY 6 HOURS PRN
Status: DISCONTINUED | OUTPATIENT
Start: 2021-01-22 | End: 2021-01-25 | Stop reason: HOSPADM

## 2021-01-22 RX ORDER — HEPARIN SODIUM 5000 [USP'U]/ML
5000 INJECTION, SOLUTION INTRAVENOUS; SUBCUTANEOUS EVERY 8 HOURS
Status: DISCONTINUED | OUTPATIENT
Start: 2021-01-22 | End: 2021-01-25 | Stop reason: HOSPADM

## 2021-01-22 RX ORDER — AMOXICILLIN 250 MG
2 CAPSULE ORAL 2 TIMES DAILY
Status: DISCONTINUED | OUTPATIENT
Start: 2021-01-23 | End: 2021-01-25 | Stop reason: HOSPADM

## 2021-01-22 RX ORDER — ONDANSETRON 2 MG/ML
4 INJECTION INTRAMUSCULAR; INTRAVENOUS EVERY 4 HOURS PRN
Status: DISCONTINUED | OUTPATIENT
Start: 2021-01-22 | End: 2021-01-23

## 2021-01-22 RX ADMIN — HEPARIN SODIUM 5000 UNITS: 5000 INJECTION, SOLUTION INTRAVENOUS; SUBCUTANEOUS at 15:45

## 2021-01-22 RX ADMIN — METOPROLOL TARTRATE 50 MG: 50 TABLET, FILM COATED ORAL at 17:41

## 2021-01-22 RX ADMIN — HEPARIN SODIUM 5000 UNITS: 5000 INJECTION, SOLUTION INTRAVENOUS; SUBCUTANEOUS at 21:50

## 2021-01-22 RX ADMIN — CALCIUM CARBONATE (ANTACID) CHEW TAB 500 MG 500 MG: 500 CHEW TAB at 17:41

## 2021-01-22 ASSESSMENT — ENCOUNTER SYMPTOMS
SHORTNESS OF BREATH: 0
NAUSEA: 0
BRUISES/BLEEDS EASILY: 0
VOMITING: 0
DOUBLE VISION: 0
DIZZINESS: 1
NERVOUS/ANXIOUS: 0
BACK PAIN: 0
FLANK PAIN: 0
MEMORY LOSS: 0
BLOOD IN STOOL: 0
FEVER: 0
HEADACHES: 0
CHILLS: 0
DIAPHORESIS: 0
DIARRHEA: 0
SORE THROAT: 0
EYE DISCHARGE: 0
TINGLING: 0
SEIZURES: 0
WEAKNESS: 0
SPUTUM PRODUCTION: 0
WEIGHT LOSS: 0
SINUS PAIN: 0
EYE REDNESS: 0
NECK PAIN: 0
STRIDOR: 0
INSOMNIA: 0
MYALGIAS: 0
PALPITATIONS: 1
WHEEZING: 0
COUGH: 0
EYE PAIN: 0
POLYDIPSIA: 0

## 2021-01-22 ASSESSMENT — LIFESTYLE VARIABLES: DO YOU DRINK ALCOHOL: NO

## 2021-01-22 ASSESSMENT — FIBROSIS 4 INDEX
FIB4 SCORE: 0.95
FIB4 SCORE: 0.93

## 2021-01-22 NOTE — ASSESSMENT & PLAN NOTE
Patient with leukopenia in the setting of immunocompromise and ESRD.  No intervention indicated this time   Continue to monitor.

## 2021-01-22 NOTE — ED TRIAGE NOTES
"Presents after completing a dialysis session earlier this AM.  C/O central chest pressure with associated exertional dyspnea recurring for the past 3 days.   Chief Complaint   Patient presents with   • Chest Pressure   • Shortness of Breath     BP (!) 167/89   Pulse 65   Temp 36.6 °C (97.8 °F) (Temporal)   Resp 18   Ht 1.651 m (5' 5\")   Wt 59 kg (130 lb 1.1 oz)   LMP 01/18/2021 (Approximate)   SpO2 99%   BMI 21.64 kg/m²      "

## 2021-01-22 NOTE — ASSESSMENT & PLAN NOTE
Patient with a 3-day history of intermittent chest pressure/pain patient does have cardiac risk equivalent in the form of end-stage renal disease dialysis dependent.  Patient has never had apparently this pain in the past.    Tele monitoring  TSH, A1c, lipid profile- [facility]  Echocardiogram showed ejection fraction 65%, Hyperdynamic left ventricular systolic function.  Trace tricuspid regurgitation.Estimated right ventricular systolic pressure is 33 mmHg.  Pending Stress test

## 2021-01-22 NOTE — ED PROVIDER NOTES
ED Provider Note  CHIEF COMPLAINT  Chief Complaint   Patient presents with   • Chest Pressure   • Shortness of Breath       HPI  Mago Farfan is a 40 y.o. female who presents with chest pain and shortness of breath.  Patient's symptoms started 3 days ago.  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  She states she gets a tightness in her chest and she starts getting sweaty.  She states the pain will go away if she rests.  She also complains of a headache and feels diffusely weak when these episodes occur.  She denies any cough or fever.  Denies any exposure to Covid.  Denies any previous cardiac history.  Patient is currently on dialysis Monday Wednesday Friday.  She came in today because they had to cut dialysis short because she was feeling weak and dizzy.  Patient states she is also had a couple episodes of nausea and diarrhea.    CAD Risk factor review:  Denied history of CAD, hyperlipidemia, diabetes, no hypertension, no tobacco. Denies methamphetamine and cocaine. No obese.  Family history negative for early CAD.    Aortic Dissection risk factors review: No sudden severe tearing pain. No radiation to the back. No neuro symptoms, No known aortic valvular abnormality or aortic aneurysm. No known connective tissue disorder.     Pulmonary Embolism risk factor review:  No recent surgery, No unilateral lower extremity. No history of DVT or PE. Denies hemoptysis, denies current unilateral leg swelling. No history of malignancy.  No BCP or hormone therapy.  No tachycardia or hypoxia.    Other review: no fevers, IVDA, positional symptoms, no abdominal pain     History was obtained using  from radiology.    REVIEW OF SYSTEMS  As per HPI, otherwise a 10 point review of systems is negative    PAST MEDICAL HISTORY  Past Medical History:   Diagnosis Date   • Dialysis patient (HCC)    • EKG abnormality     borderline prolonged QTC.   • ESRD (end stage renal disease) on  "dialysis ([facility])    • Hypertension    • Lupus nephritis, ISN/RPS class IV ([facility])        SOCIAL HISTORY  Social History     Tobacco Use   • Smoking status: Never Smoker   • Smokeless tobacco: Never Used   Substance Use Topics   • Alcohol use: No   • Drug use: No       SURGICAL HISTORY  Past Surgical History:   Procedure Laterality Date   • OTHER CARDIAC SURGERY      \"liquid drained from heart\", per pt   • PRIMARY C SECTION       (of some type)       CURRENT MEDICATIONS  Home Medications     Reviewed by Dejuan Ozuna (Pharmacy Tech) on 21 at 1300  Med List Status: Complete   Medication Last Dose Status   calcium carbonate (TUMS) 500 MG Chew Tab 2021 Active   metoprolol (LOPRESSOR) 50 MG Tab 2021 Active   Multiple Vitamins-Minerals (ONE-A-DAY WOMENS PO) 2021 Active                ALLERGIES  Allergies   Allergen Reactions   • Labetalol Palpitations     Fast heart rate, rash, HTN   • Iodine Rash     Rash   • Morphine Itching     Pt states that she can tolerate small dose of morphine (can tolerate up to 2mg dosage).   • Pork Allergy Itching     Itchy skin       PHYSICAL EXAM  VITAL SIGNS: /88   Pulse (!) 58   Temp 36.6 °C (97.8 °F) (Temporal)   Resp 16   Ht 1.651 m (5' 5\")   Wt 59 kg (130 lb 1.1 oz)   LMP 2021 (Approximate)   SpO2 98%   BMI 21.64 kg/m²    Constitutional: Awake and alert  HENT:  Atraumatic, Normocephalic.Oropharynx dry mucus membranes, Nose normal inspection.   Eyes: Normal inspection  Neck: Supple  Cardiovascular: Normal heart rate, Normal rhythm.  Symmetric peripheral pulses.   Thorax & Lungs: No respiratory distress, No wheezing, No rales, No rhonchi, No chest tenderness.   Abdomen: Bowel sounds normal, soft, non-distended, nontender, no mass  Skin: Warm, Dry, No rash.   Back: No tenderness, No CVA tenderness.   Extremities: No clubbing, cyanosis, edema, no Homans or cords, palpable thrill in left arm  Neurologic: Grossly normal   Psychiatric: " Anxious appearing    RADIOLOGY/PROCEDURES  DX-CHEST-PORTABLE (1 VIEW)   Final Result      No radiographic evidence of acute cardiopulmonary process.      EC-ECHOCARDIOGRAM COMPLETE W/O CONT    (Results Pending)        Imaging is interpreted by radiologist    Labs:  Results for orders placed or performed during the hospital encounter of 01/22/21   CBC with Differential   Result Value Ref Range    WBC 3.1 (L) 4.8 - 10.8 K/uL    RBC 5.29 4.20 - 5.40 M/uL    Hemoglobin 15.1 12.0 - 16.0 g/dL    Hematocrit 46.5 37.0 - 47.0 %    MCV 87.9 81.4 - 97.8 fL    MCH 28.5 27.0 - 33.0 pg    MCHC 32.5 (L) 33.6 - 35.0 g/dL    RDW 40.0 35.9 - 50.0 fL    Platelet Count 185 164 - 446 K/uL    MPV 10.8 9.0 - 12.9 fL    Neutrophils-Polys 69.90 44.00 - 72.00 %    Lymphocytes 15.00 (L) 22.00 - 41.00 %    Monocytes 9.20 0.00 - 13.40 %    Eosinophils 4.60 0.00 - 6.90 %    Basophils 1.00 0.00 - 1.80 %    Immature Granulocytes 0.30 0.00 - 0.90 %    Nucleated RBC 0.00 /100 WBC    Neutrophils (Absolute) 2.14 2.00 - 7.15 K/uL    Lymphs (Absolute) 0.46 (L) 1.00 - 4.80 K/uL    Monos (Absolute) 0.28 0.00 - 0.85 K/uL    Eos (Absolute) 0.14 0.00 - 0.51 K/uL    Baso (Absolute) 0.03 0.00 - 0.12 K/uL    Immature Granulocytes (abs) 0.01 0.00 - 0.11 K/uL    NRBC (Absolute) 0.00 K/uL   Complete Metabolic Panel (CMP)   Result Value Ref Range    Sodium 134 (L) 135 - 145 mmol/L    Potassium 4.5 3.6 - 5.5 mmol/L    Chloride 89 (L) 96 - 112 mmol/L    Co2 29 20 - 33 mmol/L    Anion Gap 16.0 7.0 - 16.0    Glucose 71 65 - 99 mg/dL    Bun 35 (H) 8 - 22 mg/dL    Creatinine 5.98 (HH) 0.50 - 1.40 mg/dL    Calcium 9.8 8.4 - 10.2 mg/dL    AST(SGOT) 19 12 - 45 U/L    ALT(SGPT) 17 2 - 50 U/L    Alkaline Phosphatase 114 (H) 30 - 99 U/L    Total Bilirubin 0.7 0.1 - 1.5 mg/dL    Albumin 5.3 (H) 3.2 - 4.9 g/dL    Total Protein 9.2 (H) 6.0 - 8.2 g/dL    Globulin 3.9 (H) 1.9 - 3.5 g/dL    A-G Ratio 1.4 g/dL   Troponin   Result Value Ref Range    Troponin T 37 (H) 6 - 19 ng/L    COV-2, FLU A/B, AND RSV BY PCR (2-4 HOURS CEPHEID): Collect NP swab in VTM    Specimen: Respirate   Result Value Ref Range    Influenza virus A RNA Negative Negative    Influenza virus B, PCR Negative Negative    RSV, PCR Negative Negative    SARS-CoV-2 by PCR NotDetected     SARS-CoV-2 Source NP Swab    ESTIMATED GFR   Result Value Ref Range    GFR If  9 (A) >60 mL/min/1.73 m 2    GFR If Non  8 (A) >60 mL/min/1.73 m 2   Lipid Profile   Result Value Ref Range    Cholesterol,Tot 186 100 - 199 mg/dL    Triglycerides 105 0 - 149 mg/dL    HDL 74 >=40 mg/dL    LDL 91 <100 mg/dL   TSH   Result Value Ref Range    TSH 0.874 0.380 - 5.330 uIU/mL   EKG   Result Value Ref Range    Report       [facility] Emergency Dept.    Test Date:  2021  Pt Name:    MARGARITA DEWEY                 Department: [facility]  MRN:        [id number]                      Room:  Gender:     Female                       Technician: GT  :        1980                   Requested By:ER TRIAGE PROTOCOL  Order #:    [id number]                    Reading MD: Nena Yun    Measurements  Intervals                                Axis  Rate:       58                           P:          58  CT:         170                          QRS:        74  QRSD:       82                           T:          103  QT:         435  QTc:        428    Interpretive Statements  Sinus bradycardia  Abnrm T, consider ischemia, anterolateral lds  Compared to ECG 2020 05:47:54  Possible ischemia now present  Sinus rhythm no longer present  Electronically Signed On 2021 14:13:48 PST by Nena Yun         Medications - No data to display        COURSE & MEDICAL DECISION MAKING    Patient presents with chest pain dizziness and weakness.  It is exertionally related.  It has gradually gotten worse over the last 3 days.  Today she had an episode while receiving dialysis and therefore they to cut  dialysis short and she was sent here for evaluation.  Patient denies any fevers or cough therefore I doubt an infectious etiology.  She denies any previous cardiac problems.  Patient denies any history of DVT or blood clots.  Patient has a history significant for lupus and states she is on dialysis due to medication reaction. Heart score 8.    Patient's laboratory studies have returned.  She has a slightly bumped troponin.  This could be related to her chronic renal dysfunction but also could be related to a cardiac etiology.  EKG does not show evidence of acute MI but there are ST changes in the anterior leads with flipped T waves.  I think she would benefit from hospitalization and further cardiac evaluation.  Patient does have a white count of 3.1.  No history of exposure to Covid.  Covid test will be ordered.    FINAL IMPRESSION     1. Chest pressure     2. ESRD (end stage renal disease) (HCC)           This dictation was created using voice recognition software. The accuracy of the dictation is limited to the abilities of the software.  The nursing notes were reviewed and certain aspects of this information were incorporated into this note.      Electronically signed by: Nena Bautista M.D., 1/22/2021 1:28 PM

## 2021-01-22 NOTE — ASSESSMENT & PLAN NOTE
Patient claims her lupus nephritis has been in remission although this is very unclear  Patient does not appear to be an acute flare at this time  Needs outpatient follow up

## 2021-01-22 NOTE — ED NOTES
Pharmacy Medication Reconciliation      Medication reconciliation updated and complete per pt at bedside  Allergies have been verified and updated   No oral ABX within the last 14 days  Patient home pharmacy:[facility]    Pt reports she receives dialysis on Monday, Wednesday, & Friday

## 2021-01-22 NOTE — ASSESSMENT & PLAN NOTE
Patient had hemodialysis apparently today at this morning for which she had increased malaise and generalized weakness after dialysis which was different than she usually has.  HD on  Monday Wednesday Friday.     Pt's AM K is 6.1, she refused insulin & dextrose.   Extensive discussion with the pt regarding effects of HyperK & she still refused IV medications for that.   Nephrology was consulted & discussed with Dr Merritt- lexi for HD today.

## 2021-01-22 NOTE — ED NOTES
Seen by Dr Steele POC discussed with pt and pt for admission Currently asymptomatic Meal tray ordered

## 2021-01-22 NOTE — H&P
Hospital Medicine History & Physical Note    Date of Service  1/22/2021    Primary Care Physician  Quinton Christy M.D.    Consultants  None    Code Status  Full Code    Chief Complaint  Chief Complaint   Patient presents with   • Chest Pressure   • Shortness of Breath       History of Presenting Illness  40 y.o. female who presented 1/22/2021 with intermittent substernal chest pain/pressure 3 days    Patient is a very agreeable a 40-year-old  with a significant history of SLE nephritis resulting in renal failure and dialysis dependence.  With the past 3 days patient has been having intermittent substernal chest pressure/pain worsened with exertion sometimes alleviated by rest nonradiating.  Patient also describes this sensation after she gets dialysis as well she has never experienced this before in the past.  She cannot accurately recall if this is alleviated by rest although she believes it is.  Upon further interview patient also claims that her lupus nephritis is also in remission although she is still in dialysis.  She began feeling markedly weak today and also having substernal chest pressure which resulted in her coming to the emergency department for further evaluation.    Emergency department evaluation revealed a troponin that was mildly elevated at 37 although not markedly so EKG changes did not show any marked ST elevations.  Labs were reflective of ESRD.  Patient's potassium was within acceptable limits.  Patient will be admitted for telemetry for closer monitoring and cardiac work-up.  Patient denies recent sick contacts or diffuse rigors chills denies any nausea vomiting.        Review of Systems  Review of Systems   Constitutional: Positive for malaise/fatigue. Negative for chills, diaphoresis, fever and weight loss.   HENT: Negative for congestion, sinus pain and sore throat.    Eyes: Negative for double vision, pain, discharge and redness.   Respiratory: Negative for cough, sputum  production, shortness of breath, wheezing and stridor.    Cardiovascular: Positive for chest pain and palpitations. Negative for leg swelling.   Gastrointestinal: Negative for blood in stool, diarrhea, nausea and vomiting.   Genitourinary: Negative for flank pain, frequency and urgency.   Musculoskeletal: Negative for back pain, joint pain, myalgias and neck pain.   Skin: Negative for itching and rash.   Neurological: Positive for dizziness. Negative for tingling, seizures, weakness and headaches.   Endo/Heme/Allergies: Negative for polydipsia. Does not bruise/bleed easily.   Psychiatric/Behavioral: Negative for memory loss. The patient is not nervous/anxious and does not have insomnia.    All other systems reviewed and are negative.      Past Medical History   has a past medical history of Dialysis patient ([facility]), EKG abnormality, ESRD (end stage renal disease) on dialysis ([facility]) (2014), Hypertension, and Lupus nephritis, ISN/RPS class IV ([facility]) (2006).    Surgical History   has a past surgical history that includes other cardiac surgery and primary c section.     Family History  family history is not on file.     Social History   reports that she has never smoked. She has never used smokeless tobacco. She reports that she does not drink alcohol or use drugs.    Allergies  Allergies   Allergen Reactions   • Labetalol Palpitations     Fast heart rate, rash, HTN   • Iodine Rash     Rash   • Morphine Itching     Pt states that she can tolerate small dose of morphine (can tolerate up to 2mg dosage).   • Pork Allergy Itching     Itchy skin       Medications  Prior to Admission Medications   Prescriptions Last Dose Informant Patient Reported? Taking?   Multiple Vitamins-Minerals (ONE-A-DAY WOMENS PO) 1/20/2021 at K Patient Yes Yes   Sig: Take 1 Tab by mouth 3 times a week. Monday, Wednesday, Friday   calcium carbonate (TUMS) 500 MG Chew Tab 1/21/2021 at PM Patient Yes Yes   Sig: Chew 500 mg 3 times a day.   metoprolol  (LOPRESSOR) 50 MG Tab 1/22/2021 at 0700 Patient No No   Sig: TAKE 1 TABLET BY MOUTH TWICE A DAY      Facility-Administered Medications: None       Physical Exam  Temp:  [36.6 °C (97.8 °F)] 36.6 °C (97.8 °F)  Pulse:  [58-70] 66  Resp:  [13-18] 17  BP: (137-167)/(88-92) 137/92  SpO2:  [96 %-100 %] 98 %    Physical Exam  Vitals signs reviewed.   Constitutional:       Appearance: Normal appearance. She is normal weight.   HENT:      Head: Normocephalic and atraumatic.      Right Ear: External ear normal.      Left Ear: External ear normal.      Nose: No congestion or rhinorrhea.      Mouth/Throat:      Pharynx: No oropharyngeal exudate or posterior oropharyngeal erythema.   Eyes:      General: No scleral icterus.        Right eye: No discharge.         Left eye: No discharge.      Extraocular Movements: Extraocular movements intact.      Conjunctiva/sclera: Conjunctivae normal.      Pupils: Pupils are equal, round, and reactive to light.   Neck:      Musculoskeletal: Normal range of motion and neck supple. No neck rigidity or muscular tenderness.   Cardiovascular:      Rate and Rhythm: Normal rate and regular rhythm.      Pulses: Normal pulses.      Heart sounds: Normal heart sounds. No murmur. No friction rub.      Comments: Left forearm AV fistula audible bruit palpable thrill  Pulmonary:      Effort: Pulmonary effort is normal.      Breath sounds: Normal breath sounds. No stridor. No wheezing, rhonchi or rales.   Abdominal:      General: Bowel sounds are normal. There is no distension.      Palpations: Abdomen is soft.      Tenderness: There is no abdominal tenderness. There is no guarding or rebound.   Musculoskeletal: Normal range of motion.         General: No swelling, tenderness, deformity or signs of injury.   Skin:     General: Skin is warm and dry.      Coloration: Skin is not jaundiced or pale.      Findings: No bruising or erythema.   Neurological:      General: No focal deficit present.      Mental Status:  She is alert and oriented to person, place, and time.      Cranial Nerves: No cranial nerve deficit.      Sensory: No sensory deficit.      Motor: No weakness.      Coordination: Coordination normal.   Psychiatric:         Mood and Affect: Mood normal.         Behavior: Behavior normal.         Thought Content: Thought content normal.         Laboratory:  Recent Labs     01/22/21  1254   WBC 3.1*   RBC 5.29   HEMOGLOBIN 15.1   HEMATOCRIT 46.5   MCV 87.9   MCH 28.5   MCHC 32.5*   RDW 40.0   PLATELETCT 185   MPV 10.8     Recent Labs     01/22/21  1254   SODIUM 134*   POTASSIUM 4.5   CHLORIDE 89*   CO2 29   GLUCOSE 71   BUN 35*   CREATININE 5.98*   CALCIUM 9.8     Recent Labs     01/22/21  1254   ALTSGPT 17   ASTSGOT 19   ALKPHOSPHAT 114*   TBILIRUBIN 0.7   GLUCOSE 71         No results for input(s): NTPROBNP in the last 72 hours.      Recent Labs     01/22/21  1254   TROPONINT 37*       Imaging:  DX-CHEST-PORTABLE (1 VIEW)   Final Result      No radiographic evidence of acute cardiopulmonary process.            Assessment/Plan:  I anticipate this patient is appropriate for observation status at this time.    Pain in the chest- (present on admission)  Assessment & Plan  Patient with a 3-day history of intermittent chest pressure/pain patient does have cardiac risk equivalent in the form of end-stage renal disease dialysis dependent.  Patient has never had apparently this pain in the past.    Plan:  1.  Troponins every 6 hours x2  2.  TSH, A1c, lipid profile  3.  Admission to telemetry  4.  Depending on outcomes of further laboratory evaluation patient may need cardiac stress testing either inpatient or as an outpatient.    It should be noted patient has primary care provider which she has consistent follow-up with.    ESRD (end stage renal disease) on dialysis (HCC)- (present on admission)  Assessment & Plan  Patient had hemodialysis apparently today at this morning for which she had increased malaise and generalized  weakness after dialysis which was different than she usually has.  Nonetheless patient's normal dialysis schedule is Monday Wednesday Friday.  Patient is euvolemic at this time no indication of dialysis patient electrolytes are within acceptable limits.    Leukopenia- (present on admission)  Assessment & Plan  Patient with leukopenia in the setting of immunocompromise and ESRD.  No intervention indicated this time continue to monitor.    History of lupus nephritis- (present on admission)  Assessment & Plan  Patient claims her lupus nephritis has been in remission although this is very unclear at best.  Patient also claims that CellCept was what caused her kidneys to fail this is also unclear.  Patient does not appear to be an acute flare at this time.  Can be further managed on an outpatient basis.  Anticoagulation: Sequential compression devices Heparin 5000 every 8 hours all medications renally dosed per pharmacy

## 2021-01-23 ENCOUNTER — APPOINTMENT (OUTPATIENT)
Dept: RADIOLOGY | Facility: MEDICAL CENTER | Age: 41
End: 2021-01-23
Attending: STUDENT IN AN ORGANIZED HEALTH CARE EDUCATION/TRAINING PROGRAM
Payer: MEDICAID

## 2021-01-23 ENCOUNTER — APPOINTMENT (OUTPATIENT)
Dept: CARDIOLOGY | Facility: MEDICAL CENTER | Age: 41
End: 2021-01-23
Attending: STUDENT IN AN ORGANIZED HEALTH CARE EDUCATION/TRAINING PROGRAM
Payer: MEDICAID

## 2021-01-23 LAB
ANION GAP SERPL CALC-SCNC: 16 MMOL/L (ref 7–16)
BASOPHILS # BLD AUTO: 0.9 % (ref 0–1.8)
BASOPHILS # BLD: 0.03 K/UL (ref 0–0.12)
BUN SERPL-MCNC: 54 MG/DL (ref 8–22)
CALCIUM SERPL-MCNC: 8.7 MG/DL (ref 8.4–10.2)
CHLORIDE SERPL-SCNC: 93 MMOL/L (ref 96–112)
CO2 SERPL-SCNC: 25 MMOL/L (ref 20–33)
CREAT SERPL-MCNC: 8.44 MG/DL (ref 0.5–1.4)
EOSINOPHIL # BLD AUTO: 0.17 K/UL (ref 0–0.51)
EOSINOPHIL NFR BLD: 4.9 % (ref 0–6.9)
ERYTHROCYTE [DISTWIDTH] IN BLOOD BY AUTOMATED COUNT: 40.9 FL (ref 35.9–50)
EST. AVERAGE GLUCOSE BLD GHB EST-MCNC: 97 MG/DL
GLUCOSE SERPL-MCNC: 76 MG/DL (ref 65–99)
HBA1C MFR BLD: 5 % (ref 0–5.6)
HCT VFR BLD AUTO: 41.1 % (ref 37–47)
HGB BLD-MCNC: 13.2 G/DL (ref 12–16)
IMM GRANULOCYTES # BLD AUTO: 0.01 K/UL (ref 0–0.11)
IMM GRANULOCYTES NFR BLD AUTO: 0.3 % (ref 0–0.9)
LV EJECT FRACT  99904: 65
LV EJECT FRACT MOD 2C 99903: 81.68
LV EJECT FRACT MOD 4C 99902: 81.26
LV EJECT FRACT MOD BP 99901: 81.86
LYMPHOCYTES # BLD AUTO: 0.79 K/UL (ref 1–4.8)
LYMPHOCYTES NFR BLD: 22.7 % (ref 22–41)
MAGNESIUM SERPL-MCNC: 2.6 MG/DL (ref 1.5–2.5)
MCH RBC QN AUTO: 28.4 PG (ref 27–33)
MCHC RBC AUTO-ENTMCNC: 32.1 G/DL (ref 33.6–35)
MCV RBC AUTO: 88.6 FL (ref 81.4–97.8)
MONOCYTES # BLD AUTO: 0.41 K/UL (ref 0–0.85)
MONOCYTES NFR BLD AUTO: 11.8 % (ref 0–13.4)
NEUTROPHILS # BLD AUTO: 2.07 K/UL (ref 2–7.15)
NEUTROPHILS NFR BLD: 59.4 % (ref 44–72)
NRBC # BLD AUTO: 0 K/UL
NRBC BLD-RTO: 0 /100 WBC
PLATELET # BLD AUTO: 159 K/UL (ref 164–446)
PMV BLD AUTO: 10.8 FL (ref 9–12.9)
POTASSIUM SERPL-SCNC: 5.3 MMOL/L (ref 3.6–5.5)
RBC # BLD AUTO: 4.64 M/UL (ref 4.2–5.4)
SODIUM SERPL-SCNC: 134 MMOL/L (ref 135–145)
WBC # BLD AUTO: 3.5 K/UL (ref 4.8–10.8)

## 2021-01-23 PROCEDURE — 700111 HCHG RX REV CODE 636 W/ 250 OVERRIDE (IP): Performed by: STUDENT IN AN ORGANIZED HEALTH CARE EDUCATION/TRAINING PROGRAM

## 2021-01-23 PROCEDURE — 70450 CT HEAD/BRAIN W/O DYE: CPT

## 2021-01-23 PROCEDURE — 85025 COMPLETE CBC W/AUTO DIFF WBC: CPT

## 2021-01-23 PROCEDURE — 96372 THER/PROPH/DIAG INJ SC/IM: CPT

## 2021-01-23 PROCEDURE — 83735 ASSAY OF MAGNESIUM: CPT

## 2021-01-23 PROCEDURE — 80048 BASIC METABOLIC PNL TOTAL CA: CPT

## 2021-01-23 PROCEDURE — A9270 NON-COVERED ITEM OR SERVICE: HCPCS | Performed by: STUDENT IN AN ORGANIZED HEALTH CARE EDUCATION/TRAINING PROGRAM

## 2021-01-23 PROCEDURE — 93306 TTE W/DOPPLER COMPLETE: CPT | Mod: 26 | Performed by: INTERNAL MEDICINE

## 2021-01-23 PROCEDURE — 93306 TTE W/DOPPLER COMPLETE: CPT

## 2021-01-23 PROCEDURE — 700102 HCHG RX REV CODE 250 W/ 637 OVERRIDE(OP): Performed by: STUDENT IN AN ORGANIZED HEALTH CARE EDUCATION/TRAINING PROGRAM

## 2021-01-23 PROCEDURE — 99225 PR SUBSEQUENT OBSERVATION CARE,LEVEL II: CPT | Performed by: STUDENT IN AN ORGANIZED HEALTH CARE EDUCATION/TRAINING PROGRAM

## 2021-01-23 PROCEDURE — G0378 HOSPITAL OBSERVATION PER HR: HCPCS

## 2021-01-23 RX ORDER — HYDROXYZINE HYDROCHLORIDE 10 MG/1
10 TABLET, FILM COATED ORAL 3 TIMES DAILY PRN
Status: DISCONTINUED | OUTPATIENT
Start: 2021-01-23 | End: 2021-01-25 | Stop reason: HOSPADM

## 2021-01-23 RX ADMIN — CALCIUM CARBONATE (ANTACID) CHEW TAB 500 MG 500 MG: 500 CHEW TAB at 17:46

## 2021-01-23 RX ADMIN — HEPARIN SODIUM 5000 UNITS: 5000 INJECTION, SOLUTION INTRAVENOUS; SUBCUTANEOUS at 05:05

## 2021-01-23 RX ADMIN — HEPARIN SODIUM 5000 UNITS: 5000 INJECTION, SOLUTION INTRAVENOUS; SUBCUTANEOUS at 21:03

## 2021-01-23 RX ADMIN — METOPROLOL TARTRATE 50 MG: 50 TABLET, FILM COATED ORAL at 17:46

## 2021-01-23 RX ADMIN — HEPARIN SODIUM 5000 UNITS: 5000 INJECTION, SOLUTION INTRAVENOUS; SUBCUTANEOUS at 12:56

## 2021-01-23 RX ADMIN — CALCIUM CARBONATE (ANTACID) CHEW TAB 500 MG 500 MG: 500 CHEW TAB at 12:56

## 2021-01-23 RX ADMIN — CALCIUM CARBONATE (ANTACID) CHEW TAB 500 MG 500 MG: 500 CHEW TAB at 05:05

## 2021-01-23 ASSESSMENT — ENCOUNTER SYMPTOMS
DIZZINESS: 1
HEADACHES: 1
ABDOMINAL PAIN: 0
SHORTNESS OF BREATH: 0
DOUBLE VISION: 0
NECK PAIN: 1
CHILLS: 0
DIARRHEA: 0
WHEEZING: 0
FEVER: 0
VOMITING: 0
NERVOUS/ANXIOUS: 0
NAUSEA: 0
SORE THROAT: 0
FOCAL WEAKNESS: 0
WEAKNESS: 1
SINUS PAIN: 0
CONSTIPATION: 0
SPUTUM PRODUCTION: 0
BLURRED VISION: 0
SENSORY CHANGE: 1
PALPITATIONS: 1
COUGH: 0
MYALGIAS: 0

## 2021-01-23 ASSESSMENT — PAIN DESCRIPTION - PAIN TYPE: TYPE: ACUTE PAIN

## 2021-01-23 NOTE — PROGRESS NOTES
Assumed care for pt 0720.  Pt in room eating breakfast.  No c/o pain or chest discomfort.  Safety measures in place.

## 2021-01-23 NOTE — PROGRESS NOTES
Received report. Assumed pt care. Pt resting in bed, supine, respirations even and unlabored. Pt's son is at bedside. Pt a/o x4, no complaints of chest pain or shortness of breath. Pt needs met at this time. POC discussed and pt verbalizes understanding. All safety precautions in place.

## 2021-01-23 NOTE — PROGRESS NOTES
Hospital Medicine Daily Progress Note    Date of Service  1/23/2021    Chief Complaint  40 y.o. female admitted 1/22/2021 with chest pain     Hospital Course  40 y.o. 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  With the past 3 days patient has been having intermittent substernal chest pressure/pain worsened with exertion sometimes alleviated by rest nonradiating.    Emergency department evaluation revealed a troponin that was mildly elevated at 37 . EKG did not show any marked ST elevations.  Labs were reflective of ESRD.  Ordered ECHO & Stress test.       Interval Problem Update  Patient was seen and examined at bedside   services were used in the patient's primary language of Persian.    Mode of interpretation: iPad    Content of Interpretation:  Explained the patient that we are waiting for echocardiogram and stress test to rule out cardiac ischemia.  Patient stated that she also have headache, numbness on the left side of her face, neck pain. CT head ordered.   CXR Nil acute  Echocardiogram showed ejection fraction 65%, Hyperdynamic left ventricular systolic function.  Trace tricuspid regurgitation.Estimated right ventricular systolic pressure is 33 mmHg.  Pending Stress test & CT head.       Consultants/Specialty  None    Code Status  Full Code    Disposition  TBD    Review of Systems  Review of Systems   Constitutional: Positive for malaise/fatigue. Negative for chills and fever.   HENT: Negative for congestion, ear discharge, ear pain, sinus pain and sore throat.    Eyes: Negative for blurred vision and double vision.   Respiratory: Negative for cough, sputum production, shortness of breath and wheezing.    Cardiovascular: Positive for chest pain and palpitations. Negative for leg swelling.   Gastrointestinal: Negative for abdominal pain, constipation,  diarrhea, nausea and vomiting.   Genitourinary: Negative for dysuria, frequency and urgency.   Musculoskeletal: Positive for neck pain. Negative for myalgias.   Neurological: Positive for dizziness, sensory change, weakness and headaches. Negative for focal weakness.   Psychiatric/Behavioral: The patient is not nervous/anxious.         Physical Exam  Temp:  [36.4 °C (97.5 °F)-36.7 °C (98 °F)] 36.4 °C (97.5 °F)  Pulse:  [58-70] 63  Resp:  [13-18] 16  BP: (108-138)/(67-92) 123/91  SpO2:  [96 %-100 %] 97 %    Physical Exam  Constitutional:       General: She is not in acute distress.  HENT:      Head: Normocephalic and atraumatic.      Nose: Nose normal.      Mouth/Throat:      Mouth: Mucous membranes are moist.      Pharynx: No posterior oropharyngeal erythema.   Eyes:      General: No scleral icterus.     Extraocular Movements: Extraocular movements intact.      Conjunctiva/sclera: Conjunctivae normal.      Pupils: Pupils are equal, round, and reactive to light.   Neck:      Musculoskeletal: Normal range of motion and neck supple. No neck rigidity.   Cardiovascular:      Rate and Rhythm: Normal rate and regular rhythm.      Pulses: Normal pulses.      Heart sounds: Normal heart sounds. No murmur. No gallop.    Pulmonary:      Effort: Pulmonary effort is normal.      Breath sounds: Normal breath sounds. No stridor. No wheezing, rhonchi or rales.   Abdominal:      General: Bowel sounds are normal.      Palpations: Abdomen is soft.   Musculoskeletal:         General: No swelling or tenderness.      Comments: BLAINE AVF   Skin:     General: Skin is warm.   Neurological:      General: No focal deficit present.      Mental Status: She is alert and oriented to person, place, and time.   Psychiatric:         Mood and Affect: Mood normal.         Behavior: Behavior normal.         Fluids    Intake/Output Summary (Last 24 hours) at 1/23/2021 1241  Last data filed at 1/23/2021 1200  Gross per 24 hour   Intake 500 ml   Output --    Net 500 ml       Laboratory  Recent Labs     01/22/21  1254 01/23/21  0255   WBC 3.1* 3.5*   RBC 5.29 4.64   HEMOGLOBIN 15.1 13.2   HEMATOCRIT 46.5 41.1   MCV 87.9 88.6   MCH 28.5 28.4   MCHC 32.5* 32.1*   RDW 40.0 40.9   PLATELETCT 185 159*   MPV 10.8 10.8     Recent Labs     01/22/21  1254 01/23/21  0255   SODIUM 134* 134*   POTASSIUM 4.5 5.3   CHLORIDE 89* 93*   CO2 29 25   GLUCOSE 71 76   BUN 35* 54*   CREATININE 5.98* 8.44*   CALCIUM 9.8 8.7             Recent Labs     01/22/21  1254   TRIGLYCERIDE 105   HDL 74   LDL 91       Imaging  CT-HEAD W/O   Final Result      No evidence of acute intracranial process.      EC-ECHOCARDIOGRAM COMPLETE W/O CONT   Final Result      DX-CHEST-PORTABLE (1 VIEW)   Final Result      No radiographic evidence of acute cardiopulmonary process.      NM-CARDIAC STRESS TEST    (Results Pending)        Assessment/Plan  Pain in the chest- (present on admission)  Assessment & Plan  Patient with a 3-day history of intermittent chest pressure/pain patient does have cardiac risk equivalent in the form of end-stage renal disease dialysis dependent.  Patient has never had apparently this pain in the past.    Tele monitoring  TSH, A1c, lipid profile- [facility]  Echocardiogram showed ejection fraction 65%, Hyperdynamic left ventricular systolic function.  Trace tricuspid regurgitation.Estimated right ventricular systolic pressure is 33 mmHg.  Pending Stress test      ESRD (end stage renal disease) on dialysis (HCC)- (present on admission)  Assessment & Plan  Patient had hemodialysis apparently today at this morning for which she had increased malaise and generalized weakness after dialysis which was different than she usually has.  HD on  Monday Wednesday Friday.   Patient is euvolemic at this time no indication of dialysis  Electrolytes are within acceptable limits.    Leukopenia- (present on admission)  Assessment & Plan  Patient with leukopenia in the setting of immunocompromise and ESRD.  No  intervention indicated this time   Continue to monitor.    History of lupus nephritis- (present on admission)  Assessment & Plan  Patient claims her lupus nephritis has been in remission although this is very unclear  Patient does not appear to be an acute flare at this time  Needs outpatient follow up       VTE prophylaxis: Heparin

## 2021-01-23 NOTE — PROGRESS NOTES
Received call from [employer] leaves at noon and since pt ate this morning before order for stress test put in, pt unable to preform stress test.  Options given for completing the test tomorrow am or to have pt be NPO tonight and do test in the am.  MD marr.  Came and spoke to patient and opted for am stress test.

## 2021-01-24 ENCOUNTER — APPOINTMENT (OUTPATIENT)
Dept: RADIOLOGY | Facility: MEDICAL CENTER | Age: 41
End: 2021-01-24
Attending: STUDENT IN AN ORGANIZED HEALTH CARE EDUCATION/TRAINING PROGRAM
Payer: MEDICAID

## 2021-01-24 LAB
ALBUMIN SERPL BCP-MCNC: 4.1 G/DL (ref 3.2–4.9)
ALBUMIN/GLOB SERPL: 1.5 G/DL
ALP SERPL-CCNC: 82 U/L (ref 30–99)
ALT SERPL-CCNC: 11 U/L (ref 2–50)
ANION GAP SERPL CALC-SCNC: 19 MMOL/L (ref 7–16)
AST SERPL-CCNC: 12 U/L (ref 12–45)
BASOPHILS # BLD AUTO: 1.1 % (ref 0–1.8)
BASOPHILS # BLD: 0.04 K/UL (ref 0–0.12)
BILIRUB SERPL-MCNC: 0.3 MG/DL (ref 0.1–1.5)
BUN SERPL-MCNC: 76 MG/DL (ref 8–22)
CALCIUM SERPL-MCNC: 8.9 MG/DL (ref 8.4–10.2)
CHLORIDE SERPL-SCNC: 91 MMOL/L (ref 96–112)
CO2 SERPL-SCNC: 23 MMOL/L (ref 20–33)
CREAT SERPL-MCNC: 11.02 MG/DL (ref 0.5–1.4)
EOSINOPHIL # BLD AUTO: 0.19 K/UL (ref 0–0.51)
EOSINOPHIL NFR BLD: 5.1 % (ref 0–6.9)
ERYTHROCYTE [DISTWIDTH] IN BLOOD BY AUTOMATED COUNT: 40.2 FL (ref 35.9–50)
GLOBULIN SER CALC-MCNC: 2.7 G/DL (ref 1.9–3.5)
GLUCOSE SERPL-MCNC: 76 MG/DL (ref 65–99)
HCT VFR BLD AUTO: 38.5 % (ref 37–47)
HGB BLD-MCNC: 12.6 G/DL (ref 12–16)
IMM GRANULOCYTES # BLD AUTO: 0.01 K/UL (ref 0–0.11)
IMM GRANULOCYTES NFR BLD AUTO: 0.3 % (ref 0–0.9)
LYMPHOCYTES # BLD AUTO: 0.93 K/UL (ref 1–4.8)
LYMPHOCYTES NFR BLD: 24.9 % (ref 22–41)
MCH RBC QN AUTO: 28.8 PG (ref 27–33)
MCHC RBC AUTO-ENTMCNC: 32.7 G/DL (ref 33.6–35)
MCV RBC AUTO: 88.1 FL (ref 81.4–97.8)
MONOCYTES # BLD AUTO: 0.41 K/UL (ref 0–0.85)
MONOCYTES NFR BLD AUTO: 11 % (ref 0–13.4)
NEUTROPHILS # BLD AUTO: 2.16 K/UL (ref 2–7.15)
NEUTROPHILS NFR BLD: 57.6 % (ref 44–72)
NRBC # BLD AUTO: 0 K/UL
NRBC BLD-RTO: 0 /100 WBC
PLATELET # BLD AUTO: 170 K/UL (ref 164–446)
PMV BLD AUTO: 10.9 FL (ref 9–12.9)
POTASSIUM SERPL-SCNC: 5.6 MMOL/L (ref 3.6–5.5)
POTASSIUM SERPL-SCNC: 6.1 MMOL/L (ref 3.6–5.5)
PROT SERPL-MCNC: 6.8 G/DL (ref 6–8.2)
RBC # BLD AUTO: 4.37 M/UL (ref 4.2–5.4)
SODIUM SERPL-SCNC: 133 MMOL/L (ref 135–145)
WBC # BLD AUTO: 3.7 K/UL (ref 4.8–10.8)

## 2021-01-24 PROCEDURE — 80053 COMPREHEN METABOLIC PANEL: CPT

## 2021-01-24 PROCEDURE — 84132 ASSAY OF SERUM POTASSIUM: CPT | Mod: XU

## 2021-01-24 PROCEDURE — 700101 HCHG RX REV CODE 250: Performed by: INTERNAL MEDICINE

## 2021-01-24 PROCEDURE — 700105 HCHG RX REV CODE 258: Performed by: INTERNAL MEDICINE

## 2021-01-24 PROCEDURE — 99225 PR SUBSEQUENT OBSERVATION CARE,LEVEL II: CPT | Performed by: STUDENT IN AN ORGANIZED HEALTH CARE EDUCATION/TRAINING PROGRAM

## 2021-01-24 PROCEDURE — A9270 NON-COVERED ITEM OR SERVICE: HCPCS | Performed by: STUDENT IN AN ORGANIZED HEALTH CARE EDUCATION/TRAINING PROGRAM

## 2021-01-24 PROCEDURE — 700111 HCHG RX REV CODE 636 W/ 250 OVERRIDE (IP): Performed by: INTERNAL MEDICINE

## 2021-01-24 PROCEDURE — 700102 HCHG RX REV CODE 250 W/ 637 OVERRIDE(OP): Performed by: STUDENT IN AN ORGANIZED HEALTH CARE EDUCATION/TRAINING PROGRAM

## 2021-01-24 PROCEDURE — 90935 HEMODIALYSIS ONE EVALUATION: CPT

## 2021-01-24 PROCEDURE — 700111 HCHG RX REV CODE 636 W/ 250 OVERRIDE (IP): Performed by: STUDENT IN AN ORGANIZED HEALTH CARE EDUCATION/TRAINING PROGRAM

## 2021-01-24 PROCEDURE — G0378 HOSPITAL OBSERVATION PER HR: HCPCS

## 2021-01-24 PROCEDURE — 700102 HCHG RX REV CODE 250 W/ 637 OVERRIDE(OP): Performed by: INTERNAL MEDICINE

## 2021-01-24 PROCEDURE — 85025 COMPLETE CBC W/AUTO DIFF WBC: CPT

## 2021-01-24 PROCEDURE — 96372 THER/PROPH/DIAG INJ SC/IM: CPT

## 2021-01-24 RX ORDER — DEXTROSE MONOHYDRATE 25 G/50ML
50 INJECTION, SOLUTION INTRAVENOUS ONCE
Status: DISCONTINUED | OUTPATIENT
Start: 2021-01-24 | End: 2021-01-24

## 2021-01-24 RX ADMIN — CALCIUM CARBONATE (ANTACID) CHEW TAB 500 MG 500 MG: 500 CHEW TAB at 17:55

## 2021-01-24 RX ADMIN — HEPARIN SODIUM 5000 UNITS: 5000 INJECTION, SOLUTION INTRAVENOUS; SUBCUTANEOUS at 13:05

## 2021-01-24 RX ADMIN — CALCIUM CARBONATE (ANTACID) CHEW TAB 500 MG 500 MG: 500 CHEW TAB at 13:05

## 2021-01-24 RX ADMIN — HEPARIN SODIUM 5000 UNITS: 5000 INJECTION, SOLUTION INTRAVENOUS; SUBCUTANEOUS at 21:04

## 2021-01-24 RX ADMIN — HEPARIN SODIUM 5000 UNITS: 5000 INJECTION, SOLUTION INTRAVENOUS; SUBCUTANEOUS at 05:54

## 2021-01-24 RX ADMIN — SENNOSIDES AND DOCUSATE SODIUM 2 TABLET: 8.6; 5 TABLET ORAL at 05:54

## 2021-01-24 RX ADMIN — METOPROLOL TARTRATE 50 MG: 50 TABLET, FILM COATED ORAL at 17:55

## 2021-01-24 RX ADMIN — CALCIUM CARBONATE (ANTACID) CHEW TAB 500 MG 500 MG: 500 CHEW TAB at 05:54

## 2021-01-24 ASSESSMENT — ENCOUNTER SYMPTOMS
SINUS PAIN: 0
HEADACHES: 1
VOMITING: 0
BLURRED VISION: 0
WEAKNESS: 1
SPUTUM PRODUCTION: 0
SHORTNESS OF BREATH: 0
CONSTIPATION: 0
FEVER: 0
DIZZINESS: 1
PALPITATIONS: 1
SORE THROAT: 0
ABDOMINAL PAIN: 0
CHILLS: 0
DIARRHEA: 0
SENSORY CHANGE: 1
DOUBLE VISION: 0
COUGH: 0
FOCAL WEAKNESS: 0
WHEEZING: 0
NECK PAIN: 1
NAUSEA: 0
MYALGIAS: 0
NERVOUS/ANXIOUS: 0

## 2021-01-24 NOTE — PROGRESS NOTES
Patient refused insulin and d50 bolus for K of 6.1, and Ca gluconate. states she will wait for dialysis tomorrow. Patient educated on the importance of balanced electrolytes. Verbalized understanding. MD notified.

## 2021-01-24 NOTE — PROGRESS NOTES
Hospital Medicine Daily Progress Note    Date of Service  1/24/2021    Chief Complaint  40 y.o. female admitted 1/22/2021 with chest pain     Hospital Course  40 y.o. 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  With the past 3 days patient has been having intermittent substernal chest pressure/pain worsened with exertion sometimes alleviated by rest nonradiating.    Emergency department evaluation revealed a troponin that was mildly elevated at 37 . EKG did not show any marked ST elevations.  Labs were reflective of ESRD.  Ordered ECHO & Stress test.       Interval Problem Update   services were used in the patient's primary language of [place].      Name or Number: [id number]  Mode of interpretation: iPad     Content of Interpretation:  Patient was seen and examined at bedside.  No chest pain this morning.   Pt's AM K is 6.1, she refused insulin & dextrose.   Extensive discussion with the pt regarding effects of HyperK & she still refused IV medications for that.   Nephrology was consulted & discussed with Dr Merritt- plan for HD today.   Also explained the patient about CT head result which showed no acute, echocardiogram showed ejection fraction 65%.  Also told her that lipid panel, TSH, A1c within normal limit.    No way stress test can be done with the potassium of 6.1.  Stress test pending .Plan for stress test after dialysis later today or tomorrow.    Consultants/Specialty  Nephrology    Code Status  Full Code    Disposition  TBD    Review of Systems  Review of Systems   Constitutional: Positive for malaise/fatigue. Negative for chills and fever.   HENT: Negative for congestion, ear discharge, ear pain, sinus pain and sore throat.    Eyes: Negative for blurred vision and double vision.   Respiratory: Negative for cough, sputum production, shortness  of breath and wheezing.    Cardiovascular: Positive for chest pain and palpitations. Negative for leg swelling.   Gastrointestinal: Negative for abdominal pain, constipation, diarrhea, nausea and vomiting.   Genitourinary: Negative for dysuria, frequency and urgency.   Musculoskeletal: Positive for neck pain. Negative for myalgias.   Neurological: Positive for dizziness, sensory change, weakness and headaches. Negative for focal weakness.   Psychiatric/Behavioral: The patient is not nervous/anxious.         Physical Exam  Temp:  [36.4 °C (97.5 °F)-36.6 °C (97.9 °F)] 36.4 °C (97.6 °F)  Pulse:  [60-94] 61  Resp:  [16-18] 18  BP: (108-135)/(68-84) 126/84  SpO2:  [96 %-98 %] 97 %    Physical Exam  Constitutional:       General: She is not in acute distress.  HENT:      Head: Normocephalic and atraumatic.      Nose: Nose normal.      Mouth/Throat:      Mouth: Mucous membranes are moist.      Pharynx: No posterior oropharyngeal erythema.   Eyes:      General: No scleral icterus.     Extraocular Movements: Extraocular movements intact.      Conjunctiva/sclera: Conjunctivae normal.      Pupils: Pupils are equal, round, and reactive to light.   Neck:      Musculoskeletal: Normal range of motion and neck supple. No neck rigidity.   Cardiovascular:      Rate and Rhythm: Normal rate and regular rhythm.      Pulses: Normal pulses.      Heart sounds: Normal heart sounds. No murmur. No gallop.    Pulmonary:      Effort: Pulmonary effort is normal.      Breath sounds: Normal breath sounds. No stridor. No wheezing, rhonchi or rales.   Abdominal:      General: Bowel sounds are normal.      Palpations: Abdomen is soft.   Musculoskeletal:         General: No swelling or tenderness.      Comments: BLAINE KINGSLEY   Skin:     General: Skin is warm.   Neurological:      General: No focal deficit present.      Mental Status: She is alert and oriented to person, place, and time.   Psychiatric:         Mood and Affect: Mood normal.         Behavior:  Behavior normal.         Fluids    Intake/Output Summary (Last 24 hours) at 1/24/2021 1159  Last data filed at 1/24/2021 0922  Gross per 24 hour   Intake 600 ml   Output --   Net 600 ml       Laboratory  Recent Labs     01/22/21  1254 01/23/21  0255 01/24/21  0243   WBC 3.1* 3.5* 3.7*   RBC 5.29 4.64 4.37   HEMOGLOBIN 15.1 13.2 12.6   HEMATOCRIT 46.5 41.1 38.5   MCV 87.9 88.6 88.1   MCH 28.5 28.4 28.8   MCHC 32.5* 32.1* 32.7*   RDW 40.0 40.9 40.2   PLATELETCT 185 159* 170   MPV 10.8 10.8 10.9     Recent Labs     01/22/21  1254 01/23/21  0255 01/24/21  0243 01/24/21  0819   SODIUM 134* 134* 133*  --    POTASSIUM 4.5 5.3 6.1* 5.6*   CHLORIDE 89* 93* 91*  --    CO2 29 25 23  --    GLUCOSE 71 76 76  --    BUN 35* 54* 76*  --    CREATININE 5.98* 8.44* 11.02*  --    CALCIUM 9.8 8.7 8.9  --              Recent Labs     01/22/21  1254   TRIGLYCERIDE 105   HDL 74   LDL 91       Imaging  CT-HEAD W/O   Final Result      No evidence of acute intracranial process.      EC-ECHOCARDIOGRAM COMPLETE W/O CONT   Final Result      DX-CHEST-PORTABLE (1 VIEW)   Final Result      No radiographic evidence of acute cardiopulmonary process.      NM-CARDIAC STRESS TEST    (Results Pending)        Assessment/Plan  Pain in the chest- (present on admission)  Assessment & Plan  Patient with a 3-day history of intermittent chest pressure/pain patient does have cardiac risk equivalent in the form of end-stage renal disease dialysis dependent.  Patient has never had apparently this pain in the past.    Tele monitoring  TSH, A1c, lipid profile- [facility]  Echocardiogram showed ejection fraction 65%, Hyperdynamic left ventricular systolic function.  Trace tricuspid regurgitation.Estimated right ventricular systolic pressure is 33 mmHg.  Pending Stress test      ESRD (end stage renal disease) on dialysis (HCC)- (present on admission)  Assessment & Plan  Patient had hemodialysis apparently today at this morning for which she had increased malaise and  generalized weakness after dialysis which was different than she usually has.  HD on  Monday Wednesday Friday.     Pt's AM K is 6.1, she refused insulin & dextrose.   Extensive discussion with the pt regarding effects of HyperK & she still refused IV medications for that.   Nephrology was consulted & discussed with Dr Merritt- plan for HD today.       Leukopenia- (present on admission)  Assessment & Plan  Patient with leukopenia in the setting of immunocompromise and ESRD.  No intervention indicated this time   Continue to monitor.    History of lupus nephritis- (present on admission)  Assessment & Plan  Patient claims her lupus nephritis has been in remission although this is very unclear  Patient does not appear to be an acute flare at this time  Needs outpatient follow up       VTE prophylaxis: Heparin

## 2021-01-25 ENCOUNTER — PATIENT OUTREACH (OUTPATIENT)
Dept: HEALTH INFORMATION MANAGEMENT | Facility: OTHER | Age: 41
End: 2021-01-25

## 2021-01-25 ENCOUNTER — APPOINTMENT (OUTPATIENT)
Dept: RADIOLOGY | Facility: MEDICAL CENTER | Age: 41
End: 2021-01-25
Attending: STUDENT IN AN ORGANIZED HEALTH CARE EDUCATION/TRAINING PROGRAM
Payer: MEDICAID

## 2021-01-25 VITALS
BODY MASS INDEX: 21.49 KG/M2 | WEIGHT: 128.97 LBS | TEMPERATURE: 97.4 F | HEART RATE: 74 BPM | HEIGHT: 65 IN | OXYGEN SATURATION: 96 % | DIASTOLIC BLOOD PRESSURE: 84 MMHG | RESPIRATION RATE: 17 BRPM | SYSTOLIC BLOOD PRESSURE: 114 MMHG

## 2021-01-25 PROBLEM — R07.9 PAIN IN THE CHEST: Status: RESOLVED | Noted: 2020-02-18 | Resolved: 2021-01-25

## 2021-01-25 LAB
ANION GAP SERPL CALC-SCNC: 19 MMOL/L (ref 7–16)
BUN SERPL-MCNC: 67 MG/DL (ref 8–22)
CALCIUM SERPL-MCNC: 8.7 MG/DL (ref 8.4–10.2)
CHLORIDE SERPL-SCNC: 95 MMOL/L (ref 96–112)
CO2 SERPL-SCNC: 26 MMOL/L (ref 20–33)
CREAT SERPL-MCNC: 9.99 MG/DL (ref 0.5–1.4)
GLUCOSE SERPL-MCNC: 61 MG/DL (ref 65–99)
POTASSIUM SERPL-SCNC: 5.4 MMOL/L (ref 3.6–5.5)
SODIUM SERPL-SCNC: 140 MMOL/L (ref 135–145)

## 2021-01-25 PROCEDURE — G0378 HOSPITAL OBSERVATION PER HR: HCPCS

## 2021-01-25 PROCEDURE — 93018 CV STRESS TEST I&R ONLY: CPT | Performed by: INTERNAL MEDICINE

## 2021-01-25 PROCEDURE — 93017 CV STRESS TEST TRACING ONLY: CPT | Performed by: STUDENT IN AN ORGANIZED HEALTH CARE EDUCATION/TRAINING PROGRAM

## 2021-01-25 PROCEDURE — 99217 PR OBSERVATION CARE DISCHARGE: CPT | Performed by: STUDENT IN AN ORGANIZED HEALTH CARE EDUCATION/TRAINING PROGRAM

## 2021-01-25 PROCEDURE — 700102 HCHG RX REV CODE 250 W/ 637 OVERRIDE(OP): Performed by: STUDENT IN AN ORGANIZED HEALTH CARE EDUCATION/TRAINING PROGRAM

## 2021-01-25 PROCEDURE — A9270 NON-COVERED ITEM OR SERVICE: HCPCS | Performed by: STUDENT IN AN ORGANIZED HEALTH CARE EDUCATION/TRAINING PROGRAM

## 2021-01-25 PROCEDURE — 90935 HEMODIALYSIS ONE EVALUATION: CPT

## 2021-01-25 PROCEDURE — 80048 BASIC METABOLIC PNL TOTAL CA: CPT

## 2021-01-25 RX ADMIN — CALCIUM CARBONATE (ANTACID) CHEW TAB 500 MG 500 MG: 500 CHEW TAB at 06:03

## 2021-01-25 NOTE — PROCEDURES
Diagnosis: End-Stage Renal Disease admitted with chest pain. Patient seen and examined on hemodialysis during treatment. Patient is stable, tolerating hemodialysis. Denies chest pain and shortness of breath. Orders updated as needed. Please refer to flowsheet for full details.    Access: Left RC AVF  UF goal: 1L    Plan: Continue HD Monday Wednesday Friday. Stress test negative for ischemia. OK to discharge from Nephrology standpoint.  There is no need for outpatient nephrology clinic follow up appointment, as the patient will be seen by a nephrologist at their outpatient dialysis center.     Jessee Shaw MD  Nephrology

## 2021-01-25 NOTE — DISCHARGE SUMMARY
"Discharge Summary    CHIEF COMPLAINT ON ADMISSION  Chief Complaint   Patient presents with   • Chest Pressure   • Shortness of Breath       Reason for Admission  chest pain, high heart rate     Admission Date  1/22/2021    CODE STATUS  Full Code    HPI & HOSPITAL COURSE  40 y.o. 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  With the past 3 days patient has been having intermittent substernal chest pressure/pain worsened with exertion sometimes alleviated by rest nonradiating.    Emergency department evaluation revealed a troponin that was mildly elevated at 37 . EKG did not show any marked ST elevations.  Labs were reflective of ESRD.  Ordered ECHO & Stress test.   ECHO showed \"EF 65% with  Normal left ventricular chamber size.Hyperdynamic left ventricular systolic function.Trace tricuspid regurgitation.Estimated right ventricular systolic pressure is 33 mmHg. \"  Stress test done & pt refused to get nuclear part of stress test, stress test showed EF 40% with Exercise induced inferior wall akinesis. Stress test result in inconclusive since it is not complete & pt has baseline repolarization abnormalities related to ESRD. Pt was also seen by Nephrology while inpatient & got HD on 1/24 & 1/25.    Pt will be d/c home with PCP follow up at [facility] & outpatient cardiology follow up.   Continue Outpatient HD MWF.     Therefore, she is discharged in fair and stable condition to home with close outpatient follow-up.    The patient met 2-midnight criteria for an inpatient stay at the time of discharge.    Discharge Date  1/25/2021    FOLLOW UP ITEMS POST DISCHARGE  PCP follow up at [facility] & outpatient cardiology follow up.   Continue Outpatient HD MWF.       DISCHARGE DIAGNOSES  Active Problems:    Leukopenia POA: Yes    ESRD (end stage renal " "disease) on dialysis (HCC) POA: Yes      Overview: HD this morning for recurrent hyperkalemia    History of lupus nephritis POA: Yes  Resolved Problems:    Pain in the chest POA: Yes      FOLLOW UP  No future appointments.  [address] [id number] [phone]    Please call to schedule and establish with a primary care provider. Thank you.    [facility]    Schedule an appointment as soon as possible for a visit in 1 week  As needed for chest pain   Please call  for [facility] Cardiology Appointment     MEDICATIONS ON DISCHARGE     Medication List      CONTINUE taking these medications      Instructions   calcium carbonate 500 MG Chew  Commonly known as: TUMS   Chew 500 mg 3 times a day.  Dose: 500 mg     metoprolol tartrate 50 MG Tabs  Commonly known as: LOPRESSOR   TAKE 1 TABLET BY MOUTH TWICE A DAY     ONE-A-DAY WOMENS PO   Take 1 Tab by mouth 3 times a week. Monday, Wednesday, Friday  Dose: 1 Tab            Allergies  Allergies   Allergen Reactions   • Labetalol Palpitations     Fast heart rate, rash, HTN   • Iodine Rash     Rash   • Morphine Itching     Pt states that she can tolerate small dose of morphine (can tolerate up to 2mg dosage).   • Pork Allergy Itching     Itchy skin       DIET  Orders Placed This Encounter   Procedures   • Diet Order Diet: Renal     Standing Status:   Standing     Number of Occurrences:   1     Order Specific Question:   Diet:     Answer:   Renal [8]       ACTIVITY  As tolerated.  Weight bearing as tolerated    CONSULTATIONS  None     PROCEDURES  \"EF 65% with  Normal left ventricular chamber size.Hyperdynamic left ventricular systolic function.Trace tricuspid regurgitation.Estimated right ventricular systolic pressure is 33 mmHg. \"    LABORATORY  Lab Results   Component Value Date    SODIUM 140 01/25/2021    POTASSIUM 5.4 01/25/2021    CHLORIDE 95 (L) 01/25/2021    CO2 26 01/25/2021    " GLUCOSE 61 (L) 01/25/2021    BUN 67 (H) 01/25/2021    CREATININE 9.99 (HH) 01/25/2021    CREATININE 3.0 (H) 10/14/2008        Lab Results   Component Value Date    WBC 3.7 (L) 01/24/2021    HEMOGLOBIN 12.6 01/24/2021    HEMATOCRIT 38.5 01/24/2021    PLATELETCT 170 01/24/2021        Total time of the discharge process exceeds 45 minutes.

## 2021-01-25 NOTE — DISCHARGE INSTRUCTIONS
Discharge Instructions    Discharged to home by car with relative. Discharged via wheelchair, hospital escort: Yes.  Special equipment needed: Not Applicable    Be sure to schedule a follow-up appointment with your primary care doctor or any specialists as instructed.     Discharge Plan:   Diet Plan: Discussed  Activity Level: Discussed  Confirmed Follow up Appointment: Patient to Call and Schedule Appointment  Confirmed Symptoms Management: Discussed  Medication Reconciliation Updated: Yes  Influenza Vaccine Indication: Indicated: 9 to 64 years of age    I understand that a diet low in cholesterol, fat, and sodium is recommended for good health. Unless I have been given specific instructions below for another diet, I accept this instruction as my diet prescription.   Other diet: renal, cardiac with 1500ml fluid restriction     Special Instructions: None    · Is patient discharged on Warfarin / Coumadin?   No       Ergometría cardiopulmonar  Cardiopulmonary Exercise Stress Test  La ergometría cardiopulmonar (CPET) es un estudio en el que se controla cómo reaccionan el corazón y los pulmones al hacer ejercicio. Hundred se denomina “capacidad de ejercicio”. Kary rinku estudio, deberá caminar o correr sobre amisha cinta caminadora o pedalear en amisha bicicleta fija. Mientras camine o corra, le harán estudios en el corazón y los pulmones. También pueden hacerle rinku estudio para:  · Averiguar por qué le falta el aire.  · Comprobar si tiene intolerancia al ejercicio.  · Myron cómo funcionan los pulmones.  · Myron cómo funciona el corazón.  · Verificar si el corazón o los pulmones están respondiendo a los tratamientos.  · Verificar si tiene un problema cardíaco o pulmonar.  · Verificar si está lo suficientemente kennedy daja para someterse a amisha cirugía.  Consulte al médico acerca de lo siguiente:  · Cualquier alergia que tenga.  · Todos los medicamentos que coral.  · Cualquier problema previo que usted o algún miembro de sher shirley  hayan tenido con los anestésicos.  · Cualquier trastorno de la tavo que tenga.  · Cirugías a las que se sometió.  · Cualquier afección médica que tenga.  · Si está embarazada o podría estarlo.  ¿Cuáles son los riesgos?  Por lo general, se trata de un estudio seguro. Sin embargo, pueden ocurrir complicaciones, por ejemplo:  · Dolor en el pecho.  · Falta de aire.  · Dolor en las piernas.  · Latidos cardíacos irregulares.  ¿Qué ocurre antes del procedimiento?  · Siga las instrucciones del médico respecto de lo que no puede comer o beber.  · Consulte al médico si debe cambiar o suspender paxton medicamentos habituales.  · Use ropa y calzado cómodo y suelto.  · Si usa un inhalador, tráigalo en el momento del estudio.  ¿Qué ocurre leidy el procedimiento?    · Se le colocará un tensiómetro en el brazo.  · Le colocarán parches (electrodos) adhesivos en el pecho. Estos electrodos se conectarán a amisha máquina de ECG.  · Le colocarán en el dedo un monitor con forma de pinza que muestra la cantidad de oxígeno en la tavo (pulsioxímetro).  · Le colocarán un clip en la nariz y amisha boquilla en la boca. Es posible que se sujete en sher lugar con un arnés para la haleigh. Leidy el estudio, deberá respirar por la boquilla.  · Se le pedirá que comience a hacer ejercicio. Lo observarán atentamente mientras hace ejercicio.  · La cantidad de esfuerzo para realizar el ejercicio se aumentará lentamente.  · Mientras krishna ejercicio, en el estudio se medirá lo siguiente:  ? La frecuencia cardíaca.  ? El ritmo cardíaco.  ? El nivel de oxígeno en la tavo.  ? La cantidad de oxígeno y dióxido de carbono que exhala.  · El estudio finalizará cuando:  ? Haya finalizado la prueba.  ? Haya alcanzado sher capacidad máxima para hacer ejercicio.  ? Sienta dolor en el pecho o en las piernas, se sienta mareado o le falte el aire.  El estudio puede variar según el médico y el hospital.  ¿Qué puedo esperar después de la prueba?  Le controlarán la presión  arterial, la frecuencia cardíaca, la frecuencia respiratoria y el nivel de oxígeno en la tavo hasta que le den el kendrick del hospital o la clínica.  Resumen  · La ergometría cardiopulmonar (CPET) es un estudio en el que se controla cómo reaccionan el corazón y los pulmones al hacer ejercicio.  · Siga las instrucciones del médico respecto de los alimentos y las bebidas y qué medicamentos cambiar o suspender.  · Kary rinku estudio, deberá caminar o correr sobre amisha cinta caminadora o pedalear en amisha bicicleta fija. El estudio se realizará mientras corre o camina.  Esta información no tiene daja fin reemplazar el consejo del médico. Asegúrese de hacerle al médico cualquier pregunta que tenga.  Document Released: 01/20/2012 Document Revised: 04/07/2020 Document Reviewed: 04/07/2020  [employer] Patient Education © 2020 [employer]          Depression / Suicide Risk    As you are discharged from this [facility] facility, it is important to learn how to keep safe from harming yourself.    Recognize the warning signs:  · Abrupt changes in personality, positive or negative- including increase in energy   · Giving away possessions  · Change in eating patterns- significant weight changes-  positive or negative  · Change in sleeping patterns- unable to sleep or sleeping all the time   · Unwillingness or inability to communicate  · Depression  · Unusual sadness, discouragement and loneliness  · Talk of wanting to die  · Neglect of personal appearance   · Rebelliousness- reckless behavior  · Withdrawal from people/activities they love  · Confusion- inability to concentrate     If you or a loved one observes any of these behaviors or has concerns about self-harm, here's what you can do:  · Talk about it- your feelings and reasons for harming yourself  · Remove any means that you might use to hurt yourself (examples: pills, rope, extension cords, firearm)  · Get professional help from the community (Mental Health, Substance  Abuse, psychological counseling)  · Do not be alone:Call your Safe Contact- someone whom you trust who will be there for you.  · Call your local CRISIS HOTLINE 289-3941 or 253-768-0470  · Call your local Children's Mobile Crisis Response Team [place] (981) 890-5506 or www.[employer]  · Call the toll free National Suicide Prevention Hotlines   · National Suicide Prevention Lifeline 231-940-BMGO (3310)  · National Hope Line Network 800-SUICIDE (624-2629)

## 2021-01-25 NOTE — PROGRESS NOTES
"Nuclear Med called with update that with interprutation services, pt sttd uncomfortable with nuclear part of test and wants to proceed with treadmill portion only.  Md on floor, advised, and stated \"ok what can we do if she wont have it.\"  Nuc med spoke with doctor     "

## 2021-01-25 NOTE — CONSULTS
DATE OF SERVICE:  2021     NEPHROLOGY CONSULTATION     REQUESTING PHYSICIAN:  Nany Johnson MD     REASON FOR CONSULTATION:  To evaluate and provide dialysis for patient with   end-stage renal disease, hyperkalemia.     HISTORY OF PRESENT ILLNESS:  The patient is a 40-year-old female with   end-stage renal disease, on hemodialysis, who has been receiving her dialysis   treatments in [facility] on Monday, Wednesday, and Friday.  She presented   to the emergency room on  with complaints of substernal chest pain.  She   has been receiving her dialysis treatments on Monday, Wednesday, and Friday   and last dialysis treatment completed on Friday, found hyperkalemia of 6.1,   scheduled for dialysis today.  Currently is doing well, tolerating dialysis   well.  Denies any chest pain.  No shortness of breath.  No nausea or vomiting.     REVIEW OF SYSTEMS:    GENERAL:  No fever or chills.  No diaphoresis.  No fatigue.  HEENT:  No sinus pain.  No sore throat.  No nosebleeds.  EYES:  No double or blurry vision.  No pain.  NECK:  No pain.  No stiffness.  RESPIRATORY:  No cough.  No shortness of breath.  No wheezing or stridor.  CARDIOVASCULAR:  No swellings.  No dyspnea.  No chest pain.  No palpitations.  GASTROINTESTINAL:  No nausea, vomiting, diarrhea.  GENITOURINARY:  No flank pain, frequency, or urgency.  MUSCULOSKELETAL:  No back pain, joint pain, myalgias, or neck pain.  SKIN:  No itching or rash.  NEUROLOGIC:  No seizures, weakness, or headaches.     All other systems reviewed; all negative.     PAST MEDICAL HISTORY:  End-stage renal disease, on hemodialysis, hypertension,   lupus nephritis.     SURGICAL HISTORY:  .     FAMILY HISTORY:  No history of kidney disease.     SOCIAL HISTORY:  No tobacco, alcohol, or drug use.     ALLERGIES:  ALLERGIC TO LABETALOL, IODINE, MORPHINE, _____ ALLERGY.     MEDICATIONS:  Outpatient medications reviewed.     PHYSICAL EXAMINATION:  VITAL SIGNS:  Blood pressure  135/94, heart rate 70, temperature 36.6 Celsius.  GENERAL APPEARANCE:  Well-developed, well-nourished female in no acute   distress.  HEENT:  Normocephalic, atraumatic.  Pupils equal, round, reactive to light.    Extraocular movement intact.  Nares patent.  Oropharynx clear, moist mucosa,   no erythema or exudate.  NECK:  Supple, no lymphadenopathy, no thyromegaly appreciated.  LUNGS:  Clear to auscultation bilaterally.  No rales, wheezes, no rhonchi.  HEART:  Regular rhythm.  No rub or gallop.  ABDOMEN:  Soft, nontender, nondistended.  Bowel sounds present.  No palpable   mass.  EXTREMITIES:  No cyanosis, no clubbing, no edema.  NEUROLOGICAL:  Alert and oriented x3, no focal deficit.  Cranial nerves II-XII   grossly intact.     LABORATORY RESULTS:  Reviewed and revealed hemoglobin 12.6.  Sodium 133,   potassium 6.1, BUN 76, and creatinine 11.0.     ASSESSMENT AND PLAN:  The patient is a 40-year-old female with end-stage renal   disease, on hemodialysis, admitted with chest pain.  1.  End-stage renal disease, scheduled dialysis today, then continue on her   schedule Monday, Wednesday, and Friday.   2.  Electrolytes, hyperkalemia will be correcting with dialysis.  3.  Hypertension.  Blood pressure remains well-controlled.  4.  Anemia.  Hemoglobin level stable, above the goal.  No need for Epogen at   present time.   5.  Volume well-controlled.     RECOMMENDATIONS:  Renal diet.  All medications, adjust to renal doses.    Emergent dialysis today to correct hyperkalemia, then continue Monday,   Wednesday, Friday.     Thank you for the consult.        ______________________________  MD PARADISE KING/DANIELLE/ISAIAS    DD:  01/24/2021 16:22  DT:  01/24/2021 16:53    Job#:  [id number]

## 2021-01-25 NOTE — PROGRESS NOTES
HD treatment today per routine order using LFAAVF.Noted leg cramping in the last 45 mins of treatment otherwise tolerated treatment well.Ne UF removed 1000 ml.Report given to primary Rn.Waited 30 mins for patient  to get back from Stress Test.

## 2021-01-25 NOTE — PROGRESS NOTES
[facility] Progress Note     HD treatment ordered by Dr Merritt x 2 hours.  Treatment Start time: 1346          End time: 1546       Net UF 2000 ml    Patient tolerated treatment. VS stable all through out. All blood was returned. LUE AVF needle removed. Dry gauze dressing applied and changed without bleeding issue. + Bruit/Thrill pre-post Treatment. See paper flow sheet for details.     Report given to KELLI Nguyen.

## 2021-01-25 NOTE — DISCHARGE PLANNING
Outpatient Dialysis Note    Confirmed patient is active at:    [facility]  [address]  [place] [address]    Schedule: Monday, Wednesday, Friday   Time: 09:45am     Patient is seen by Dr. Shaw in HD clinic.    Spoke with Elma at facility who confirmed.    Forwarded records for review.    Leona Jerry- Dialysis Coordinator  [facility] # [phone]

## 2021-01-27 NOTE — PROGRESS NOTES
Pt was called to discuss more about stress test result & follow up plan.   Tried to call the number listed on the chart & the person who answered the phone kept hanging up.

## 2021-03-15 NOTE — CARE PLAN
Problem: Safety  Goal: Will remain free from injury  Note: Non slip socks on, bed in low position, 2 side rails up, call light within reach, educated on fall risk, verbalizes understanding, will continue to monitor.      Problem: Knowledge Deficit  Goal: Knowledge of disease process/condition, treatment plan, diagnostic tests, and medications will improve  Note: Patient updated on plan of care for the day, patient verbalized understanding. All questions and concerns addressed at this time.       Continue with Mechanical Soft Solids and Thin Liquids

## 2021-08-23 RX ORDER — LOSARTAN POTASSIUM 25 MG/1
TABLET ORAL
Qty: 30 TABLET | Refills: 0 | Status: SHIPPED | OUTPATIENT
Start: 2021-08-23 | End: 2021-09-08

## 2021-09-08 RX ORDER — LOSARTAN POTASSIUM 100 MG/1
100 TABLET ORAL DAILY
Qty: 90 TABLET | Refills: 3 | Status: SHIPPED | OUTPATIENT
Start: 2021-09-08 | End: 2021-12-15

## 2021-09-16 ENCOUNTER — HOSPITAL ENCOUNTER (INPATIENT)
Facility: MEDICAL CENTER | Age: 41
LOS: 1 days | DRG: 304 | End: 2021-09-17
Attending: EMERGENCY MEDICINE | Admitting: INTERNAL MEDICINE
Payer: MEDICAID

## 2021-09-16 ENCOUNTER — APPOINTMENT (OUTPATIENT)
Dept: RADIOLOGY | Facility: MEDICAL CENTER | Age: 41
DRG: 304 | End: 2021-09-16
Attending: NURSE PRACTITIONER
Payer: MEDICAID

## 2021-09-16 ENCOUNTER — APPOINTMENT (OUTPATIENT)
Dept: CARDIOLOGY | Facility: MEDICAL CENTER | Age: 41
DRG: 304 | End: 2021-09-16
Attending: STUDENT IN AN ORGANIZED HEALTH CARE EDUCATION/TRAINING PROGRAM
Payer: MEDICAID

## 2021-09-16 ENCOUNTER — APPOINTMENT (OUTPATIENT)
Dept: RADIOLOGY | Facility: MEDICAL CENTER | Age: 41
DRG: 304 | End: 2021-09-16
Attending: EMERGENCY MEDICINE
Payer: MEDICAID

## 2021-09-16 DIAGNOSIS — I16.0 HYPERTENSIVE URGENCY: ICD-10-CM

## 2021-09-16 DIAGNOSIS — I63.9 ACUTE CVA (CEREBROVASCULAR ACCIDENT) (HCC): ICD-10-CM

## 2021-09-16 DIAGNOSIS — R53.1 WEAKNESS: ICD-10-CM

## 2021-09-16 DIAGNOSIS — R07.9 CHEST PAIN, UNSPECIFIED TYPE: ICD-10-CM

## 2021-09-16 PROBLEM — M32.9 LUPUS (HCC): Status: ACTIVE | Noted: 2021-09-16

## 2021-09-16 PROBLEM — M32.9 LUPUS (HCC): Status: RESOLVED | Noted: 2021-09-16 | Resolved: 2021-09-16

## 2021-09-16 LAB
ABO GROUP BLD: NORMAL
ALBUMIN SERPL BCP-MCNC: 4.7 G/DL (ref 3.2–4.9)
ALBUMIN/GLOB SERPL: 1.5 G/DL
ALP SERPL-CCNC: 81 U/L (ref 30–99)
ALT SERPL-CCNC: 47 U/L (ref 2–50)
ANION GAP SERPL CALC-SCNC: 14 MMOL/L (ref 7–16)
APTT PPP: 32.7 SEC (ref 24.7–36)
AST SERPL-CCNC: 28 U/L (ref 12–45)
BASOPHILS # BLD AUTO: 1.2 % (ref 0–1.8)
BASOPHILS # BLD: 0.04 K/UL (ref 0–0.12)
BILIRUB SERPL-MCNC: 0.5 MG/DL (ref 0.1–1.5)
BLD GP AB SCN SERPL QL: NORMAL
BUN SERPL-MCNC: 15 MG/DL (ref 8–22)
C3 SERPL-MCNC: 71.6 MG/DL (ref 87–200)
C4 SERPL-MCNC: 18.1 MG/DL (ref 19–52)
CALCIUM SERPL-MCNC: 9.9 MG/DL (ref 8.5–10.5)
CHLORIDE SERPL-SCNC: 93 MMOL/L (ref 96–112)
CO2 SERPL-SCNC: 29 MMOL/L (ref 20–33)
CREAT SERPL-MCNC: 4.25 MG/DL (ref 0.5–1.4)
CRP SERPL HS-MCNC: <0.3 MG/DL (ref 0–0.75)
EKG IMPRESSION: NORMAL
EOSINOPHIL # BLD AUTO: 0.13 K/UL (ref 0–0.51)
EOSINOPHIL NFR BLD: 3.8 % (ref 0–6.9)
ERYTHROCYTE [DISTWIDTH] IN BLOOD BY AUTOMATED COUNT: 39.6 FL (ref 35.9–50)
ERYTHROCYTE [SEDIMENTATION RATE] IN BLOOD BY WESTERGREN METHOD: 9 MM/HOUR (ref 0–25)
EST. AVERAGE GLUCOSE BLD GHB EST-MCNC: 91 MG/DL
GLOBULIN SER CALC-MCNC: 3.1 G/DL (ref 1.9–3.5)
GLUCOSE SERPL-MCNC: 78 MG/DL (ref 65–99)
HBA1C MFR BLD: 4.8 % (ref 4–5.6)
HCT VFR BLD AUTO: 39.2 % (ref 37–47)
HGB BLD-MCNC: 12.9 G/DL (ref 12–16)
IMM GRANULOCYTES # BLD AUTO: 0.01 K/UL (ref 0–0.11)
IMM GRANULOCYTES NFR BLD AUTO: 0.3 % (ref 0–0.9)
INR PPP: 1.05 (ref 0.87–1.13)
LV EJECT FRACT  99904: 60
LV EJECT FRACT MOD 2C 99903: 50.23
LV EJECT FRACT MOD 4C 99902: 67.82
LV EJECT FRACT MOD BP 99901: 60.31
LYMPHOCYTES # BLD AUTO: 0.73 K/UL (ref 1–4.8)
LYMPHOCYTES NFR BLD: 21.1 % (ref 22–41)
MAGNESIUM SERPL-MCNC: 2.3 MG/DL (ref 1.5–2.5)
MCH RBC QN AUTO: 28 PG (ref 27–33)
MCHC RBC AUTO-ENTMCNC: 32.9 G/DL (ref 33.6–35)
MCV RBC AUTO: 85.2 FL (ref 81.4–97.8)
MONOCYTES # BLD AUTO: 0.25 K/UL (ref 0–0.85)
MONOCYTES NFR BLD AUTO: 7.2 % (ref 0–13.4)
NEUTROPHILS # BLD AUTO: 2.3 K/UL (ref 2–7.15)
NEUTROPHILS NFR BLD: 66.4 % (ref 44–72)
NRBC # BLD AUTO: 0 K/UL
NRBC BLD-RTO: 0 /100 WBC
PLATELET # BLD AUTO: 128 K/UL (ref 164–446)
PMV BLD AUTO: 10.8 FL (ref 9–12.9)
POTASSIUM SERPL-SCNC: 3.9 MMOL/L (ref 3.6–5.5)
PROT SERPL-MCNC: 7.8 G/DL (ref 6–8.2)
PROTHROMBIN TIME: 13.4 SEC (ref 12–14.6)
RBC # BLD AUTO: 4.6 M/UL (ref 4.2–5.4)
RH BLD: NORMAL
SODIUM SERPL-SCNC: 136 MMOL/L (ref 135–145)
TROPONIN T SERPL-MCNC: 34 NG/L (ref 6–19)
WBC # BLD AUTO: 3.5 K/UL (ref 4.8–10.8)

## 2021-09-16 PROCEDURE — 96375 TX/PRO/DX INJ NEW DRUG ADDON: CPT

## 2021-09-16 PROCEDURE — A9270 NON-COVERED ITEM OR SERVICE: HCPCS | Performed by: STUDENT IN AN ORGANIZED HEALTH CARE EDUCATION/TRAINING PROGRAM

## 2021-09-16 PROCEDURE — 93306 TTE W/DOPPLER COMPLETE: CPT | Mod: 26 | Performed by: INTERNAL MEDICINE

## 2021-09-16 PROCEDURE — 86900 BLOOD TYPING SEROLOGIC ABO: CPT

## 2021-09-16 PROCEDURE — 700111 HCHG RX REV CODE 636 W/ 250 OVERRIDE (IP): Performed by: EMERGENCY MEDICINE

## 2021-09-16 PROCEDURE — 80053 COMPREHEN METABOLIC PANEL: CPT

## 2021-09-16 PROCEDURE — 36415 COLL VENOUS BLD VENIPUNCTURE: CPT

## 2021-09-16 PROCEDURE — 70450 CT HEAD/BRAIN W/O DYE: CPT

## 2021-09-16 PROCEDURE — 99223 1ST HOSP IP/OBS HIGH 75: CPT | Mod: GC | Performed by: INTERNAL MEDICINE

## 2021-09-16 PROCEDURE — 83036 HEMOGLOBIN GLYCOSYLATED A1C: CPT

## 2021-09-16 PROCEDURE — 99285 EMERGENCY DEPT VISIT HI MDM: CPT

## 2021-09-16 PROCEDURE — A9270 NON-COVERED ITEM OR SERVICE: HCPCS | Performed by: NURSE PRACTITIONER

## 2021-09-16 PROCEDURE — 700102 HCHG RX REV CODE 250 W/ 637 OVERRIDE(OP): Performed by: STUDENT IN AN ORGANIZED HEALTH CARE EDUCATION/TRAINING PROGRAM

## 2021-09-16 PROCEDURE — 83735 ASSAY OF MAGNESIUM: CPT

## 2021-09-16 PROCEDURE — 86225 DNA ANTIBODY NATIVE: CPT

## 2021-09-16 PROCEDURE — 85025 COMPLETE CBC W/AUTO DIFF WBC: CPT

## 2021-09-16 PROCEDURE — 86039 ANTINUCLEAR ANTIBODIES (ANA): CPT

## 2021-09-16 PROCEDURE — 86901 BLOOD TYPING SEROLOGIC RH(D): CPT

## 2021-09-16 PROCEDURE — 86160 COMPLEMENT ANTIGEN: CPT

## 2021-09-16 PROCEDURE — 85652 RBC SED RATE AUTOMATED: CPT

## 2021-09-16 PROCEDURE — 92610 EVALUATE SWALLOWING FUNCTION: CPT

## 2021-09-16 PROCEDURE — 85730 THROMBOPLASTIN TIME PARTIAL: CPT

## 2021-09-16 PROCEDURE — 85610 PROTHROMBIN TIME: CPT

## 2021-09-16 PROCEDURE — 700111 HCHG RX REV CODE 636 W/ 250 OVERRIDE (IP): Performed by: STUDENT IN AN ORGANIZED HEALTH CARE EDUCATION/TRAINING PROGRAM

## 2021-09-16 PROCEDURE — 93005 ELECTROCARDIOGRAM TRACING: CPT | Performed by: EMERGENCY MEDICINE

## 2021-09-16 PROCEDURE — 96374 THER/PROPH/DIAG INJ IV PUSH: CPT

## 2021-09-16 PROCEDURE — 84484 ASSAY OF TROPONIN QUANT: CPT

## 2021-09-16 PROCEDURE — 700102 HCHG RX REV CODE 250 W/ 637 OVERRIDE(OP): Performed by: NURSE PRACTITIONER

## 2021-09-16 PROCEDURE — 770020 HCHG ROOM/CARE - TELE (206)

## 2021-09-16 PROCEDURE — 70551 MRI BRAIN STEM W/O DYE: CPT

## 2021-09-16 PROCEDURE — 86038 ANTINUCLEAR ANTIBODIES: CPT

## 2021-09-16 PROCEDURE — 99255 IP/OBS CONSLTJ NEW/EST HI 80: CPT | Performed by: PSYCHIATRY & NEUROLOGY

## 2021-09-16 PROCEDURE — 71045 X-RAY EXAM CHEST 1 VIEW: CPT

## 2021-09-16 PROCEDURE — 93306 TTE W/DOPPLER COMPLETE: CPT

## 2021-09-16 PROCEDURE — 86140 C-REACTIVE PROTEIN: CPT

## 2021-09-16 PROCEDURE — 86850 RBC ANTIBODY SCREEN: CPT

## 2021-09-16 RX ORDER — HEPARIN SODIUM 5000 [USP'U]/ML
5000 INJECTION, SOLUTION INTRAVENOUS; SUBCUTANEOUS EVERY 12 HOURS
Status: DISCONTINUED | OUTPATIENT
Start: 2021-09-16 | End: 2021-09-17 | Stop reason: HOSPADM

## 2021-09-16 RX ORDER — LOSARTAN POTASSIUM 50 MG/1
100 TABLET ORAL
Status: DISCONTINUED | OUTPATIENT
Start: 2021-09-16 | End: 2021-09-17 | Stop reason: HOSPADM

## 2021-09-16 RX ORDER — HYDRALAZINE HYDROCHLORIDE 20 MG/ML
10 INJECTION INTRAMUSCULAR; INTRAVENOUS ONCE
Status: COMPLETED | OUTPATIENT
Start: 2021-09-16 | End: 2021-09-16

## 2021-09-16 RX ORDER — TRAMADOL HYDROCHLORIDE 50 MG/1
50 TABLET ORAL EVERY 6 HOURS PRN
Status: DISCONTINUED | OUTPATIENT
Start: 2021-09-16 | End: 2021-09-17 | Stop reason: HOSPADM

## 2021-09-16 RX ORDER — ATORVASTATIN CALCIUM 20 MG/1
20 TABLET, FILM COATED ORAL EVERY EVENING
Status: DISCONTINUED | OUTPATIENT
Start: 2021-09-16 | End: 2021-09-16

## 2021-09-16 RX ORDER — IBUPROFEN 800 MG/1
400 TABLET ORAL EVERY 6 HOURS PRN
Status: DISCONTINUED | OUTPATIENT
Start: 2021-09-16 | End: 2021-09-16

## 2021-09-16 RX ORDER — LISINOPRIL 5 MG/1
5 TABLET ORAL
Status: DISCONTINUED | OUTPATIENT
Start: 2021-09-16 | End: 2021-09-16

## 2021-09-16 RX ORDER — AMOXICILLIN 250 MG
2 CAPSULE ORAL 2 TIMES DAILY
Status: DISCONTINUED | OUTPATIENT
Start: 2021-09-16 | End: 2021-09-17 | Stop reason: HOSPADM

## 2021-09-16 RX ORDER — BISACODYL 10 MG
10 SUPPOSITORY, RECTAL RECTAL
Status: DISCONTINUED | OUTPATIENT
Start: 2021-09-16 | End: 2021-09-17 | Stop reason: HOSPADM

## 2021-09-16 RX ORDER — HYDRALAZINE HYDROCHLORIDE 20 MG/ML
10 INJECTION INTRAMUSCULAR; INTRAVENOUS EVERY 4 HOURS PRN
Status: DISCONTINUED | OUTPATIENT
Start: 2021-09-16 | End: 2021-09-16

## 2021-09-16 RX ORDER — HYDRALAZINE HYDROCHLORIDE 20 MG/ML
10 INJECTION INTRAMUSCULAR; INTRAVENOUS
Status: DISCONTINUED | OUTPATIENT
Start: 2021-09-16 | End: 2021-09-16

## 2021-09-16 RX ORDER — AMLODIPINE BESYLATE 5 MG/1
5 TABLET ORAL
Status: DISCONTINUED | OUTPATIENT
Start: 2021-09-16 | End: 2021-09-17 | Stop reason: HOSPADM

## 2021-09-16 RX ORDER — HYDRALAZINE HYDROCHLORIDE 20 MG/ML
10 INJECTION INTRAMUSCULAR; INTRAVENOUS EVERY 6 HOURS PRN
Status: DISCONTINUED | OUTPATIENT
Start: 2021-09-16 | End: 2021-09-17 | Stop reason: HOSPADM

## 2021-09-16 RX ORDER — LORAZEPAM 2 MG/ML
1 INJECTION INTRAMUSCULAR ONCE
Status: COMPLETED | OUTPATIENT
Start: 2021-09-16 | End: 2021-09-16

## 2021-09-16 RX ORDER — ATORVASTATIN CALCIUM 40 MG/1
40 TABLET, FILM COATED ORAL EVERY EVENING
Status: DISCONTINUED | OUTPATIENT
Start: 2021-09-16 | End: 2021-09-16

## 2021-09-16 RX ORDER — ONDANSETRON 2 MG/ML
4 INJECTION INTRAMUSCULAR; INTRAVENOUS EVERY 4 HOURS PRN
Status: DISCONTINUED | OUTPATIENT
Start: 2021-09-16 | End: 2021-09-17 | Stop reason: HOSPADM

## 2021-09-16 RX ORDER — POLYETHYLENE GLYCOL 3350 17 G/17G
1 POWDER, FOR SOLUTION ORAL
Status: DISCONTINUED | OUTPATIENT
Start: 2021-09-16 | End: 2021-09-17 | Stop reason: HOSPADM

## 2021-09-16 RX ORDER — ACETAMINOPHEN 325 MG/1
650 TABLET ORAL EVERY 6 HOURS PRN
Status: DISCONTINUED | OUTPATIENT
Start: 2021-09-16 | End: 2021-09-17 | Stop reason: HOSPADM

## 2021-09-16 RX ORDER — ASPIRIN 325 MG
325 TABLET ORAL DAILY
Status: DISCONTINUED | OUTPATIENT
Start: 2021-09-16 | End: 2021-09-16

## 2021-09-16 RX ADMIN — AMLODIPINE BESYLATE 5 MG: 5 TABLET ORAL at 17:03

## 2021-09-16 RX ADMIN — DOCUSATE SODIUM 50 MG AND SENNOSIDES 8.6 MG 2 TABLET: 8.6; 5 TABLET, FILM COATED ORAL at 17:03

## 2021-09-16 RX ADMIN — LOSARTAN POTASSIUM 100 MG: 50 TABLET ORAL at 20:39

## 2021-09-16 RX ADMIN — LORAZEPAM 1 MG: 2 INJECTION INTRAMUSCULAR; INTRAVENOUS at 14:39

## 2021-09-16 RX ADMIN — TRAMADOL HYDROCHLORIDE 50 MG: 50 TABLET, FILM COATED ORAL at 23:43

## 2021-09-16 RX ADMIN — HEPARIN SODIUM 5000 UNITS: 5000 INJECTION, SOLUTION INTRAVENOUS; SUBCUTANEOUS at 17:03

## 2021-09-16 RX ADMIN — HYDRALAZINE HYDROCHLORIDE 10 MG: 20 INJECTION INTRAMUSCULAR; INTRAVENOUS at 18:26

## 2021-09-16 RX ADMIN — HYDRALAZINE HYDROCHLORIDE 10 MG: 20 INJECTION INTRAMUSCULAR; INTRAVENOUS at 13:12

## 2021-09-16 RX ADMIN — ASPIRIN 325 MG ORAL TABLET 325 MG: 325 PILL ORAL at 13:23

## 2021-09-16 RX ADMIN — ACETAMINOPHEN 650 MG: 325 TABLET ORAL at 17:07

## 2021-09-16 ASSESSMENT — PAIN DESCRIPTION - PAIN TYPE: TYPE: ACUTE PAIN

## 2021-09-16 ASSESSMENT — FIBROSIS 4 INDEX
FIB4 SCORE: 1.24
FIB4 SCORE: 0.83

## 2021-09-16 ASSESSMENT — ENCOUNTER SYMPTOMS
CHILLS: 0
TINGLING: 1
FEVER: 0
PALPITATIONS: 0
SINUS PAIN: 0
DIZZINESS: 1
HEADACHES: 1
SENSORY CHANGE: 1
FOCAL WEAKNESS: 1
ABDOMINAL PAIN: 0
VOMITING: 0
BRUISES/BLEEDS EASILY: 1
DOUBLE VISION: 1
HEARTBURN: 0
WEAKNESS: 1
SEIZURES: 0
SORE THROAT: 0
MYALGIAS: 0
SHORTNESS OF BREATH: 0
COUGH: 0
LOSS OF CONSCIOUSNESS: 0

## 2021-09-16 NOTE — CONSULTS
"No chief complaint on file.      Problem List Items Addressed This Visit     None        Neurology Stroke Alert Consultation     History of present illness:  This is a 41-year old female with PMhx significant for ESRD on HD (Tuesday/Thursday/Sat), hypertension, Lupus induced nephritis who presented to [facility] on 21 for a chief complaint of Left sided weakness/numbness. The patient was reportedly at HD this morning at 1045 when she suddenly developed LUE/LLE weakness, numbness and Left facial weakness. At that time was also noted to develop SBP > 200. EMS called; Stroke Pre Alert called en route. At time of presentation here at 1154, BG 87, SBP 220s. Stat CT head revealed no acute intracranial abnormality. CTA was not pursued given elevated Cr (Dialysis patient). Patient was ultimately determined not to be a candidate for IV tPA given mild/non disabling deficits, patient also reports that she has experienced similar symptoms in the past/while in HD/in setting of hypertensive crisis.   Currently, patient is laying in stretcher; awake and alert; admits to moderately severe headache posteriorly; she denies problem with vision, speech or swallowing. Admits to mild mid/sub sternal chest pain; EKG pending. She denies SOB, nausea or vomiting.     Neurology has been consulted by Dr. Laura Yu to further evaluate the findings noted above.     Past medical history:   Past Medical History:   Diagnosis Date   • Dialysis patient ([facility])    • EKG abnormality     borderline prolonged QTC.   • ESRD (end stage renal disease) on dialysis ([facility])    • Hypertension    • Lupus nephritis, ISN/RPS class IV ([facility])        Past surgical history:   Past Surgical History:   Procedure Laterality Date   • OTHER CARDIAC SURGERY      \"liquid drained from heart\", per pt   • PRIMARY C SECTION       (of some type)       Family history:   Family History   Problem Relation Age of Onset   • Kidney Disease Neg Hx  "       Social history:   Social History     Socioeconomic History   • Marital status:      Spouse name: Not on file   • Number of children: Not on file   • Years of education: Not on file   • Highest education level: Not on file   Occupational History   • Not on file   Tobacco Use   • Smoking status: Never Smoker   • Smokeless tobacco: Never Used   Vaping Use   • Vaping Use: Never used   Substance and Sexual Activity   • Alcohol use: No   • Drug use: No   • Sexual activity: Not on file   Other Topics Concern   • Not on file   Social History Narrative   • Not on file     Social Determinants of Health     Financial Resource Strain:    • Difficulty of Paying Living Expenses:    Food Insecurity:    • Worried About Running Out of Food in the Last Year:    • Ran Out of Food in the Last Year:    Transportation Needs:    • Lack of Transportation (Medical):    • Lack of Transportation (Non-Medical):    Physical Activity:    • Days of Exercise per Week:    • Minutes of Exercise per Session:    Stress:    • Feeling of Stress :    Social Connections:    • Frequency of Communication with Friends and Family:    • Frequency of Social Gatherings with Friends and Family:    • Attends Alevism Services:    • Active Member of Clubs or Organizations:    • Attends Club or Organization Meetings:    • Marital Status:    Intimate Partner Violence:    • Fear of Current or Ex-Partner:    • Emotionally Abused:    • Physically Abused:    • Sexually Abused:        Current medications:   No current facility-administered medications for this encounter.     Current Outpatient Medications   Medication   • losartan (COZAAR) 100 MG Tab   • Multiple Vitamins-Minerals (ONE-A-DAY WOMENS PO)   • calcium carbonate (TUMS) 500 MG Chew Tab   • metoprolol (LOPRESSOR) 50 MG Tab       Medication Allergy:  Allergies   Allergen Reactions   • Labetalol Palpitations     Fast heart rate, rash, HTN   • Iodine Rash     Rash   • Morphine Itching     Pt states  "that she can tolerate small dose of morphine (can tolerate up to 2mg dosage).   • Pork Allergy Itching     Itchy skin       Review of systems:   Constitutional: denies fever, night sweats, weight loss.   Eyes: denies acute vision change, eye pain or secretion.   Ears, Nose, Mouth, Throat: denies nasal secretion, nasal bleeding, difficulty swallowing, hearing loss, tinnitus, vertigo, ear pain, acute dental problems, oral ulcers or lesions.   Endocrine: denies recent weight changes, heat or cold intolerance, polyuria, polydypsia, polyphagia,abnormal hair growth.  Cardiovascular: denies new onset of chest pain, palpitations, syncope, or dyspnea of exertion.  Pulmonary: denies shortness of breath, new onset of cough, hemoptysis, wheezing, chest pain or flu-like symptoms.   GI: denies nausea, vomiting, diarrhea, GI bleeding, change in appetite, abdominal pain, and change in bowel habits.  : denies dysuria, urinary incontinence, hematuria.  Heme/oncology: denies history of easy bruising or bleeding. No history of cancer, DVTor PE.  Allergy/immunology: denies hives/urticaria, or itching.   Dermatologic: denies new rash, or new skin lesions.  Musculoskeletal:denies joint swelling or pain, muscle pain, neck and back pain.   Neurologic:As noted in detail above.   Psychiatric: denies symptoms of depression, anxiety, hallucinations, mood swings or changes, suicidal or homicidal thoughts.     Physical examination:   Vitals:    09/16/21 1200   Weight: 56.2 kg (123 lb 14.4 oz)   Height: 1.676 m (5' 6\")     General: Patient in no acute distress, pleasant and cooperative.  HEENT: Normocephalic, no signs of acute trauma.   Neck: supple, no meningeal signs or carotid bruits. There is normal range of motion. No tenderness on exam.   Chest: clear to auscultation. No cough.   CV: RRR, no murmurs.   Skin: no signs of acute rashes or trauma.   Musculoskeletal: joints exhibit full range of motion, without any pain to palpation. There are " no signs of joint or muscle swelling. There is no tenderness to deep palpation of muscles.   Psychiatric: No hallucinatory behavior.       NEUROLOGICAL EXAM:  Mental status, orientation: Awake, alert and fully oriented.   Speech and language: speech is clear and fluent. The patient is able to name, repeat and comprehend.   Cranial nerve exam: Pupils are 3-4 mm bilaterally and equally reactive to light. Visual fields are intact by confrontation. There is no nystagmus on primary or secondary gaze. Intact full EOM in all directions of gaze. Slight left facial droop appreciated. Sensation in the face is intact to light touch. Uvula is midline. Palate elevates symmetrically. Tongue is midline and without any signs of tongue biting or fasciculations. Shoulder shrug is intact bilaterally.   Motor exam: Strength is 5/5 in all extremities. Tone is normal. No abnormal movements were seen on exam.   Sensory exam Decreased sensation to light touch to LUE/LLE. otherwise normal to light touch/nox stim.   Deep tendon reflexes:  Plantar responses are flexor. There is no clonus.   Coordination: shows a normal finger-nose-finger. Normal rapidly alternating movements.   Gait: Not assessed at this time as patient is a fall risk.      NIH Stroke Scale (at 1204)    1a Level of Consciousness   1b Orientation Questions   1c Response to Commands   2 Gaze   3 Visual Fields   4 Facial Movement 1  5 Motor Function (arm)   a Left   b Right   6 Motor Function (leg)   a Left   b Right   7 Limb Ataxia   8 Sensory 1  9 Language   10 Articulation   11 Extinction/Inattention     Score: 2      ANCILLARY DATA REVIEWED:     Lab Data Review:  No results found for this or any previous visit (from the past 24 hour(s)).    Labs reviewed by me.       Imaging reviewed by me:     CT-HEAD W/O   Final Result      No acute intracranial findings.         DX-CHEST-PORTABLE (1 VIEW)    (Results Pending)       Presumed mechanism by TOAST:  __Large Artery  Atherosclerosis  __Small Vessel (Lacunar)  __Cardioembolic  __Other (Sickle Cell, Vasculitis, Hypercoagulable)  _X_Unknown    Modified Morrill Scale (MRS): 0 = No symptoms      ASSESSMENT AND PLAN:  41-year old female with PMhx significant for ESRD on HD (Tuesday/Thursday/Sat), hypertension, Lupus induced nephritis who presented to [facility] on 9/16/21 for a chief complaint of Left sided weakness/numbness, onset at 1045 today while at dialysis. On scene/on arrival, SBP > 200; BG WNL. Stat CT head with no acute intracranial abnormality. Patient was ultimately determined not to be a candidate for IV tPA given mild/non disabling deficits, patient also reports that she has experienced similar symptoms in the past/while in HD/in setting of hypertensive crisis.   Differential diagnoses include small acute ischemic stroke, TIA or hypertensive induced event. Will obtain MRI Brain wo contrast; if negative, will recommend aggressive BP management; if postiive for stroke will pursue full stroke work up.     Additional Recommendations/Plan:     -Obtain MRI Brain wo contrast-- ordered and pending. If negative, neurology will sign off, and will recommend BP management with goal normotension.   -Give  mg PO now.   -Check lipid panel, Hemoglobin A1c.   -Will follow up with results of the above and make additional recommendations accordingly. All other medical management per primary/ED.        The plan of care above has been discussed with Dr. Halley Alaniz. Please call with questions.     PANCHITO Simmons.  [employer] [facility]

## 2021-09-16 NOTE — H&P
"History & Physical Note    Date of Admission: 9/16/2021  Admission Status: Inpatient  [facility] Team: [facility]  Attending: Eusebio Hastings M.D.   Senior Resident: Dr. Tuttle  Intern: Dr. Lr  Contact Number: [phone]    Chief Complaint: left sided weakness and hypertension    History of Present Illness (HPI):   Mago is a 41 y.o. 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 She has been feeling \"worse and worse\" for the past month, she has noticed increased bruising on her arms and legs without hitting anything. Sudden onset left sided weakness and numbness this morning at her hemodialysis, around 10:30. She was able to move her arm and leg but was much weaker, and noticed numbness in her face and hands. She endorses feeling of weakness in the past with hypertensive crises. She additionally endorses some feeling of chest pressure that has considerably improved, she has nausea off and on over the past month, none currently.   She says in the past she was told she had lupus, and then more recently informed by her provider that she did not have lupus, that her kidney disease was secondary only to her hypertension.  In the emergency department vitals significant for hypertension SBP at 220, hydralazine given, labs signficant for WBC 3.5, troponin 31, imaging significant for EKG sinus rhythm, CT was done that was negative for ischemic stroke, MRI ordered. Patient admitted for stroke rule-out.    Review of Systems: Review of Systems   Constitutional: Positive for malaise/fatigue. Negative for chills and fever.   HENT: Negative for hearing loss, sinus pain and sore throat.         +Headache, left sided frontalis and some temporal region also left sided, mild but persistent off and on for the past month   Eyes: Positive for double vision.   Respiratory: Negative for " cough and shortness of breath.    Cardiovascular: Negative for chest pain and palpitations.        +some chest pressure, not sever   Gastrointestinal: Negative for abdominal pain, heartburn and vomiting.        Nausea in the past, no nausea or vomiting this morning.   Genitourinary:        Oliguric for the past 3 years   Musculoskeletal: Negative for myalgias.   Skin: Negative for rash.   Neurological: Positive for dizziness, tingling, sensory change, focal weakness, weakness and headaches. Negative for seizures and loss of consciousness.   Endo/Heme/Allergies: Bruises/bleeds easily.         Past Medical History:   Past Medical History was reviewed with patient.   has a past medical history of Dialysis patient ([facility]), EKG abnormality, ESRD (end stage renal disease) on dialysis ([facility]) (2014), Hypertension, and Lupus nephritis, ISN/RPS class IV (Formerly [facility]) (2006).    Past Surgical History: Past Surgical History was reviewed with patient.   has a past surgical history that includes other cardiac surgery and primary c section.    Medications: Medications have been reviewed with patient.  Prior to Admission Medications   Prescriptions Last Dose Informant Patient Reported? Taking?   CALCIUM PO 9/15/2021 at Unknown time  Yes Yes   Sig: Take 2 Tablets by mouth 3 times a day with meals.   Multiple Vitamins-Minerals (ONE-A-DAY WOMENS PO) 9/15/2021 at Unknown time Patient Yes No   Sig: Take 1 Tablet by mouth every day.   losartan (COZAAR) 100 MG Tab 9/15/2021 at hs  No No   Sig: Take 1 Tablet by mouth every day.   metoprolol (LOPRESSOR) 50 MG Tab 9/16/2021 at am Patient No No   Sig: TAKE 1 TABLET BY MOUTH TWICE A DAY   Patient taking differently: Take 50 mg by mouth 2 times a day.      Facility-Administered Medications: None        Allergies: Allergies have been reviewed with patient.  Allergies   Allergen Reactions   • Labetalol Palpitations     Fast heart rate, rash, HTN   • Iodine Rash     Rash   • Morphine Itching     Pt states  that she can tolerate small dose of morphine (can tolerate up to 2mg dosage).   • Pork Allergy Itching     Itchy skin       Family History: No known family history of kidney disease or autoimmune disease  family history is not on file.     Social History:   Tobacco: Denies  Alcohol: Denies  Recreational drugs (illegal and prescription):  Denies  Activity Level: Less than a year ago, less and less active  Recent travel:  Denies  Primary Care Provider: reviewed Quinton Christy M.D.  Other (stressors, spirituality, exposures):  Worried about feeling worse and worse.  Physical Exam:   Vitals:  Temp:  [36.1 °C (97 °F)-37.1 °C (98.8 °F)] 37.1 °C (98.8 °F)  Pulse:  [66-83] 77  Resp:  [11-18] 16  BP: (172-222)/() 172/114  SpO2:  [93 %-100 %] 97 %    Physical Exam  Constitutional:       General: She is not in acute distress.     Appearance: She is ill-appearing.   HENT:      Head: Normocephalic and atraumatic.      Nose: No congestion.      Mouth/Throat:      Mouth: Mucous membranes are moist.      Pharynx: No posterior oropharyngeal erythema.   Eyes:      Pupils: Pupils are equal, round, and reactive to light.   Cardiovascular:      Rate and Rhythm: Normal rate and regular rhythm.      Pulses: Normal pulses.      Heart sounds: No murmur heard.   No gallop.    Pulmonary:      Effort: No respiratory distress.      Breath sounds: Normal breath sounds. No wheezing.   Chest:      Chest wall: No tenderness.   Abdominal:      General: Abdomen is flat. There is no distension.      Palpations: Abdomen is soft.      Tenderness: There is no abdominal tenderness. There is no guarding.   Musculoskeletal:         General: No swelling or tenderness.      Cervical back: No rigidity or tenderness.      Right lower leg: No edema.      Left lower leg: No edema.   Skin:     General: Skin is warm and dry.      Coloration: Skin is not jaundiced.      Findings: No erythema.   Neurological:      Mental Status: She is alert.      Motor:  Weakness present.      Comments: Alert and oriented x4.  Patient with left sided facial droop, has loss of sensation compared to right side including all v1-v3, left upper extremity, left lower extremity, and weakness of left upper and lower extremities compared to right side.    Psychiatric:         Thought Content: Thought content normal.         Judgment: Judgment normal.         Labs:   Lab Results   Component Value Date/Time    WBC 3.5 (L) 09/16/2021 12:00 PM    RBC 4.60 09/16/2021 12:00 PM    HEMOGLOBIN 12.9 09/16/2021 12:00 PM    HEMATOCRIT 39.2 09/16/2021 12:00 PM    MCV 85.2 09/16/2021 12:00 PM    MCH 28.0 09/16/2021 12:00 PM    MCHC 32.9 (L) 09/16/2021 12:00 PM    MPV 10.8 09/16/2021 12:00 PM    NEUTSPOLYS 66.40 09/16/2021 12:00 PM    LYMPHOCYTES 21.10 (L) 09/16/2021 12:00 PM    MONOCYTES 7.20 09/16/2021 12:00 PM    EOSINOPHILS 3.80 09/16/2021 12:00 PM    EOSINOPHILS 1 01/30/2016 03:00 PM    BASOPHILS 1.20 09/16/2021 12:00 PM    HYPOCHROMIA 1+ 10/14/2008 09:29 AM    ANISOCYTOSIS 1+ 02/18/2016 09:50 PM      Lab Results   Component Value Date/Time    SODIUM 136 09/16/2021 12:00 PM    POTASSIUM 3.9 09/16/2021 12:00 PM    CHLORIDE 93 (L) 09/16/2021 12:00 PM    CO2 29 09/16/2021 12:00 PM    GLUCOSE 78 09/16/2021 12:00 PM    BUN 15 09/16/2021 12:00 PM    CREATININE 4.25 (H) 09/16/2021 12:00 PM    CREATININE 3.0 (H) 10/14/2008 09:29 AM        EKG: Per my read, normal sinus rhythm    Imaging:   CT Head SEP 16 2021  FINDINGS:  No acute hemorrhage, mass-effect, or midline shift.   No intracranial mass or acute ischemia identified.   Ventricles and cisterns are normal.   Gray/white matter differentiation is maintained.    The paranasal sinuses and mastoid air cells are clear.        IMPRESSION:     No acute intracranial findings.      Previous Data Review: reviewed    Problem Representation: 42 yo female with PMH HTN, ESRD on HD likely 2/2 lupus nephritis presents with hypertensive emergency and signs/symptoms  concerning for CVA vs. TIA.        Hypertensive urgency- (present on admission)  Assessment & Plan  HTN to as high as 220 systolic on this admission. Previous history of urgency admissions.  -No evidence of acute ischemic stroke, will begin aggressive HTN control regimen including starting 5mg po amlodipine and home losartan, with hydralazine as needed.  -Follow up lipid panel, start high intensity statin if indicated, however lipid panel previously within normal limits.    Acute CVA (cerebrovascular accident) (HCC)- (present on admission)  Assessment & Plan  Patient with acute onset left sided weakness, facial droop concerning for possible acute stroke in the setting of hypertensive urgency, but MRI and CT head more suggestive of chronic changes.    -CT head negative for acute intracranial pathology, MRI negative for acute intracranial pathology.  -Aspiration precautions, fall precautions, seizure precautions  -Will start ASA 81  -Start heparin 5000 BID.  -PTOT consult  -Speech therapy consult  -Appreciate further neurology recommendations        Lupus (HCC)  Assessment & Plan  Patient with documented history of lupus workup and diagnosis, but says she was told by her provider that she does not have.    -Repeat antibody testing, DSDNA, GIORGIO, C3-C4, CRP and ESR  - Anuric, so difficult to test urine sediment for urine evidence of lupus-related glomerular disease.  -If studies suggestive of active lupus nephritis, consult nephrology, consider starting glucocorticoids in combination with mycophenolate mofetil or cyclophosphamide.    End stage renal disease (HCC)- (present on admission)  Assessment & Plan  Would recommend continue hemodialysis, but defer to nephrology recommendations.

## 2021-09-16 NOTE — ED TRIAGE NOTES
Pt bib ems from dialysis. Japanese speaking.  Chief Complaint   Patient presents with   • Possible Stroke     Pt was sitting at dialysis, BP was going up then patient started having CP and headache. Pt also c/o left sided arm tingling and left sided facial droop. Pt aox4. BREWER.    Stroke protocol initiated. ERP eval complete. Pt to CT w/ RN then to [facility].

## 2021-09-16 NOTE — THERAPY
"Speech Language Pathology   Clinical Swallow Evaluation     Patient Name: Mago Farfan  AGE:  41 y.o., SEX:  female  Medical Record #: [id number]  Today's Date: 9/16/2021     Precautions: Swallow Precautions ( See Comments)    Assessment    Patient is 41 y.o. female admitted 9/16 for left sided weakness. Patient was ultimately determined not to be a candidate for IV tPA given mild/non disabling deficits, patient also reports that she has experienced similar symptoms in the past/while in HD/in setting of hypertensive crisis.     PMHx significant for ESRD on HD (Tuesday/Thursday/Sat), hypertension, Lupus induced nephritis.     MRI 9/16/21:   1.  No acute intracranial abnormality.  2.  Scattered nonspecific T2 hyperintensities in cerebral white matter again noted.    Patient was seen on this date for a clinical swallow evaluation. Session was performed in [place] by this [facility] certified . Patient awake and AAOx4. Speech intact. Vocal quality reduced in intensity but clear. Patient reported occasional mild globus sensation while pointing to mid esophageal region and confirmed it occurred more with dry solids.     Oral motor examination revealed slight right labial droop at rest and left facial droop during labial retraction task. Patient reported left facial droop is chronic. She reported she had an \"ear infection\" a few years ago which caused \"nerve damage to face.\"     PO trials were administered and consisted of ice chips, thin liquids (tsp/cup/straw), applesauce, mixed solids, and dry solids. Onset of swallow trigger was timely and laryngeal elevation was achieved to palpation. Patient presented with no overt s/sx of aspiration across all trials tested, including consecutive sips of thins. Vocal quality remained clear. Patient reported mild globus sensation x1 following dry solids with liquid wash. Otherwise, denied sensation of food getting stuck with all other trials.       Impression  Patient " is presenting with functional oropharyngeal swallow for regular diet. There are concerns for possible esophageal dysfunction given c/o mild globus sensation. A modified diet is not indicated at this time. No further SLP services warranted. Consider an esophagram with ongoing c/o globus sensation to rule out dysmotility or structural abnormality.     Recommendations  1. Regular/Thin Liquid diet  2. Consider an esophagram with ongoing c/o globus sensation  3. MRI negative for acute CVA, will clarify orders for cognitive linguistic evaluation with MD    Recommend Speech Therapy for Evaluation only for the following treatments:  Dysphagia Training and Patient / Family / Caregiver Education.    Discharge Recommendations: Anticipate that the patient will have no further speech therapy needs after discharge from the hospital     Objective       09/16/21 1648   Vitals   O2 Delivery Device None - Room Air   Prior Level Of Function   Communication Within Functional Limits   Swallow Impaired  (reported occasional globus sensation mid esophageal region)   Dentition Intact   Dentures None   Hearing Within Functional Limits for Evaluation   Hearing Aid None   Vision Within Functional Limits for Evaluation   Patient's Primary Language Sammarinese   Oral Motor Eval    Is Patient Able to Complete Oral Motor Eval Yes but Impaired   Labial Function   Labial Structure At Rest Minimal   Labial Vowel Production / I /, / U / Moderate   Labial Sequence / I /, / U / Moderate   Lingual Function   Lingual Structure At Rest Within Functional Limits   Lingual Protrude Within Functional Limits   Lingual Retract Within Functional Limits   Elevate In Mouth Within Functional Limits   Elevate Outside Mouth Within Functional Limits   Lateralization No Impairment Right;No Impairment Left   Jaw   Jaw Structure At Rest Within Functional Limits   Bite ([place]) Within Functional Limits   Chew ([place]) Within Functional Limits   Velar Function   Velar  Structure At Rest Within Functional Limits   / A / Prolonged Within Functional Limits   Laryngeal Function   Voice Quality Within Functional Limits   Volutional Cough Within Functional Limits   Excursion Upon Swallow Complete   Oral Food Presentation   Ice Chips Within Functional Limits   Single Swallow Thin (0) Within Functional Limits   Serial Swallow Thin (0) Within Functional Limits   Liquidised (3) Within Functional Limits   Soft & Bite-Sized (6) - (Dysphagia III) Within Functional Limits   Regular (7) Within Functional Limits   Self Feeding Independent   Dysphagia Strategies / Recommendations   Compensatory Strategies Monitor During Meals;Head of Bed 90 Degrees During Eating / Drinking   Diet / Liquid Recommendation Regular (7);Thin (0)   Medication Administration  Whole with Liquid Wash   Therapy Interventions No Swallowing Intervention Required

## 2021-09-16 NOTE — ASSESSMENT & PLAN NOTE
Patient with acute onset left sided weakness, facial droop concerning for possible acute stroke in the setting of hypertensive urgency, but MRI and CT head more suggestive of chronic changes.    -CT head negative for acute intracranial pathology, MRI negative for acute intracranial pathology.  -Aspiration precautions, fall precautions, seizure precautions  -Will start ASA 81  -Start heparin 5000 BID.  -PTOT consult  -Speech therapy consult  -Appreciate further neurology recommendations

## 2021-09-16 NOTE — NON-PROVIDER
"    Date of Service: 2021    Internal Medicine Medical Student Admitting History and Physical  Note Author: Aries Hinds    Name Mago Billings     1980   Age/Sex 41 y.o. female   MRN [id number]   Code Status full       Chief Complaint:  L sided weakness, hypertension      HPI:  Ms. 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 Patient states she has been having these symptoms for approximately the past month and reports that she has also been feeling \"intoxicated\" during her dialysis treatments for the past month as well. Additionally, she reports that she has noticed herself bruising without any associated trauma, has had increased swelling in her fingers, feet, and face, and has been experiencing more cramping. Patient also states that she has been becoming hypertensive at recent dialysis treatments and that they have been giving her clonopine for these episodes.   Patient had a sudden onset of L sided weakness/tingling and L facial droop around 10:30 am during her dialysis session today. She reports she was still able to move her L arm and leg, but that they were weaker than normal. She also reports feeling some resolving \"chest pressure\". Pt states she currently has a headache on the top and back of her head, which she has had on and off for the past month as well.    In the ED, vitals showed HTN with SBP at 220s, hydralazine was administered. CT negative, MRI showed no acute changes. WBCs 3.5, troponin 31. EKG sinus rhythm.       ROS  ROS:  Constitutional: (+) malaise/fatigue. Denies chills, fever, night sweats, nausea, vomiting or unplanned weight loss  HEENT: (+) headache on top and back of head. Patient reports this has been on and off for ~1 month. (+) double vision. Denies masses in throat. Denies changes in hearing. Denies " "blurred vision.  Cardio: (+) mild chest \"pressure\". Denies chest pain, palpitations, or edema  Pulm: Denies dyspnea, cough, or wheezing  GI: Denies abd pain, diarrhea or constipation  : (+) oliguric for past 3 years.   Skin: (+) easy bruising. Denies rash or lesions  MSK: Denies pain in neck, back, and extremities  Neuro: (+) L facial droop at all levels, headache, dizziness, tingling, weakness, sensory change.   Psych: Reports good affect and no depression. Denies suicidal or homicidal ideations           Past Medical History (chronic problems, known complications, current management)     Dialysis patient ([facility]), EKG abnormality, ESRD (end stage renal disease) on dialysis ([facility]) (2014), Hypertension, and Lupus nephritis, ISN/RPS class IV ([facility]) (2006).    On losartan and metoprolol at home for HTN.     Past Surgical History:  other cardiac surgery and primary c section.      Medication Allergy/Sensitivities:  Allergies   Allergen Reactions   • Labetalol Palpitations       Fast heart rate, rash, HTN   • Iodine Rash       Rash   • Morphine Itching       Pt states that she can tolerate small dose of morphine (can tolerate up to 2mg dosage).   • Pork Allergy Itching       Itchy skin           Family History:  Not on file    Social History:  Smoking: Denies  Alcohol: Denies  Illictis: Denies  Other: Worries about current health situation  PCP: Quinton Chrsity M.D.        Physical Exam  Constitutional:       General: She is not in acute distress.     Appearance: She is ill-appearing.   HENT:      Head: Normocephalic and atraumatic.      Nose: No congestion.      Mouth/Throat:      Mouth: Mucous membranes are moist.      Pharynx: No posterior oropharyngeal erythema.   Eyes:      Pupils: Pupils are equal, round, and reactive to light.     Vision: Patient reports double vision.   Cardiovascular:      Rate and Rhythm: Normal rate and regular rhythm.      Pulses: Normal pulses.      Heart sounds: No murmur heard.   No " "gallop.    Pulmonary:      Effort: No respiratory distress.      Breath sounds: Normal breath sounds. No wheezing.   Chest:      Chest wall: No tenderness.   Abdominal:      General: Abdomen is flat. There is no distension.      Palpations: Abdomen is soft.      Tenderness: There is no abdominal tenderness. There is no guarding.   Musculoskeletal:         General: No swelling or tenderness.      Cervical back: No rigidity or tenderness.      Right lower leg: No edema.      Left lower leg: No edema.   Skin:     General: Skin is warm and dry.      Coloration: Skin is not jaundiced.      Findings: No erythema.   Neurological:      Mental Status: She is alert.      Motor: Weakness present.      Comments: Alert and oriented x4.  Patient reports decreased sensation on entire L side, including V1-V3 and upper and lower extremities. Strength 5/5 BUE, 5/5 RLE, 4/5 LLE. CN III-XII intact besides decreased sensation L side V1-V3, and L facial droop including inability to puff out L cheek, inability to raise L side eyebrows, and decreased lift on L side with smile. Complete L-sided facial decreased mobility noted.   Psychiatric:         Thought Content: Thought content normal.         Judgment: Judgment normal.      Vitals:    09/16/21 1331 09/16/21 1401 09/16/21 1531 09/16/21 1611   BP: (!) 211/111 (!) 177/102 (!) 186/120 (!) 172/114   Pulse: 74 75 83 77   Resp: 16 16 16 16   Temp:    37.1 °C (98.8 °F)   TempSrc:    Temporal   SpO2: 96% 97% 98% 97%   Weight:       Height:         Body mass index is 20 kg/m².  BP (!) 172/114   Pulse 77   Temp 37.1 °C (98.8 °F) (Temporal)   Resp 16   Ht 1.676 m (5' 6\")   Wt 56.2 kg (123 lb 14.4 oz)   SpO2 97%   BMI 20.00 kg/m²   O2 therapy: Pulse Oximetry: 97 %, O2 (LPM): 0, O2 Delivery Device: None - Room Air    Physical Exam    Labs/Imaging:  Recent/pertinent lab results & imaging reviewed.  Recent Results (from the past 24 hour(s))   CBC WITH DIFFERENTIAL    Collection Time: 09/16/21 " 12:00 PM   Result Value Ref Range    WBC 3.5 (L) 4.8 - 10.8 K/uL    RBC 4.60 4.20 - 5.40 M/uL    Hemoglobin 12.9 12.0 - 16.0 g/dL    Hematocrit 39.2 37.0 - 47.0 %    MCV 85.2 81.4 - 97.8 fL    MCH 28.0 27.0 - 33.0 pg    MCHC 32.9 (L) 33.6 - 35.0 g/dL    RDW 39.6 35.9 - 50.0 fL    Platelet Count 128 (L) 164 - 446 K/uL    MPV 10.8 9.0 - 12.9 fL    Neutrophils-Polys 66.40 44.00 - 72.00 %    Lymphocytes 21.10 (L) 22.00 - 41.00 %    Monocytes 7.20 0.00 - 13.40 %    Eosinophils 3.80 0.00 - 6.90 %    Basophils 1.20 0.00 - 1.80 %    Immature Granulocytes 0.30 0.00 - 0.90 %    Nucleated RBC 0.00 /100 WBC    Neutrophils (Absolute) 2.30 2.00 - 7.15 K/uL    Lymphs (Absolute) 0.73 (L) 1.00 - 4.80 K/uL    Monos (Absolute) 0.25 0.00 - 0.85 K/uL    Eos (Absolute) 0.13 0.00 - 0.51 K/uL    Baso (Absolute) 0.04 0.00 - 0.12 K/uL    Immature Granulocytes (abs) 0.01 0.00 - 0.11 K/uL    NRBC (Absolute) 0.00 K/uL   COMP METABOLIC PANEL    Collection Time: 09/16/21 12:00 PM   Result Value Ref Range    Sodium 136 135 - 145 mmol/L    Potassium 3.9 3.6 - 5.5 mmol/L    Chloride 93 (L) 96 - 112 mmol/L    Co2 29 20 - 33 mmol/L    Anion Gap 14.0 7.0 - 16.0    Glucose 78 65 - 99 mg/dL    Bun 15 8 - 22 mg/dL    Creatinine 4.25 (H) 0.50 - 1.40 mg/dL    Calcium 9.9 8.5 - 10.5 mg/dL    AST(SGOT) 28 12 - 45 U/L    ALT(SGPT) 47 2 - 50 U/L    Alkaline Phosphatase 81 30 - 99 U/L    Total Bilirubin 0.5 0.1 - 1.5 mg/dL    Albumin 4.7 3.2 - 4.9 g/dL    Total Protein 7.8 6.0 - 8.2 g/dL    Globulin 3.1 1.9 - 3.5 g/dL    A-G Ratio 1.5 g/dL   PROTHROMBIN TIME    Collection Time: 09/16/21 12:00 PM   Result Value Ref Range    PT 13.4 12.0 - 14.6 sec    INR 1.05 0.87 - 1.13   APTT    Collection Time: 09/16/21 12:00 PM   Result Value Ref Range    APTT 32.7 24.7 - 36.0 sec   COD (ADULT)    Collection Time: 09/16/21 12:00 PM   Result Value Ref Range    ABO Grouping Only O     Rh Grouping Only POS     Antibody Screen-Cod NEG    TROPONIN    Collection Time: 09/16/21  12:00 PM   Result Value Ref Range    Troponin T 34 (H) 6 - 19 ng/L   ESTIMATED GFR    Collection Time: 21 12:00 PM   Result Value Ref Range    GFR If  14 (A) >60 mL/min/1.73 m 2    GFR If Non  12 (A) >60 mL/min/1.73 m 2   COMPLEMENT C4    Collection Time: 21 12:00 PM   Result Value Ref Range    Complement C4 18.1 (L) 19.0 - 52.0 mg/dL   COMPLEMENT C3    Collection Time: 21 12:00 PM   Result Value Ref Range    C3 Complement 71.6 (L) 87.0 - 200.0 mg/dL   CRP QUANTITIVE (NON-CARDIAC)    Collection Time: 21 12:00 PM   Result Value Ref Range    Stat C-Reactive Protein <0.30 0.00 - 0.75 mg/dL   MAGNESIUM    Collection Time: 21 12:00 PM   Result Value Ref Range    Magnesium 2.3 1.5 - 2.5 mg/dL   EKG (NOW)    Collection Time: 21 12:09 PM   Result Value Ref Range    Report       [facility] Emergency Dept.    Test Date:  2021  Pt Name:    MARGARITA DEWEY                 Department: ER  MRN:        [id number]                      Room:       [facility]  Gender:     Female                       Technician: [id number]  :        1980                   Requested By:SHARA TRAN  Order #:    [id number]                    Reading MD: SHARA TRAN MD    Measurements  Intervals                                Axis  Rate:       80                           P:          57  MD:         156                          QRS:        43  QRSD:       80                           T:          169  QT:         400  QTc:        462    Interpretive Statements  SINUS RHYTHM  PROBABLE LEFT ATRIAL ABNORMALITY  LVH WITH SECONDARY REPOLARIZATION ABNORMALITY  Compared to ECG 2021 12:27:13  Left ventricular hypertrophy now present  Early repolarization now present  Sinus bradycardia no longer present  lateral t wave inversions in V5 V6  Electronically Signed On  2021 12:22:54 PDT by SHARA TRAN MD         MR-BRAIN-W/O   Final Result      1.  No  acute intracranial abnormality.   2.  Scattered nonspecific T2 hyperintensities in cerebral white matter again noted.      DX-CHEST-PORTABLE (1 VIEW)   Final Result      No evidence of acute cardiopulmonary process.      CT-HEAD W/O   Final Result      No acute intracranial findings.         EC-ECHOCARDIOGRAM COMPLETE W/O CONT    (Results Pending)         Assessment/Plan   Pt is a 41 y.o. female with PMH of HTN, lupus diagnosis, and ESRD likely secondary to lupus nephritis on HD >7 years presenting with hypertensive emergency and signs/symptoms concerning for CVA.     Problem representation:    Hypertensive emergency   HTN up to 220 on admision. Patient has a history of HTN and previous urgency admissions. Patient reporting consistent headache and L sided weakness. She also has double vision. No evidence of acute ischemic stroke seen.    -aggressive HTN control initiation    -amlodipine 5 mg    -home losartan    -hydralazine PRN   -follow up lipid panel    -begin high intensity statin if indicated    Acute CVA   Patient presents with L sided weakness, including entire L side facial droop. Concerning for possible acute stroke in setting of high BP. CT and MRI revealing exclusively chronic changes.  given.   -neurology consult, appreciate assistance    -recommend BP management with goal normotension    -check lipid panel and A1c    -neuro will follow up with above results and make additional recommendations as needed   -start ASA 81   -begin heparin 5000 BID   -PT/OT consult   -speech therapy consult    Lupus   Patient has a history of lupus diagnosis. Previous notes state lupus led to patient's ESRD, however patient states she has heard a provider tell her that her ESRD is not caused by lupus.    -repeat antibody testing, dsDNA, GIORGIO, C3-C4, CRP and ESR   -Patient is anuric, difficult to test urine sediment for evidence of lupus-related glomerular disease.    -consider nephrology consult   -consider starting  glucocorticoids with mycophenolate mofetil or cyclophosphamide    End-stage renal disease   Patient has a history of ESRD likely secondary to lupus nephritis. Patient has been on dialysis >7 years, oliguric 3 years.    -consult nephrology

## 2021-09-16 NOTE — ASSESSMENT & PLAN NOTE
HTN to as high as 220 systolic on this admission. Previous history of urgency admissions.  -No evidence of acute ischemic stroke, will begin aggressive HTN control regimen including starting 5mg po amlodipine and home losartan, with hydralazine as needed.  -Follow up lipid panel, start high intensity statin if indicated, however lipid panel previously within normal limits.

## 2021-09-16 NOTE — ED NOTES
Report given to Daja PEREIRA. Pt being transported to [facility] in stable condition w/ acls RN and cosmo. All belongings and chart w/ pt

## 2021-09-16 NOTE — ED PROVIDER NOTES
ED Provider Note    CHIEF COMPLAINT  Chief Complaint   Patient presents with   • Possible Stroke     Patient arrives via EMS  Patient met at the charge desk as a stroke alert    HPI  Mago Farfan is a 41 y.o. female who presents complaining of strokelike symptoms.    Patient reports that she has had issues with elevated blood pressures for the last month.  Every time she goes to dialysis they give her something to control this.  This morning at dialysis she experienced shortness of breath and chest pressure with elevated blood pressure.  She was given clonidine there.  At 1045, she experienced headache with left-sided weakness.  Her pressure was noted to be in the 200s over 100s.  She was transported to the ER for further evaluation.    Patient describes her chest pain as a pressure/tightening.    Patient denies nausea, vomiting, visual changes, neck pain, fever, chills.    Patient reports allergy to contrast dye.     ALLERGIES  Allergies   Allergen Reactions   • Labetalol Palpitations     Fast heart rate, rash, HTN   • Iodine Rash     Rash   • Morphine Itching     Pt states that she can tolerate small dose of morphine (can tolerate up to 2mg dosage).   • Pork Allergy Itching     Itchy skin       CURRENT MEDICATIONS  Home Medications     Reviewed by Dejuan Cartwright (Pharmacy Tech) on 09/16/21 at 1341  Med List Status: Complete   Medication Last Dose Status   CALCIUM PO 9/15/2021 Active   losartan (COZAAR) 100 MG Tab 9/15/2021 Active   metoprolol (LOPRESSOR) 50 MG Tab 9/16/2021 Active   Multiple Vitamins-Minerals (ONE-A-DAY WOMENS PO) 9/15/2021 Active                PAST MEDICAL HISTORY   has a past medical history of Dialysis patient ([facility]), EKG abnormality, ESRD (end stage renal disease) on dialysis ([facility]) (2014), Hypertension, and Lupus nephritis, ISN/RPS class IV ([facility]) (2006).    SURGICAL HISTORY   has a past surgical history that includes other cardiac surgery and primary c section.    SOCIAL  "HISTORY  Social History     Tobacco Use   • Smoking status: Never Smoker   • Smokeless tobacco: Never Used   Vaping Use   • Vaping Use: Never used   Substance and Sexual Activity   • Alcohol use: No   • Drug use: No   • Sexual activity: Not on file       Family Hx:  Hypertension      REVIEW OF SYSTEMS  See HPI for further details.  All other systems are negative except as above in HPI.    PHYSICAL EXAM  VITAL SIGNS: BP (!) 185/98   Pulse 66   Temp 36.1 °C (97 °F) (Temporal)   Resp 16   Ht 1.676 m (5' 6\")   Wt 56.2 kg (123 lb 14.4 oz)   SpO2 93%   BMI 20.00 kg/m²     General:  WDWN  female, fatigued and generally weak appearing in NAD; A+Ox3; elevated blood pressure, 200s over 100s at the charge desk  Skin: warm and dry; good color; no rash  HEENT: NCAT; EOMs intact; PERRL; no scleral icterus; left facial droop  Neck: FROM; soft, no JVD  Cardiovascular: Regular heart rate and rhythm.  No murmurs, rubs, or gallops; pulses 2+ bilaterally radially and DP areas  Lungs: Clear to auscultation with good air movement bilaterally.  No wheezes, rhonchi, or rales.   Abdomen: BS present; soft; NTND; no rebound, guarding, or rigidity.  No organomegaly or pulsatile mass  Extremities: no e/o trauma; no pedal edema; neg Mattie's  Neurologic: CNs III-XII formally intact except for left facial droop; speech clear; distal sensation intact with the exception of subjective numbness in the left upper extremity; strength 4/5 UE/LE on the left, 5 out of 5 on the right; no drift  Psychiatric: Appropriate affect, normal mood    LABS  Results for orders placed or performed during the hospital encounter of 09/16/21   CBC WITH DIFFERENTIAL   Result Value Ref Range    WBC 3.5 (L) 4.8 - 10.8 K/uL    RBC 4.60 4.20 - 5.40 M/uL    Hemoglobin 12.9 12.0 - 16.0 g/dL    Hematocrit 39.2 37.0 - 47.0 %    MCV 85.2 81.4 - 97.8 fL    MCH 28.0 27.0 - 33.0 pg    MCHC 32.9 (L) 33.6 - 35.0 g/dL    RDW 39.6 35.9 - 50.0 fL    Platelet Count 128 (L) " 164 - 446 K/uL    MPV 10.8 9.0 - 12.9 fL    Neutrophils-Polys 66.40 44.00 - 72.00 %    Lymphocytes 21.10 (L) 22.00 - 41.00 %    Monocytes 7.20 0.00 - 13.40 %    Eosinophils 3.80 0.00 - 6.90 %    Basophils 1.20 0.00 - 1.80 %    Immature Granulocytes 0.30 0.00 - 0.90 %    Nucleated RBC 0.00 /100 WBC    Neutrophils (Absolute) 2.30 2.00 - 7.15 K/uL    Lymphs (Absolute) 0.73 (L) 1.00 - 4.80 K/uL    Monos (Absolute) 0.25 0.00 - 0.85 K/uL    Eos (Absolute) 0.13 0.00 - 0.51 K/uL    Baso (Absolute) 0.04 0.00 - 0.12 K/uL    Immature Granulocytes (abs) 0.01 0.00 - 0.11 K/uL    NRBC (Absolute) 0.00 K/uL   COMP METABOLIC PANEL   Result Value Ref Range    Sodium 136 135 - 145 mmol/L    Potassium 3.9 3.6 - 5.5 mmol/L    Chloride 93 (L) 96 - 112 mmol/L    Co2 29 20 - 33 mmol/L    Anion Gap 14.0 7.0 - 16.0    Glucose 78 65 - 99 mg/dL    Bun 15 8 - 22 mg/dL    Creatinine 4.25 (H) 0.50 - 1.40 mg/dL    Calcium 9.9 8.5 - 10.5 mg/dL    AST(SGOT) 28 12 - 45 U/L    ALT(SGPT) 47 2 - 50 U/L    Alkaline Phosphatase 81 30 - 99 U/L    Total Bilirubin 0.5 0.1 - 1.5 mg/dL    Albumin 4.7 3.2 - 4.9 g/dL    Total Protein 7.8 6.0 - 8.2 g/dL    Globulin 3.1 1.9 - 3.5 g/dL    A-G Ratio 1.5 g/dL   PROTHROMBIN TIME   Result Value Ref Range    PT 13.4 12.0 - 14.6 sec    INR 1.05 0.87 - 1.13   APTT   Result Value Ref Range    APTT 32.7 24.7 - 36.0 sec   COD (ADULT)   Result Value Ref Range    ABO Grouping Only O     Rh Grouping Only POS     Antibody Screen-Cod NEG    TROPONIN   Result Value Ref Range    Troponin T 34 (H) 6 - 19 ng/L   ESTIMATED GFR   Result Value Ref Range    GFR If  14 (A) >60 mL/min/1.73 m 2    GFR If Non  12 (A) >60 mL/min/1.73 m 2   EKG (NOW)   Result Value Ref Range    Report       [facility] Emergency Dept.    Test Date:  2021-09-16  Pt Name:    MARGARITA DEWEY                 Department: ER  MRN:        [id number]                      Room:        14  Gender:     Female                        Technician: [id number]  :        1980                   Requested By:SHARA TRAN  Order #:    [id number]                    Reading MD: SHARA TRAN MD    Measurements  Intervals                                Axis  Rate:       80                           P:          57  KY:         156                          QRS:        43  QRSD:       80                           T:          169  QT:         400  QTc:        462    Interpretive Statements  SINUS RHYTHM  PROBABLE LEFT ATRIAL ABNORMALITY  LVH WITH SECONDARY REPOLARIZATION ABNORMALITY  Compared to ECG 2021 12:27:13  Left ventricular hypertrophy now present  Early repolarization now present  Sinus bradycardia no longer present  lateral t wave inversions in V5 V6  Electronically Signed On  2021 12:22:54 PDT by SHARA TRAN MD           IMAGING  DX-CHEST-PORTABLE (1 VIEW)   Final Result      No evidence of acute cardiopulmonary process.      CT-HEAD W/O   Final Result      No acute intracranial findings.         MR-BRAIN-W/O    (Results Pending)     MEDICAL RECORD  I have reviewed patient's medical record and pertinent results are listed below.      COURSE & MEDICAL DECISION MAKING  I have reviewed any medical record information, laboratory studies and radiographic results as noted.    Mago Farfan is a 41 y.o. female who presents complaining of left-sided strokelike symptoms with chest pressure and headache.  Patient is hypertensive at the charge desk upon initial evaluation.    Appropriate PPE was worn at all times while interacting with the patient.    Neurology/stroke team present to the charge desk for initial evaluation.  CT head without contrast ordered given patient's contrast allergy) obtained.    NIHSS 5    CT head negative for acute pathology.    EKG demonstrates no acute ST changes.  There are some T wave inversions.    1:09 PM  Pt re-evaulated.  BP 180s.  Patient advised of plan to admit and negative CT  results.    1:47 PM  Discussed with [facility] resident who agrees to admit patient.    2:48 PM  Dr. Najjar from nephrology is aware that the patient is being admitted.  He will place orders for dialysis.    The total critical care time on this patient is 40 minutes, resuscitating patient, speaking with admitting physician, and deciphering test results. This 40 minutes is exclusive of separately billable procedures.      FINAL IMPRESSION  1. Chest pain, unspecified type     2. Weakness     3. Hypertensive urgency       Electronically signed by: Laura Yu M.D., 9/16/2021 12:07 PM

## 2021-09-16 NOTE — PROGRESS NOTES
Patient arrived to T730, orientated to room. No skin issues noted. Left arm precautions. Dialysis fistula. Patient still complaining of headache 6/10. Really wants to drink water, but keeping NPO awaiting speech therapy eval. Called speech therapy, they will be able to evaluate her tonight. Assessed patient with Maori language line . Left sided facial droop noted with increased numbness to left side of face. MRI done in ER. Allowing permissive HTN at this time per order.

## 2021-09-16 NOTE — ASSESSMENT & PLAN NOTE
Patient with documented history of lupus workup and diagnosis, but says she was told by her provider that she does not have.    -Repeat antibody testing, DSDNA, GIORGIO, C3-C4, CRP and ESR  - Anuric, so difficult to test urine sediment for urine evidence of lupus-related glomerular disease.  -If studies suggestive of active lupus nephritis, consult nephrology, consider starting glucocorticoids in combination with mycophenolate mofetil or cyclophosphamide.

## 2021-09-17 VITALS
BODY MASS INDEX: 21.36 KG/M2 | OXYGEN SATURATION: 93 % | TEMPERATURE: 98.6 F | SYSTOLIC BLOOD PRESSURE: 169 MMHG | HEART RATE: 104 BPM | RESPIRATION RATE: 18 BRPM | DIASTOLIC BLOOD PRESSURE: 121 MMHG | HEIGHT: 66 IN | WEIGHT: 132.94 LBS

## 2021-09-17 LAB
ALBUMIN SERPL BCP-MCNC: 4.2 G/DL (ref 3.2–4.9)
ALBUMIN/GLOB SERPL: 1.6 G/DL
ALP SERPL-CCNC: 72 U/L (ref 30–99)
ALT SERPL-CCNC: 43 U/L (ref 2–50)
ANION GAP SERPL CALC-SCNC: 11 MMOL/L (ref 7–16)
AST SERPL-CCNC: 29 U/L (ref 12–45)
BASOPHILS # BLD AUTO: 0.6 % (ref 0–1.8)
BASOPHILS # BLD: 0.03 K/UL (ref 0–0.12)
BILIRUB SERPL-MCNC: 0.4 MG/DL (ref 0.1–1.5)
BUN SERPL-MCNC: 24 MG/DL (ref 8–22)
CALCIUM SERPL-MCNC: 9.4 MG/DL (ref 8.5–10.5)
CHLORIDE SERPL-SCNC: 92 MMOL/L (ref 96–112)
CHOLEST SERPL-MCNC: 155 MG/DL (ref 100–199)
CO2 SERPL-SCNC: 29 MMOL/L (ref 20–33)
CREAT SERPL-MCNC: 6.77 MG/DL (ref 0.5–1.4)
EOSINOPHIL # BLD AUTO: 0.05 K/UL (ref 0–0.51)
EOSINOPHIL NFR BLD: 1 % (ref 0–6.9)
ERYTHROCYTE [DISTWIDTH] IN BLOOD BY AUTOMATED COUNT: 39.9 FL (ref 35.9–50)
GLOBULIN SER CALC-MCNC: 2.7 G/DL (ref 1.9–3.5)
GLUCOSE SERPL-MCNC: 90 MG/DL (ref 65–99)
HCT VFR BLD AUTO: 37.3 % (ref 37–47)
HDLC SERPL-MCNC: 65 MG/DL
HGB BLD-MCNC: 12.2 G/DL (ref 12–16)
IMM GRANULOCYTES # BLD AUTO: 0.01 K/UL (ref 0–0.11)
IMM GRANULOCYTES NFR BLD AUTO: 0.2 % (ref 0–0.9)
LDLC SERPL CALC-MCNC: 79 MG/DL
LYMPHOCYTES # BLD AUTO: 0.54 K/UL (ref 1–4.8)
LYMPHOCYTES NFR BLD: 11.1 % (ref 22–41)
MCH RBC QN AUTO: 27.9 PG (ref 27–33)
MCHC RBC AUTO-ENTMCNC: 32.7 G/DL (ref 33.6–35)
MCV RBC AUTO: 85.4 FL (ref 81.4–97.8)
MONOCYTES # BLD AUTO: 0.45 K/UL (ref 0–0.85)
MONOCYTES NFR BLD AUTO: 9.2 % (ref 0–13.4)
NEUTROPHILS # BLD AUTO: 3.79 K/UL (ref 2–7.15)
NEUTROPHILS NFR BLD: 77.9 % (ref 44–72)
NRBC # BLD AUTO: 0 K/UL
NRBC BLD-RTO: 0 /100 WBC
PLATELET # BLD AUTO: 142 K/UL (ref 164–446)
PMV BLD AUTO: 11 FL (ref 9–12.9)
POTASSIUM SERPL-SCNC: 5.2 MMOL/L (ref 3.6–5.5)
PROT SERPL-MCNC: 6.9 G/DL (ref 6–8.2)
RBC # BLD AUTO: 4.37 M/UL (ref 4.2–5.4)
SODIUM SERPL-SCNC: 132 MMOL/L (ref 135–145)
TRIGL SERPL-MCNC: 54 MG/DL (ref 0–149)
WBC # BLD AUTO: 4.9 K/UL (ref 4.8–10.8)

## 2021-09-17 PROCEDURE — A9270 NON-COVERED ITEM OR SERVICE: HCPCS | Performed by: STUDENT IN AN ORGANIZED HEALTH CARE EDUCATION/TRAINING PROGRAM

## 2021-09-17 PROCEDURE — 36415 COLL VENOUS BLD VENIPUNCTURE: CPT

## 2021-09-17 PROCEDURE — 700111 HCHG RX REV CODE 636 W/ 250 OVERRIDE (IP): Performed by: STUDENT IN AN ORGANIZED HEALTH CARE EDUCATION/TRAINING PROGRAM

## 2021-09-17 PROCEDURE — 80053 COMPREHEN METABOLIC PANEL: CPT

## 2021-09-17 PROCEDURE — 700102 HCHG RX REV CODE 250 W/ 637 OVERRIDE(OP): Performed by: STUDENT IN AN ORGANIZED HEALTH CARE EDUCATION/TRAINING PROGRAM

## 2021-09-17 PROCEDURE — 85025 COMPLETE CBC W/AUTO DIFF WBC: CPT

## 2021-09-17 PROCEDURE — 80061 LIPID PANEL: CPT

## 2021-09-17 PROCEDURE — 99239 HOSP IP/OBS DSCHRG MGMT >30: CPT | Mod: GC | Performed by: INTERNAL MEDICINE

## 2021-09-17 RX ORDER — ONDANSETRON 2 MG/ML
4 INJECTION INTRAMUSCULAR; INTRAVENOUS ONCE
Status: DISCONTINUED | OUTPATIENT
Start: 2021-09-17 | End: 2021-09-17 | Stop reason: HOSPADM

## 2021-09-17 RX ORDER — ASPIRIN 81 MG/1
81 TABLET ORAL DAILY
Qty: 30 TABLET | Refills: 0 | Status: SHIPPED | OUTPATIENT
Start: 2021-09-18 | End: 2021-12-15

## 2021-09-17 RX ORDER — LABETALOL HYDROCHLORIDE 5 MG/ML
10 INJECTION, SOLUTION INTRAVENOUS ONCE
Status: DISCONTINUED | OUTPATIENT
Start: 2021-09-17 | End: 2021-09-17 | Stop reason: HOSPADM

## 2021-09-17 RX ORDER — ONDANSETRON 4 MG/1
4 TABLET, FILM COATED ORAL EVERY 6 HOURS PRN
Qty: 15 TABLET | Refills: 0 | Status: SHIPPED | OUTPATIENT
Start: 2021-09-17 | End: 2021-09-21

## 2021-09-17 RX ORDER — AMLODIPINE BESYLATE 5 MG/1
5 TABLET ORAL DAILY
Qty: 30 TABLET | Refills: 0 | Status: SHIPPED | OUTPATIENT
Start: 2021-09-18 | End: 2021-12-15

## 2021-09-17 RX ORDER — CARVEDILOL 12.5 MG/1
12.5 TABLET ORAL 2 TIMES DAILY WITH MEALS
Qty: 60 TABLET | Refills: 0 | Status: SHIPPED | OUTPATIENT
Start: 2021-09-17 | End: 2022-03-01

## 2021-09-17 RX ADMIN — ONDANSETRON 4 MG: 2 INJECTION INTRAMUSCULAR; INTRAVENOUS at 07:41

## 2021-09-17 RX ADMIN — DOCUSATE SODIUM 50 MG AND SENNOSIDES 8.6 MG 2 TABLET: 8.6; 5 TABLET, FILM COATED ORAL at 05:26

## 2021-09-17 RX ADMIN — AMLODIPINE BESYLATE 5 MG: 5 TABLET ORAL at 05:27

## 2021-09-17 RX ADMIN — ASPIRIN 81 MG: 81 TABLET, COATED ORAL at 05:27

## 2021-09-17 RX ADMIN — HEPARIN SODIUM 5000 UNITS: 5000 INJECTION, SOLUTION INTRAVENOUS; SUBCUTANEOUS at 05:28

## 2021-09-17 RX ADMIN — HYDRALAZINE HYDROCHLORIDE 10 MG: 20 INJECTION INTRAMUSCULAR; INTRAVENOUS at 07:49

## 2021-09-17 ASSESSMENT — PAIN DESCRIPTION - PAIN TYPE
TYPE: ACUTE PAIN
TYPE: ACUTE PAIN

## 2021-09-17 NOTE — THERAPY
09/17/21 1031   Interdisciplinary Plan of Care Collaboration   Collaboration Comments OT referral received. RN request hold eval at this time due to N+V, hypertension. Will attempt again.

## 2021-09-17 NOTE — DISCHARGE INSTRUCTIONS
Please follow-up with PCP in next week. Please check blood presssure three times a day until next visit with PCP so he can adjust your blood pressure regimen appropriately.

## 2021-09-17 NOTE — DISCHARGE SUMMARY
Discharge Summary    Date of Admission: 9/16/2021  Date of Discharge: No discharge date for patient encounter.  Discharging Attending: Euesbio Hastings M.D.   Discharging Senior Resident: Dr. Tuttle  Discharging Intern: Dr. Mckinley    CHIEF COMPLAINT ON ADMISSION  Chief Complaint   Patient presents with   • Possible Stroke       Reason for Admission  Hypertensive urgency    Admission Date  9/16/2021    CODE STATUS  Full Code    HPI & HOSPITAL COURSE  Miss Billings is a 41 YOF with PMHx HTN, ESRD (TTS HD, likely 2/2 lupus nephritis) who came in on 9/16/21 with fatigue, chest discomfort, HA, L-sided weakness, and severely elevated BP, concerning for acute stroke 2/2 hypertensive emergency vs urgency. However, CT and MRI head were normal, suggestive of hypertensive urgency.    #Hypertensive urgency  #TIA vs stroke  #Nausea/vomiting    SBP as high as 220 in the ED with history of prior admissions for similar complaints. Per pt, her blood pressure has been running high for the past month in the dialysis center and has been receiving Clonidine as needed. However, with this episode, she had associated weakness, facial droop, headache, and chest discomfort. She was given IV Hydralazine in ED and continued on the floor. Neurology was consulted - CT head, MRI head both showed no abnormalities. Neurology signed off. Echo with bubble study was also normal. Amlodipine was added for blood pressure control. Her blood pressure on day of discharge continued to remain elevated with SBP in 160s. She also complained of nausea and vomiting with inability to tolerate PO. Discussed starting IV Labetalol; however, pt decided to leave AMA with most recent BP of 169/121. She was switched from Metoprolol to Carvedilol and will go home with Amlodipine, Aspirin, and short supply of Zofran.     #ESRD  #Lupus nephritis     Came from dialysis with symptoms. Normally follows with Dr. Merritt. Pathology from 2006 confirms lupus nephritis. Pt reports that she  was told she no longer has this though. C3 and C4 were low, ESR and CRP normal, GIORGIO pending. Recommend follow-up with Nephrology.     Therefore, she is discharged in good and stable condition against medcial advice.    The patient recovered much more quickly than anticipated on admission.     Review of Systems   Constitutional: Positive for malaise/fatigue. Negative for chills and fever.   HENT: Negative for hearing loss, sinus pain and sore throat.         +Headache, left sided frontalis and some temporal region also left sided, mild but persistent off and on for the past month   Eyes: Positive for double vision.   Respiratory: Negative for cough and shortness of breath.    Cardiovascular: Negative for chest pain and palpitations.        +some chest pressure, not sever   Gastrointestinal: Negative for abdominal pain, heartburn and vomiting.        Nausea in the past, no nausea or vomiting this morning.   Genitourinary:        Oliguric for the past 3 years   Musculoskeletal: Negative for myalgias.   Skin: Negative for rash.   Neurological: Positive for dizziness, tingling, sensory change, focal weakness, weakness and headaches. Negative for seizures and loss of consciousness.   Endo/Heme/Allergies: Bruises/bleeds easily.     PHYSICAL EXAM ON DISCHARGE  Temp:  [36 °C (96.8 °F)-37.1 °C (98.8 °F)] 37 °C (98.6 °F)  Pulse:  [] 104  Resp:  [11-18] 18  BP: (133-222)/() 169/121  SpO2:  [93 %-100 %] 93 %    Physical Exam  Constitutional:       General: She is not in acute distress.     Appearance: She is ill-appearing.   HENT:      Head: Normocephalic and atraumatic.      Nose: No congestion.      Mouth/Throat:      Mouth: Mucous membranes are moist.      Pharynx: No posterior oropharyngeal erythema.   Eyes:      Pupils: Pupils are equal, round, and reactive to light.   Cardiovascular:      Rate and Rhythm: Normal rate and regular rhythm.      Pulses: Normal pulses.      Heart sounds: No murmur heard.   No  gallop.    Pulmonary:      Effort: No respiratory distress.      Breath sounds: Normal breath sounds. No wheezing.   Chest:      Chest wall: No tenderness.   Abdominal:      General: Abdomen is flat. There is no distension.      Palpations: Abdomen is soft.      Tenderness: There is no abdominal tenderness. There is no guarding.   Musculoskeletal:         General: No swelling or tenderness.      Cervical back: No rigidity or tenderness.      Right lower leg: No edema.      Left lower leg: No edema.   Skin:     General: Skin is warm and dry.      Coloration: Skin is not jaundiced.      Findings: No erythema.   Neurological:      Mental Status: She is alert and oriented to person, place, and time. Mental status is at baseline.      Cranial Nerves: Cranial nerve deficit (L-sided weakness with CN V and VII) present.      Sensory: Sensory deficit (decreased sensation (mild) on L side) present.      Motor: No weakness (resolved).      Comments: Alert and oriented x4, mild L-sided facial droop   Psychiatric:         Thought Content: Thought content normal.         Judgment: Judgment normal.     Discharge Date  9/17/21    FOLLOW UP ITEMS POST DISCHARGE  Follow-up with PCP and Nephrology    DISCHARGE DIAGNOSES  Active Problems:    End stage renal disease (HCC) POA: Yes    Acute CVA (cerebrovascular accident) (HCC) POA: Yes    Hypertensive urgency POA: Yes    Lupus (HCC) POA: Unknown  Resolved Problems:    * No resolved hospital problems. *      FOLLOW UP  No future appointments.  No follow-up provider specified.    MEDICATIONS ON DISCHARGE     Medication List      START taking these medications      Instructions   amLODIPine 5 MG Tabs  Start taking on: September 18, 2021  Commonly known as: NORVASC   Take 1 Tablet by mouth every day.  Dose: 5 mg     aspirin 81 MG EC tablet  Start taking on: September 18, 2021   Take 1 Tablet by mouth every day.  Dose: 81 mg     carvedilol 12.5 MG Tabs  Commonly known as: COREG   Take 1  Tablet by mouth 2 times a day with meals.  Dose: 12.5 mg     ondansetron 4 MG Tabs tablet  Commonly known as: Zofran   Take 1 Tablet by mouth every 6 hours as needed for Nausea/Vomiting for up to 4 days.  Dose: 4 mg        CONTINUE taking these medications      Instructions   CALCIUM PO   Take 2 Tablets by mouth 3 times a day with meals.  Dose: 2 Tablet     losartan 100 MG Tabs  Commonly known as: COZAAR   Take 1 Tablet by mouth every day.  Dose: 100 mg     ONE-A-DAY WOMENS PO   Take 1 Tablet by mouth every day.  Dose: 1 Tablet        STOP taking these medications    metoprolol tartrate 50 MG Tabs  Commonly known as: LOPRESSOR            Allergies  Allergies   Allergen Reactions   • Labetalol Palpitations     Fast heart rate, rash, HTN   • Iodine Rash     Rash   • Morphine Itching     Pt states that she can tolerate small dose of morphine (can tolerate up to 2mg dosage).   • Pork Allergy Itching     Itchy skin       DIET  Orders Placed This Encounter   Procedures   • Diet Order Diet: Renal     Standing Status:   Standing     Number of Occurrences:   1     Order Specific Question:   Diet:     Answer:   Renal [8]       ACTIVITY  As tolerated.  Weight bearing as tolerated    CONSULTATIONS  Nephrology    PROCEDURES  None    I personally spent 33 minutes with the patient on day of discharge going over his discharge plan and making sure he fully understood it.

## 2021-09-17 NOTE — DISCHARGE PLANNING
[facility] Transitional [facility] Coordination    Referral from:  Dr. Lr  Insurance Provider on Facesheet: Medicaid FFS  Potential Rehab Diagnosis:      Chart review indicates patient has on going medical management, may have therapy needs to possibly meet inpatient rehab facility criteria with the goal of returning to community.    D/C support:  To be determined     Physiatry consultation pended while waiting for additional information.  Acute onset left-sided weakness.  CT head, MRI brain negative for acute infarct.  Workup ongoing.  Would welcome PT/OT as clinically appropriate.  TCC will follow.  Please reach out sooner if PMR consult requested for medical management.      Last Covid test:       Thank you for the referral.

## 2021-09-17 NOTE — PROGRESS NOTES
Assumed patient care. Patient A&O x 4 on RA. Patient has tele box in place. Patient complaining of headache, and received tylenol. Patient updated on plan of care, verbalizes understanding. Patient has fall precautions in place, call light within reach. Patient has bed in low and locked position. Will continue to monitor.       COVID-19 surge in effect.

## 2021-09-17 NOTE — PROGRESS NOTES
Assumed care of PT A&O 4.  used. Pt resting in bed with no signs of labored breathing.On RA. Pt denies pain but complains of N/V; zofran given see MAR. Pt vomited oral BP meds; IV meds given; see MAR. Tele monitor in place, cardiac rhythm being monitored. Call light within reach, bed in lowest position, upper bed rails up. Pt was updated on plan of care for the day. Will continue to monitor.

## 2021-09-17 NOTE — PROGRESS NOTES
Patient has chosen to leave the hospital against medical advice. The attending physician has not discharged the patient. Patient is not a risk to himself or others. I have discussed with the patient the following:  Physician has not determined patient is ready for discharge, Risks and consequences of leaving the hospital too soon and Benefit of continued hospitalization.        Attending physician has been notified.      Patient wheeled down to [facility] by CNA.

## 2021-09-17 NOTE — PROGRESS NOTES
Notified Dr. Tuttle of patient's uncontrolled blood pressure and nausea/vomiting. Pending orders.

## 2021-09-18 LAB
DSDNA AB TITR SER CLIF: 10 IU (ref 0–24)
NUCLEAR IGG SER QL IA: DETECTED

## 2021-09-19 LAB
ANA INTERPRETIVE COMMENT Q5143: ABNORMAL
ANA PATTERN Q5144: ABNORMAL
ANA TITER Q5145: ABNORMAL
ANTINUCLEAR ANTIBODY (ANA), HEP-2, IGG Q5142: DETECTED

## 2021-09-21 ENCOUNTER — PATIENT OUTREACH (OUTPATIENT)
Dept: HEALTH INFORMATION MANAGEMENT | Facility: OTHER | Age: 41
End: 2021-09-21

## 2021-09-21 SDOH — ECONOMIC STABILITY: FOOD INSECURITY: WITHIN THE PAST 12 MONTHS, THE FOOD YOU BOUGHT JUST DIDN'T LAST AND YOU DIDN'T HAVE MONEY TO GET MORE.: NEVER TRUE

## 2021-09-21 SDOH — ECONOMIC STABILITY: FOOD INSECURITY: WITHIN THE PAST 12 MONTHS, YOU WORRIED THAT YOUR FOOD WOULD RUN OUT BEFORE YOU GOT MONEY TO BUY MORE.: SOMETIMES TRUE

## 2021-09-21 SDOH — ECONOMIC STABILITY: INCOME INSECURITY: HOW HARD IS IT FOR YOU TO PAY FOR THE VERY BASICS LIKE FOOD, HOUSING, MEDICAL CARE, AND HEATING?: HARD

## 2021-09-21 NOTE — PROGRESS NOTES
Community Health Worker Intake    • Social determinates of health intake completed.  • Identified barriers to: financial insecurity.  • Contact information provided to Mago Farfan  • Outpatient assessment completed.  • Did the patient receive medications post discharge: No    CHW Lloyd reached out to pt via TC to f/u after d/c and introduce CCM services. Pt stated she was doing okay but does not have a PCP or a f/u appt yet. She is also uninsured. CHW mailed [facility] registration packet, sliding scale fee application, Good Rx card and [facility] flyer. Pt stated she will get established at [facility] and is an active . However, pt lost her car keys. CHW contacted [facility] but keys have not been found. CHW also reached out to [facility] to inquire about her keys. Per [facility], they will reach out to their lost and found and call pt if keys are located. Pt has family member who can give her a ride for now or has been travelling on foot. Pt expressed some financial insecurity and was made aware of [facility]. No additional resources needed at this time.     Plan: F/u to identify any additional needs.

## 2021-09-24 ENCOUNTER — PATIENT OUTREACH (OUTPATIENT)
Dept: HEALTH INFORMATION MANAGEMENT | Facility: OTHER | Age: 41
End: 2021-09-24

## 2021-09-24 NOTE — PROGRESS NOTES
Community Health Worker Follow-Up    • Contact information provided to Mago Farfan   • Outpatient assessment completed.  • Did the patient receive medications post discharge: No    CHW Lloyd reached out to pt via TC to f/u. Pt stated she was contacted by [facility] and had her keys located from their lost and found. She was informed of the resources that CHW mailed to her and will establish with a PCP at [facility]. No other needs at this time.     Plan: d/c due to current goals/needs met 9/24.

## 2021-10-12 RX ORDER — CARVEDILOL 12.5 MG/1
TABLET ORAL
Qty: 180 TABLET | Refills: 1 | OUTPATIENT
Start: 2021-10-12

## 2021-10-18 RX ORDER — AMLODIPINE BESYLATE 5 MG/1
5 TABLET ORAL DAILY
Qty: 90 TABLET | Refills: 1 | OUTPATIENT
Start: 2021-10-18

## 2021-12-15 ENCOUNTER — APPOINTMENT (OUTPATIENT)
Dept: RADIOLOGY | Facility: MEDICAL CENTER | Age: 41
End: 2021-12-15
Attending: EMERGENCY MEDICINE
Payer: MEDICAID

## 2021-12-15 ENCOUNTER — HOSPITAL ENCOUNTER (EMERGENCY)
Facility: MEDICAL CENTER | Age: 41
End: 2021-12-15
Attending: EMERGENCY MEDICINE
Payer: MEDICAID

## 2021-12-15 VITALS
HEIGHT: 66 IN | HEART RATE: 96 BPM | DIASTOLIC BLOOD PRESSURE: 95 MMHG | OXYGEN SATURATION: 98 % | WEIGHT: 132.94 LBS | BODY MASS INDEX: 21.36 KG/M2 | RESPIRATION RATE: 14 BRPM | TEMPERATURE: 98.1 F | SYSTOLIC BLOOD PRESSURE: 131 MMHG

## 2021-12-15 DIAGNOSIS — N18.6 ESRD (END STAGE RENAL DISEASE) ON DIALYSIS (HCC): ICD-10-CM

## 2021-12-15 DIAGNOSIS — Z99.2 ESRD (END STAGE RENAL DISEASE) ON DIALYSIS (HCC): ICD-10-CM

## 2021-12-15 DIAGNOSIS — I16.0 HYPERTENSIVE URGENCY: ICD-10-CM

## 2021-12-15 LAB
ALBUMIN SERPL BCP-MCNC: 4.3 G/DL (ref 3.2–4.9)
ALBUMIN/GLOB SERPL: 1.4 G/DL
ALP SERPL-CCNC: 89 U/L (ref 30–99)
ALT SERPL-CCNC: 34 U/L (ref 2–50)
ANION GAP SERPL CALC-SCNC: 14 MMOL/L (ref 7–16)
AST SERPL-CCNC: 24 U/L (ref 12–45)
BASOPHILS # BLD AUTO: 1.3 % (ref 0–1.8)
BASOPHILS # BLD: 0.04 K/UL (ref 0–0.12)
BILIRUB SERPL-MCNC: 0.3 MG/DL (ref 0.1–1.5)
BUN SERPL-MCNC: 18 MG/DL (ref 8–22)
CALCIUM SERPL-MCNC: 9.3 MG/DL (ref 8.4–10.2)
CHLORIDE SERPL-SCNC: 94 MMOL/L (ref 96–112)
CO2 SERPL-SCNC: 30 MMOL/L (ref 20–33)
CREAT SERPL-MCNC: 4.73 MG/DL (ref 0.5–1.4)
EKG IMPRESSION: NORMAL
EOSINOPHIL # BLD AUTO: 0.16 K/UL (ref 0–0.51)
EOSINOPHIL NFR BLD: 5.3 % (ref 0–6.9)
ERYTHROCYTE [DISTWIDTH] IN BLOOD BY AUTOMATED COUNT: 38.8 FL (ref 35.9–50)
GLOBULIN SER CALC-MCNC: 3 G/DL (ref 1.9–3.5)
GLUCOSE SERPL-MCNC: 80 MG/DL (ref 65–99)
HCT VFR BLD AUTO: 35.2 % (ref 37–47)
HGB BLD-MCNC: 11.8 G/DL (ref 12–16)
IMM GRANULOCYTES # BLD AUTO: 0.01 K/UL (ref 0–0.11)
IMM GRANULOCYTES NFR BLD AUTO: 0.3 % (ref 0–0.9)
LYMPHOCYTES # BLD AUTO: 0.52 K/UL (ref 1–4.8)
LYMPHOCYTES NFR BLD: 17.2 % (ref 22–41)
MCH RBC QN AUTO: 30.2 PG (ref 27–33)
MCHC RBC AUTO-ENTMCNC: 33.5 G/DL (ref 33.6–35)
MCV RBC AUTO: 90 FL (ref 81.4–97.8)
MONOCYTES # BLD AUTO: 0.36 K/UL (ref 0–0.85)
MONOCYTES NFR BLD AUTO: 11.9 % (ref 0–13.4)
NEUTROPHILS # BLD AUTO: 1.94 K/UL (ref 2–7.15)
NEUTROPHILS NFR BLD: 64 % (ref 44–72)
NRBC # BLD AUTO: 0 K/UL
NRBC BLD-RTO: 0 /100 WBC
PLATELET # BLD AUTO: 134 K/UL (ref 164–446)
PMV BLD AUTO: 10.7 FL (ref 9–12.9)
POTASSIUM SERPL-SCNC: 4.1 MMOL/L (ref 3.6–5.5)
PROT SERPL-MCNC: 7.3 G/DL (ref 6–8.2)
RBC # BLD AUTO: 3.91 M/UL (ref 4.2–5.4)
SODIUM SERPL-SCNC: 138 MMOL/L (ref 135–145)
TROPONIN T SERPL-MCNC: 29 NG/L (ref 6–19)
WBC # BLD AUTO: 3 K/UL (ref 4.8–10.8)

## 2021-12-15 PROCEDURE — 96374 THER/PROPH/DIAG INJ IV PUSH: CPT

## 2021-12-15 PROCEDURE — 700102 HCHG RX REV CODE 250 W/ 637 OVERRIDE(OP): Performed by: EMERGENCY MEDICINE

## 2021-12-15 PROCEDURE — 71045 X-RAY EXAM CHEST 1 VIEW: CPT

## 2021-12-15 PROCEDURE — 85025 COMPLETE CBC W/AUTO DIFF WBC: CPT

## 2021-12-15 PROCEDURE — 93005 ELECTROCARDIOGRAM TRACING: CPT | Performed by: EMERGENCY MEDICINE

## 2021-12-15 PROCEDURE — 99285 EMERGENCY DEPT VISIT HI MDM: CPT

## 2021-12-15 PROCEDURE — A9270 NON-COVERED ITEM OR SERVICE: HCPCS | Performed by: EMERGENCY MEDICINE

## 2021-12-15 PROCEDURE — 700111 HCHG RX REV CODE 636 W/ 250 OVERRIDE (IP): Performed by: EMERGENCY MEDICINE

## 2021-12-15 PROCEDURE — 80053 COMPREHEN METABOLIC PANEL: CPT

## 2021-12-15 PROCEDURE — 84484 ASSAY OF TROPONIN QUANT: CPT

## 2021-12-15 RX ORDER — HYDRALAZINE HYDROCHLORIDE 20 MG/ML
20 INJECTION INTRAMUSCULAR; INTRAVENOUS ONCE
Status: COMPLETED | OUTPATIENT
Start: 2021-12-15 | End: 2021-12-15

## 2021-12-15 RX ORDER — HYDRALAZINE HYDROCHLORIDE 20 MG/ML
20 INJECTION INTRAMUSCULAR; INTRAVENOUS ONCE
Status: DISCONTINUED | OUTPATIENT
Start: 2021-12-15 | End: 2021-12-15

## 2021-12-15 RX ORDER — CLONIDINE HYDROCHLORIDE 0.1 MG/1
0.2 TABLET ORAL ONCE
Status: COMPLETED | OUTPATIENT
Start: 2021-12-15 | End: 2021-12-15

## 2021-12-15 RX ORDER — AMLODIPINE BESYLATE 5 MG/1
5 TABLET ORAL ONCE
Status: DISCONTINUED | OUTPATIENT
Start: 2021-12-15 | End: 2021-12-15

## 2021-12-15 RX ADMIN — CLONIDINE HYDROCHLORIDE 0.2 MG: 0.1 TABLET ORAL at 14:48

## 2021-12-15 RX ADMIN — HYDRALAZINE HYDROCHLORIDE 20 MG: 20 INJECTION INTRAMUSCULAR; INTRAVENOUS at 14:48

## 2021-12-15 ASSESSMENT — FIBROSIS 4 INDEX: FIB4 SCORE: 1.21

## 2021-12-15 NOTE — ED NOTES
Med rec updated and complete  Allergies reviewed  Pt reports that she stopped taking AMLODIPINE 5MG and LOSARTAN 100MG about 3 months, because she felt like her heart was beating to fast.  Pt reports no antibiotics in the last 30 days.    No current facility-administered medications on file prior to encounter.     Current Outpatient Medications on File Prior to Encounter   Medication Sig Dispense Refill   • Acetaminophen (TYLENOL PO) Take 1 Tablet by mouth 2 times a day as needed (For pain). Pt is not sure the strength     • Nutritional Supplements (NOVASOURCE RENAL PO) Take 1 Capsule by mouth every day.     • carvedilol (COREG) 12.5 MG Tab Take 1 Tablet by mouth 2 times a day with meals. 60 Tablet 0   • CALCIUM PO Take 2 Tablets by mouth 3 times a day with meals.     • Multiple Vitamins-Minerals (ONE-A-DAY WOMENS PO) Take 1 Tablet by mouth every day.

## 2021-12-15 NOTE — ED PROVIDER NOTES
ED Provider Note    CHIEF COMPLAINT  Chief Complaint   Patient presents with   • Hypertension     Reports she takes carvediolol at home for same. Last dialysis was today. Given 0.2mg clonodine at dialysis PTA.   • Dizziness   • Headache     R sided h/a with dizziness. Denies CP.         HPI  Mago Farfan is a 41 y.o. female who presents to the ED with complaints of high blood pressure dizziness intermittent chest discomfort.  The patient has a history of lupus nephritis with end-stage renal disease on dialysis.  Today while she was on dialysis she started having increasing high blood pressure then started feeling very dizzy mild headache it started feeling some mild chest pain.  Because of this the patient was given apparently 0.2 mg of clonidine at dialysis but then sent to the emergency department for further evaluation care.  Upon arrival here she states that her headache is not all that bad but she describes more of a sensation of dizziness she did have some chest pain at dialysis but currently has no chest pain.  Patient states that she used to be on amlodipine with carvedilol as well as clonidine but she has not been taking the amlodipine for 2 weeks.  Patient denies any other symptoms presents for evaluation.    REVIEW OF SYSTEMS  See HPI for further details. All other systems are negative.     PAST MEDICAL HISTORY  Past Medical History:   Diagnosis Date   • Dialysis patient ([facility])    • EKG abnormality     borderline prolonged QTC.   • ESRD (end stage renal disease) on dialysis ([facility]) 2014   • Hypertension    • Lupus nephritis, ISN/RPS class IV ([facility]) 2006       FAMILY HISTORY  Family History   Problem Relation Age of Onset   • Kidney Disease Neg Hx        SOCIAL HISTORY  Social History     Socioeconomic History   • Marital status:      Spouse name: Not on file   • Number of children: Not on file   • Years of education: Not on file   • Highest education level: Not on file   Occupational History  "  • Not on file   Tobacco Use   • Smoking status: Never Smoker   • Smokeless tobacco: Never Used   Vaping Use   • Vaping Use: Never used   Substance and Sexual Activity   • Alcohol use: No   • Drug use: No   • Sexual activity: Not on file   Other Topics Concern   • Not on file   Social History Narrative   • Not on file     Social Determinants of Health     Financial Resource Strain: High Risk   • Difficulty of Paying Living Expenses: Hard   Food Insecurity: Food Insecurity Present   • Worried About Running Out of Food in the Last Year: Sometimes true   • Ran Out of Food in the Last Year: Never true   Transportation Needs: No Transportation Needs   • Lack of Transportation (Medical): No   • Lack of Transportation (Non-Medical): No   Physical Activity:    • Days of Exercise per Week: Not on file   • Minutes of Exercise per Session: Not on file   Stress:    • Feeling of Stress : Not on file   Social Connections:    • Frequency of Communication with Friends and Family: Not on file   • Frequency of Social Gatherings with Friends and Family: Not on file   • Attends Nondenominational Services: Not on file   • Active Member of Clubs or Organizations: Not on file   • Attends Club or Organization Meetings: Not on file   • Marital Status: Not on file   Intimate Partner Violence:    • Fear of Current or Ex-Partner: Not on file   • Emotionally Abused: Not on file   • Physically Abused: Not on file   • Sexually Abused: Not on file   Housing Stability:    • Unable to Pay for Housing in the Last Year: Not on file   • Number of Places Lived in the Last Year: Not on file   • Unstable Housing in the Last Year: Not on file      Quinton Christy M.D.      SURGICAL HISTORY  Past Surgical History:   Procedure Laterality Date   • OTHER CARDIAC SURGERY      \"liquid drained from heart\", per pt   • PRIMARY C SECTION       (of some type)       CURRENT MEDICATIONS  Home Medications     Reviewed by Dejuan Chun (Pharmacy Tech) on " "12/15/21 at 1513  Med List Status: Complete   Medication Last Dose Status   Acetaminophen (TYLENOL PO) 12/13/2021 Active   CALCIUM PO 12/15/2021 Active   carvedilol (COREG) 12.5 MG Tab 12/14/2021 Active   Multiple Vitamins-Minerals (ONE-A-DAY WOMENS PO) 12/14/2021 Active   Nutritional Supplements (NOVASOURCE RENAL PO) > 2 weeks Active              No current facility-administered medications on file prior to encounter.     Current Outpatient Medications on File Prior to Encounter   Medication Sig Dispense Refill   • Acetaminophen (TYLENOL PO) Take 1 Tablet by mouth 2 times a day as needed (For pain). Pt is not sure the strength     • Nutritional Supplements (NOVASOURCE RENAL PO) Take 1 Capsule by mouth every day.     • carvedilol (COREG) 12.5 MG Tab Take 1 Tablet by mouth 2 times a day with meals. 60 Tablet 0   • CALCIUM PO Take 2 Tablets by mouth 3 times a day with meals.     • Multiple Vitamins-Minerals (ONE-A-DAY WOMENS PO) Take 1 Tablet by mouth every day.           ALLERGIES  Allergies   Allergen Reactions   • Labetalol Palpitations     Fast heart rate, rash, HTN   • Iodine Rash     Rash   • Morphine Itching     Pt states that she can tolerate small dose of morphine (can tolerate up to 2mg dosage).   • Pork Allergy Itching     Itchy skin       PHYSICAL EXAM  VITAL SIGNS: /89   Pulse (!) 103   Temp 36.5 °C (97.7 °F) (Temporal)   Resp 17   Ht 1.676 m (5' 6\")   Wt 60.3 kg (132 lb 15 oz)   SpO2 98%   BMI 21.46 kg/m²    Pulse Oximetry was obtained. It showed a reading of Pulse Oximetry: 100 %.  I interpreted this as nonhypoxic.     Constitutional: Well developed, Well nourished, No acute distress, Non-toxic appearance.   HENT: Normocephalic, Atraumatic, Bilateral external ears normal, bilateral tympanic membranes normal, Oropharynx mucous membranes, No oral exudates, Nose normal.   Eyes:  conjunctiva is normal, there are no signs of exudate.   Neck: Supple, no cervical lymphadenopathy, no meningeal " signs..   Lymphatic: No lymphadenopathy noted.   Cardiovascular: Regular rate and rhythm without murmurs gallops or rubs.   Thorax & Lungs: Lungs are clear to auscultation bilaterally, there are no wheezes no rales. Chest wall is nontender.  Abdomen: Soft, nontender nondistended. Bowel sounds are present.   Skin: Warm, Dry, No erythema,   Back: No tenderness, No CVA tenderness.  Musculoskeletal: Good range of motion in all major joints. No tenderness to palpation or major deformities noted. Intact distal pulses, no clubbing, no cyanosis, no edema is a fistula left forearm normal thrill  Neurologic: Alert & oriented x 3, Normal motor function, Normal sensory function, No focal deficits noted.   Psychiatric: Affect normal, Judgment normal, Mood normal.     EKG  Interpreted below by myself    RADIOLOGY/PROCEDURES  DX-CHEST-PORTABLE (1 VIEW)   Final Result      No radiographic evidence of acute cardiopulmonary process.          Results for orders placed or performed during the hospital encounter of 12/15/21   CBC with Differential   Result Value Ref Range    WBC 3.0 (L) 4.8 - 10.8 K/uL    RBC 3.91 (L) 4.20 - 5.40 M/uL    Hemoglobin 11.8 (L) 12.0 - 16.0 g/dL    Hematocrit 35.2 (L) 37.0 - 47.0 %    MCV 90.0 81.4 - 97.8 fL    MCH 30.2 27.0 - 33.0 pg    MCHC 33.5 (L) 33.6 - 35.0 g/dL    RDW 38.8 35.9 - 50.0 fL    Platelet Count 134 (L) 164 - 446 K/uL    MPV 10.7 9.0 - 12.9 fL    Neutrophils-Polys 64.00 44.00 - 72.00 %    Lymphocytes 17.20 (L) 22.00 - 41.00 %    Monocytes 11.90 0.00 - 13.40 %    Eosinophils 5.30 0.00 - 6.90 %    Basophils 1.30 0.00 - 1.80 %    Immature Granulocytes 0.30 0.00 - 0.90 %    Nucleated RBC 0.00 /100 WBC    Neutrophils (Absolute) 1.94 (L) 2.00 - 7.15 K/uL    Lymphs (Absolute) 0.52 (L) 1.00 - 4.80 K/uL    Monos (Absolute) 0.36 0.00 - 0.85 K/uL    Eos (Absolute) 0.16 0.00 - 0.51 K/uL    Baso (Absolute) 0.04 0.00 - 0.12 K/uL    Immature Granulocytes (abs) 0.01 0.00 - 0.11 K/uL    NRBC (Absolute) 0.00  K/uL   Complete Metabolic Panel (CMP)   Result Value Ref Range    Sodium 138 135 - 145 mmol/L    Potassium 4.1 3.6 - 5.5 mmol/L    Chloride 94 (L) 96 - 112 mmol/L    Co2 30 20 - 33 mmol/L    Anion Gap 14.0 7.0 - 16.0    Glucose 80 65 - 99 mg/dL    Bun 18 8 - 22 mg/dL    Creatinine 4.73 (H) 0.50 - 1.40 mg/dL    Calcium 9.3 8.4 - 10.2 mg/dL    AST(SGOT) 24 12 - 45 U/L    ALT(SGPT) 34 2 - 50 U/L    Alkaline Phosphatase 89 30 - 99 U/L    Total Bilirubin 0.3 0.1 - 1.5 mg/dL    Albumin 4.3 3.2 - 4.9 g/dL    Total Protein 7.3 6.0 - 8.2 g/dL    Globulin 3.0 1.9 - 3.5 g/dL    A-G Ratio 1.4 g/dL   Troponin STAT   Result Value Ref Range    Troponin T 29 (H) 6 - 19 ng/L   ESTIMATED GFR   Result Value Ref Range    GFR If  12 (A) >60 mL/min/1.73 m 2    GFR If Non African American 10 (A) >60 mL/min/1.73 m 2   EKG   Result Value Ref Range    Report       [facility] Emergency Dept.    Test Date:  2021-12-15  Pt Name:    MARGARITA DEWEY                 Department: [facility]  MRN:        [id number]                      Room:       [place] [address]  Gender:     Female                       Technician: TRICE  :        [id number]                   Requested By:ER TRIAGE PROTOCOL  Order #:    [id number]                    Reading MD: VANDANA CLATYON MD    Measurements  Intervals                                Axis  Rate:       63                           P:          6  MT:         155                          QRS:        44  QRSD:       81                           T:          171  QT:         447  QTc:        458    Interpretive Statements  Sinus rhythm  Probable LVH with secondary repol abnrm  Compared to ECG 2021 12:09:09  No significant changes  Electronically Signed On 12- 14:33:48 PST by VANDANA CLAYTON MD           COURSE & MEDICAL DECISION MAKING  Pertinent Labs & Imaging studies reviewed. (See chart for details)  She presents to the ED for evaluation.  Clinically the patient is ill in  "appearance generally.  Her blood pressure was significantly elevated so I started with a dose of IV hydralazine as well as oral clonidine 0.2 mg.  Laboratory studies were drawn as well as an EKG the EKG is unchanged from prior, potassium is normal at 4.1 the patient's creatinine is elevated.  Troponin is chronically elevated but lower than normal at 26.  After the initial treatment her blood pressures did come down to 136/78 upon final evaluation.  She still states that she feels weak still describing a mild headache but denies any overt visual changes or any other symptoms.  At this point I do feel the patient does have hypertensive disease.  She stopped taking 2 medications about 3 months ago I recommended starting those medications back again and she is reluctant to start them because she states \"they did not help.\"  At this point I recommended for her to follow-up with Dr. Cortez for further outpatient treatment and care and return as needed.    FINAL IMPRESSION  1. Hypertensive urgency     2. ESRD (end stage renal disease) on dialysis (HCC)          The patient will return for new or worsening symptoms and is stable at the time of discharge.    The patient is referred to a primary physician for blood pressure management, diabetic screening, and for all other preventative health concerns.        DISPOSITION:  Patient will be discharged home in stable condition.    FOLLOW UP:  Latisha Merritt M.D.  [address]  [address]  [place] [id number] [phone]    Schedule an appointment as soon as possible for a visit   For further evaluation, Return if any symptoms worsen      OUTPATIENT MEDICATIONS:  New Prescriptions    No medications on file               Electronically signed by: Adán Hernández M.D., 12/15/2021 2:34 PM    "

## 2022-03-01 RX ORDER — CARVEDILOL 12.5 MG/1
TABLET ORAL
Qty: 60 TABLET | Refills: 0 | Status: SHIPPED | OUTPATIENT
Start: 2022-03-01 | End: 2022-04-13

## 2022-04-13 RX ORDER — CARVEDILOL 12.5 MG/1
TABLET ORAL
Qty: 60 TABLET | Refills: 0 | Status: SHIPPED | OUTPATIENT
Start: 2022-04-13 | End: 2022-10-17

## 2022-07-27 ENCOUNTER — HOSPITAL ENCOUNTER (OUTPATIENT)
Dept: RADIOLOGY | Facility: MEDICAL CENTER | Age: 42
End: 2022-07-27
Payer: COMMERCIAL

## 2022-07-27 ENCOUNTER — APPOINTMENT (OUTPATIENT)
Dept: RADIOLOGY | Facility: MEDICAL CENTER | Age: 42
End: 2022-07-27
Payer: COMMERCIAL

## 2022-07-27 DIAGNOSIS — H47.093 2ND NERVE PALSY, BILATERAL: ICD-10-CM

## 2022-07-27 PROCEDURE — 70540 MRI ORBIT/FACE/NECK W/O DYE: CPT

## 2022-07-27 PROCEDURE — 70551 MRI BRAIN STEM W/O DYE: CPT

## 2022-09-12 RX ORDER — SEVELAMER CARBONATE 800 MG/1
TABLET, FILM COATED ORAL
Qty: 90 TABLET | Refills: 6 | Status: SHIPPED | OUTPATIENT
Start: 2022-09-12 | End: 2022-10-17

## 2022-10-17 ENCOUNTER — OFFICE VISIT (OUTPATIENT)
Dept: RHEUMATOLOGY | Facility: MEDICAL CENTER | Age: 42
End: 2022-10-17
Attending: STUDENT IN AN ORGANIZED HEALTH CARE EDUCATION/TRAINING PROGRAM
Payer: COMMERCIAL

## 2022-10-17 ENCOUNTER — APPOINTMENT (OUTPATIENT)
Dept: RHEUMATOLOGY | Facility: MEDICAL CENTER | Age: 42
End: 2022-10-17
Payer: COMMERCIAL

## 2022-10-17 VITALS
RESPIRATION RATE: 16 BRPM | SYSTOLIC BLOOD PRESSURE: 112 MMHG | DIASTOLIC BLOOD PRESSURE: 80 MMHG | BODY MASS INDEX: 20.94 KG/M2 | TEMPERATURE: 97.8 F | WEIGHT: 133.4 LBS | OXYGEN SATURATION: 100 % | HEIGHT: 67 IN | HEART RATE: 70 BPM

## 2022-10-17 DIAGNOSIS — Z99.2 ESRD ON HEMODIALYSIS (HCC): ICD-10-CM

## 2022-10-17 DIAGNOSIS — N18.6 ESRD ON HEMODIALYSIS (HCC): ICD-10-CM

## 2022-10-17 DIAGNOSIS — M32.14 STAGE IV LUPUS NEPHRITIS (WHO) (HCC): ICD-10-CM

## 2022-10-17 PROCEDURE — 99204 OFFICE O/P NEW MOD 45 MIN: CPT | Performed by: STUDENT IN AN ORGANIZED HEALTH CARE EDUCATION/TRAINING PROGRAM

## 2022-10-17 PROCEDURE — 99211 OFF/OP EST MAY X REQ PHY/QHP: CPT | Performed by: STUDENT IN AN ORGANIZED HEALTH CARE EDUCATION/TRAINING PROGRAM

## 2022-10-17 RX ORDER — HYDROXYCHLOROQUINE SULFATE 200 MG/1
200 TABLET, FILM COATED ORAL DAILY
Qty: 90 TABLET | Refills: 3 | Status: CANCELLED | OUTPATIENT
Start: 2022-10-17 | End: 2023-01-15

## 2022-10-17 ASSESSMENT — FIBROSIS 4 INDEX: FIB4 SCORE: 1.23

## 2022-10-17 NOTE — PATIENT INSTRUCTIONS
AFTER VISIT INSTRUCTIONS    Below are important information to help you navigate your healthcare needs and help us serve you safely and effectively:  If laboratory tests and/or imaging studies were ordered, remember to go get them done.  If new prescriptions or refills were sent to the pharmacy, remember to go pick them up.  Take your medications exactly as prescribed unless instructed otherwise.  If there are significant findings on your lab tests and imaging studies that warrant further action, I will notify you with explanations via [employer] or phone call, otherwise you can view them on locrt and let me know if you have any questions.  Sign up for [employer] if you have not already done so, in order to have access to the results of your lab tests and imaging studies, and to be able to send and receive messages from me.  Note that [employer] messages are typically read during office hours only and may take 1-7 days before a response depending on the urgency of the situation and how busy my schedule is.  In general, [employer] messaging is for non-urgent matters that do not require immediate attention, so for urgent matters that cannot wait, you are advised to go to an urgent care.

## 2022-10-17 NOTE — PROGRESS NOTES
[facility] RHEUMATOLOGY  [address], [address], [place] [address]  Phone: [phone] ? Fax: [phone]    RHEUMATOLOGY NEW PATIENT VISIT NOTE      DATE OF SERVICE: 10/17/2022    REFERRING PROVIDER:  ADA Triplett  [address]  [place] [id number]    PATIENT IDENTIFICATION:  Mago Guy  [id number]  [address]  [place] [address]    YOB: 1980    MEDICAL RECORD NUMBER: [id number]      Subjective        CHIEF COMPLAINT:   Chief Complaint   Patient presents with    New Patient     Systemic lupus erythematosus       HISTORY OF PRESENT ILLNESS:  Mago Guy is a 42 y.o. female with pertinent history notable for SLE with lupus nephritis diagnosed in 2005 with resultant ESRD on hemodialysis awaiting kidney transplant, systolic/diastolic CHF, pulmonary HTN, CVA, and multiple comorbidities. Reportedly diagnosed with lupus nephritis in 2005 by a nephrologist in [place], but subsequently seen by three different rheumatologists (in [place], [place], and [place]) who all reportedly told her that she did not have lupus due to absence of lupus symptoms. Presently reports only mild intermittent heartburn, muscle weakness, and cold-induced numbness of hands and feet with no Raynaud's phenomenon, but otherwise doing quite well with no significant symptoms.    Pertinent treatment history as of 2007: Azathioprine (stopped between 2006 and 2007 due to cytopenias and debilitating fatigue), and mycophenolate (stopped between 2006 and 2007 due to intolerable GI side effects and debilitating fatigue), hydroxychloroquine (stopped between 2006 and 2007 due to concern for contributory side effects to those experienced with azathioprine and mycophenolate), and prednisone taper (helpful).    Pertinent lab results as of 7/2022: Positive GIORGIO 1:320 homogenous pattern with negative reflex in 9/2021 (previously with positive anti-dsDNA 222 in 4/2006 from 484 in 1/2005); low C3 of 71.6  "and low C4 of 18.1 (in 2021); negative/normal LAC (in 2006), ANCA and anti-CL (in 2016), HBV and HCV.  Renal biopsies in 2005 and 2006: Diffuse proliferative crescentic lupus nephritis, WHO class IV, with treatment effect.    REVIEW OF SYSTEMS:  Except as noted in the history above, relevant review of systems with emphasis on autoimmune inflammatory processes was otherwise negative.      ACTIVE PROBLEM LIST:  Patient Active Problem List   Diagnosis    Stage IV lupus nephritis (WHO) (HCC)    Anasarca    Leukopenia    Moderate protein malnutrition (HCC)    Non compliance w medication regimen    Anemia of chronic disease    Hyponatremia    Ascites    End stage renal disease (HCC)    Normocytic anemia    Pleural effusion    Noncompliance    Abdominal pain    ESRD on hemodialysis (HCC)    Elevated LFTs    Chronic combined systolic and diastolic CHF (congestive heart failure) (HCC)    Elevated troponin    History of lupus nephritis    Acute CVA (cerebrovascular accident) (HCC)    Febrile illness, acute    Thrombocytopenia (HCC)    Systolic CHF, acute (HCC)    Systolic heart failure secondary to hypertension ([facility])    Abnormal brain MRI    Prolonged Q-T interval on ECG    Essential hypertension    Hypertensive urgency    Pulmonary hypertension (HCC)    Chest pain    Hypokalemia    History of CVA (cerebrovascular accident)    Systemic lupus erythematosus with glomerular disease (HCC)   No problem-specific Assessment & Plan notes found for this encounter.      PAST MEDICAL HISTORY:  Past Medical History:   Diagnosis Date    Dialysis patient ([facility])     EKG abnormality     borderline prolonged QTC.    ESRD (end stage renal disease) on dialysis ([facility])     Hypertension     Lupus nephritis, ISN/RPS class IV ([facility])        PAST SURGICAL HISTORY:  Past Surgical History:   Procedure Laterality Date    OTHER CARDIAC SURGERY      \"liquid drained from heart\", per pt    PRIMARY C SECTION       (of some type) " "      MEDICATIONS:  Current Outpatient Medications   Medication Sig Dispense Refill    Acetaminophen (TYLENOL PO) Take 1 Tablet by mouth 2 times a day as needed (For pain). Pt is not sure the strength      Nutritional Supplements (NOVASOURCE RENAL PO) Take 1 Capsule by mouth every day.      CALCIUM PO Take 2 Tablets by mouth 3 times a day with meals.      Multiple Vitamins-Minerals (ONE-A-DAY WOMENS PO) Take 1 Tablet by mouth every day.       No current facility-administered medications for this visit.       ALLERGIES:   Allergies   Allergen Reactions    Labetalol Palpitations     Fast heart rate, rash, HTN    Iodine Rash     Rash    Morphine Itching     Pt states that she can tolerate small dose of morphine (can tolerate up to 2mg dosage).    Pork Allergy Itching     Itchy skin       IMMUNIZATIONS:  Immunization History   Administered Date(s) Administered    INFLUENZA TIV (IM) 09/27/2014, 12/29/2015    Influenza Seasonal Injectable - Historical Data 09/27/2014, 12/29/2015    Influenza Vaccine Adult HD 11/01/2019    Influenza Vaccine Pediatric Split - Historical Data 12/01/2005, 10/16/2006    Influenza Vaccine Quad Inj (Pf) 02/15/2017    MODERNA SARS-COV-2 VACCINE (12+) 01/31/2022, 05/07/2022    Pneumococcal Vaccine (UF) - HISTORICAL DATA 09/27/2014    Pneumococcal polysaccharide vaccine (PPSV-23) 03/20/2019    Tdap Vaccine 10/16/2006       SOCIAL HISTORY:   Social History     Tobacco Use    Smoking status: Never    Smokeless tobacco: Never   Vaping Use    Vaping Use: Never used   Substance Use Topics    Alcohol use: No    Drug use: No       FAMILY HISTORY:  Family History   Problem Relation Age of Onset    Kidney Disease Neg Hx        Objective        Vital Signs: /80 (BP Location: Right arm, Patient Position: Sitting, BP Cuff Size: Adult)   Pulse 70   Temp 36.6 °C (97.8 °F) (Temporal)   Resp 16   Ht 1.702 m (5' 7\")   Wt 60.5 kg (133 lb 6.4 oz)   SpO2 100% Body mass index is 20.89 kg/m².    General: " Appears well and comfortable  Eyes: No scleral or conjunctival lesions  ENT: No apparent oral or nasal lesions  Head/Neck: No apparent scalp or neck lesions  Cardiovascular: Regular rate and rhythm; no pericardial rubs  Respiratory: Breathing quiet and unlabored; no rales or pleural rubs  Gastrointestinal: No organomegaly or abdominal masses  Integumentary: No significant cutaneous lesions or discolorations  Musculoskeletal: No significant joint tenderness, periarticular soft tissue swelling, warmth, erythema, or overt signs of synovitis; no significant restriction in range of motion of joints examined  Neurologic: No focal sensory or motor deficits  Psychiatric: Mood and affect appropriate      LABORATORY RESULTS REVIEWED AND INTERPRETED BY ME:  Lab Results   Component Value Date/Time    SEDRATEWES 9 09/16/2021 05:20 PM    CREACTPROT <0.30 09/16/2021 12:00 PM    URICACID 4.1 08/14/2006 12:25 PM     Lab Results   Component Value Date/Time    ANTINUCAB Detected (A) 09/16/2021 12:00 PM    O5GLITSHLUL 71.6 (L) 09/16/2021 12:00 PM    Z4JHOVLIHWQ 18.1 (L) 09/16/2021 12:00 PM   P  Lab Results   Component Value Date/Time    ANTIDNADS 10 09/16/2021 12:00 PM    SMITHAB 0 04/02/2015 09:07 AM    RNPAB 2 04/02/2015 09:07 AM    ANTISSASJ 6 04/02/2015 09:07 AM    ANTISSBSJ 1 04/02/2015 09:07 AM     Lab Results   Component Value Date/Time    ANCAIGG <1:20 09/08/2016 04:03 PM     Lab Results   Component Value Date/Time    IGACARDIOLI 1 09/08/2016 04:03 PM    IGMCARDIOLI 0 09/08/2016 04:03 PM    IGGCARDIOLI 7 09/08/2016 04:03 PM    RUSSELVIPER 33.8 04/16/2006 04:01 PM    PROTHROMBTM 13.4 09/16/2021 12:00 PM    INR 1.05 09/16/2021 12:00 PM     Lab Results   Component Value Date/Time    ANTIMITOCHO 2.1 04/02/2015 09:07 AM    FACTIN 12 04/02/2015 09:07 AM     Lab Results   Component Value Date/Time    TSHULTRASEN 0.874 01/22/2021 12:54 PM     Lab Results   Component Value Date/Time    HEPBSAG Negative 02/18/2020 04:24 PM    HEPBCORIGM  Negative 02/18/2020 04:24 PM    HEPCAB Negative 02/18/2020 04:24 PM     Lab Results   Component Value Date/Time    QNTTBGOLD Negative 01/31/2016 02:35 AM     Lab Results   Component Value Date/Time    ASTSGOT 24 12/15/2021 02:09 PM    ALTSGPT 34 12/15/2021 02:09 PM    ALKPHOSPHAT 89 12/15/2021 02:09 PM    TBILIRUBIN 0.3 12/15/2021 02:09 PM    TOTPROTEIN 7.3 12/15/2021 02:09 PM    ALBUMIN 4.3 12/15/2021 02:09 PM    ALBUMIN 1.0 01/30/2016 03:00 PM     Lab Results   Component Value Date/Time    SODIUM 138 12/15/2021 02:09 PM    POTASSIUM 4.1 12/15/2021 02:09 PM    CHLORIDE 94 (L) 12/15/2021 02:09 PM    CO2 30 12/15/2021 02:09 PM    GLUCOSE 80 12/15/2021 02:09 PM    BUN 18 12/15/2021 02:09 PM    CREATININE 4.73 (H) 12/15/2021 02:09 PM    CREATININE 3.0 (H) 10/14/2008 09:29 AM    CALCIUM 9.3 12/15/2021 02:09 PM    MAGNESIUM 2.3 09/16/2021 12:00 PM     Lab Results   Component Value Date/Time    WBC 3.0 (L) 12/15/2021 02:09 PM    RBC 3.91 (L) 12/15/2021 02:09 PM    HEMOGLOBIN 11.8 (L) 12/15/2021 02:09 PM    HEMATOCRIT 35.2 (L) 12/15/2021 02:09 PM    MCV 90.0 12/15/2021 02:09 PM    MCH 30.2 12/15/2021 02:09 PM    MCHC 33.5 (L) 12/15/2021 02:09 PM    RDW 38.8 12/15/2021 02:09 PM    PLATELETCT 134 (L) 12/15/2021 02:09 PM    MPV 10.7 12/15/2021 02:09 PM    NEUTS 1.94 (L) 12/15/2021 02:09 PM    POLYS 1 08/23/2015 09:10 AM    LYMPHOCYTES 17.20 (L) 12/15/2021 02:09 PM    MONOCYTES 11.90 12/15/2021 02:09 PM    EOSINOPHILS 5.30 12/15/2021 02:09 PM    EOSINOPHILS 1 01/30/2016 03:00 PM    BASOPHILS 1.30 12/15/2021 02:09 PM    HYPOCHROMIA 1+ 10/14/2008 09:29 AM    ANISOCYTOSIS 1+ 02/18/2016 09:50 PM     Lab Results   Component Value Date/Time    FERRITIN 199.2 04/02/2015 09:07 AM    IRON 138 02/19/2020 04:24 AM    FOLATE 22.2 02/19/2020 04:24 AM     Lab Results   Component Value Date/Time    25HYDROXY 20 (L) 01/20/2015 02:07 AM    PTHINTACT 320.1 (H) 01/20/2015 02:07 AM     Lab Results   Component Value Date/Time    COLORURINE Lt.  Yellow 01/14/2015 12:50 AM    SPECGRAVITY 1.009 01/14/2015 12:50 AM    PHURINE 6.0 01/14/2015 12:50 AM    GLUCOSEUR 100 (A) 01/14/2015 12:50 AM    KETONES Negative 01/14/2015 12:50 AM    PROTEINURIN 200 (A) 01/14/2015 12:50 AM     Lab Results   Component Value Date/Time    TOTALVOLUME 2650 ml 05/05/2008 07:30 AM    TOTALVOLUME pend 08/11/2007 04:00 AM    TOTPROTUR 53.0 (H) 05/05/2008 07:30 AM    FTCYNYXM62 1404.5 (H) 05/05/2008 07:30 AM    CREATININEU 39 08/08/2007 12:15 PM     Lab Results   Component Value Date/Time    FLTYPE Ascites 01/30/2016 03:00 PM    FLTYPE Ascites 01/30/2016 03:00 PM     Lab Results   Component Value Date/Time    CHOLSTRLTOT 155 09/17/2021 02:36 AM    LDL 79 09/17/2021 02:36 AM    HDL 65 09/17/2021 02:36 AM    TRIGLYCERIDE 54 09/17/2021 02:36 AM    HBA1C 5.1 07/08/2022 04:38 PM    HBA1C 4.8 09/16/2021 05:20 PM       RADIOLOGY RESULTS REVIEWED AND INTERPRETED BY ME:  Results for orders placed during the hospital encounter of 05/07/08    US-RENAL    Impression  IMPRESSION:    FINDINGS CONSISTENT WITH MEDICAL RENAL DISEASE.  NO HYDRONEPHROSIS.  RESOLUTION OF RIGHT PERINEPHRIC FLUID SEEN ON PRIOR EXAM.      All relevant laboratory and imaging results reported on this note were reviewed and interpreted by me.      Assessment & Plan        Mago Guy is a 42 y.o. female with history as noted above whose presentation merits the following diagnostic and clinical status impressions and recommendations:    1. Stage IV lupus nephritis (WHO) (HCC)  Having caused ESRD, her SLE appears to be in remission with no clinical evidence of evolving or impending flare of systemic manifestations. That said, given the potential for discordance between immunologic activity and clinical disease manifestations, need to ascertain her immunologic profile with laboratory markers of disease activity for complete assessment.  - GIORGIO REFLEXIVE PROFILE; Future  - COMPLEMENT C3; Future  - COMPLEMENT C4;  Future  - Sed Rate; Future  - CRP QUANTITIVE (NON-CARDIAC); Future  - Consider hydroxychloroquine, but reasonable to await the above results before moving forward    2. ESRD on hemodialysis (HCC)  Presumably secondary to lupus nephritis which appears to have been the most significant manifestation of her SLE.  - Lupus disease activity testing as noted above  - Presently with no contraindication to proceeding with kidney transplant      FOLLOW-UP: Return in about 3 months (around 1/17/2023) for Short.           Thank you for the opportunity to participate in the care of Mago Guy.    Bart Gross MD, MS  Rheumatologist, [facility] Rheumatology ? [facility]   of Clinical Medicine, Department of Internal Medicine  Replaced by [facility] ? [facility]

## 2022-10-20 ENCOUNTER — HOSPITAL ENCOUNTER (OUTPATIENT)
Dept: LAB | Facility: MEDICAL CENTER | Age: 42
End: 2022-10-20
Attending: STUDENT IN AN ORGANIZED HEALTH CARE EDUCATION/TRAINING PROGRAM
Payer: COMMERCIAL

## 2022-10-20 ENCOUNTER — HOSPITAL ENCOUNTER (OUTPATIENT)
Dept: LAB | Facility: MEDICAL CENTER | Age: 42
End: 2022-10-20
Attending: FAMILY MEDICINE
Payer: MEDICAID

## 2022-10-20 DIAGNOSIS — M32.14 STAGE IV LUPUS NEPHRITIS (WHO) (HCC): ICD-10-CM

## 2022-10-20 LAB
ABO GROUP BLD: NORMAL
C3 SERPL-MCNC: 80.2 MG/DL (ref 87–200)
C4 SERPL-MCNC: 24 MG/DL (ref 19–52)
CRP SERPL HS-MCNC: <0.3 MG/DL (ref 0–0.75)
ERYTHROCYTE [SEDIMENTATION RATE] IN BLOOD BY WESTERGREN METHOD: 3 MM/HOUR (ref 0–25)
RH BLD: NORMAL

## 2022-10-20 PROCEDURE — 86900 BLOOD TYPING SEROLOGIC ABO: CPT

## 2022-10-20 PROCEDURE — 83516 IMMUNOASSAY NONANTIBODY: CPT | Mod: 91

## 2022-10-20 PROCEDURE — 86038 ANTINUCLEAR ANTIBODIES: CPT

## 2022-10-20 PROCEDURE — 85652 RBC SED RATE AUTOMATED: CPT

## 2022-10-20 PROCEDURE — 86235 NUCLEAR ANTIGEN ANTIBODY: CPT

## 2022-10-20 PROCEDURE — 86225 DNA ANTIBODY NATIVE: CPT

## 2022-10-20 PROCEDURE — 86039 ANTINUCLEAR ANTIBODIES (ANA): CPT

## 2022-10-20 PROCEDURE — 36415 COLL VENOUS BLD VENIPUNCTURE: CPT

## 2022-10-20 PROCEDURE — 86160 COMPLEMENT ANTIGEN: CPT

## 2022-10-20 PROCEDURE — 86901 BLOOD TYPING SEROLOGIC RH(D): CPT

## 2022-10-20 PROCEDURE — 86140 C-REACTIVE PROTEIN: CPT

## 2022-10-20 PROCEDURE — 86256 FLUORESCENT ANTIBODY TITER: CPT

## 2022-10-22 LAB
CENTROMERE IGG TITR SER IF: 36 AU/ML (ref 0–40)
DSDNA AB TITR SER CLIF: 16 IU (ref 0–24)
ENA JO1 AB TITR SER: 0 AU/ML (ref 0–40)
ENA SCL70 IGG SER QL: 0 AU/ML (ref 0–40)
ENA SM IGG SER-ACNC: 3 AU/ML (ref 0–40)
ENA SS-B IGG SER IA-ACNC: 0 AU/ML (ref 0–40)
SSA52 R0ENA AB IGG Q0420: 0 AU/ML (ref 0–40)
SSA60 R0ENA AB IGG Q0419: 1 AU/ML (ref 0–40)
U1 SNRNP IGG SER QL: 11 UNITS (ref 0–19)

## 2022-10-23 LAB
CHROMATIN IGG SERPL-ACNC: 22 UNITS (ref 0–19)
NUCLEAR IGG SER QL IA: DETECTED

## 2022-10-24 LAB
ANA INTERPRETIVE COMMENT Q5143: ABNORMAL
ANA PATTERN Q5144: ABNORMAL
ANA TITER Q5145: ABNORMAL
ANTINUCLEAR ANTIBODY (ANA), HEP-2, IGG Q5142: DETECTED
DSDNA AB TITR SER CLIF: 31 IU (ref 0–24)

## 2022-10-25 LAB
DSDNA IGG TITR SER CLIF: NORMAL {TITER}
ENA JO1 AB TITR SER: 0 AU/ML (ref 0–40)
ENA SCL70 IGG SER QL: 0 AU/ML (ref 0–40)
ENA SM IGG SER-ACNC: 3 AU/ML (ref 0–40)
ENA SS-B IGG SER IA-ACNC: 0 AU/ML (ref 0–40)
SSA52 R0ENA AB IGG Q0420: 0 AU/ML (ref 0–40)
SSA60 R0ENA AB IGG Q0419: 1 AU/ML (ref 0–40)
U1 SNRNP IGG SER QL: 10 UNITS (ref 0–19)

## 2022-10-31 ENCOUNTER — TELEPHONE (OUTPATIENT)
Dept: RHEUMATOLOGY | Facility: MEDICAL CENTER | Age: 42
End: 2022-10-31
Payer: COMMERCIAL

## 2022-10-31 NOTE — TELEPHONE ENCOUNTER
----- Message from Bart Gross M.D. sent at 10/28/2022 11:28 PM PDT -----  Regarding: Notification for patient  Jenny,    This patient is not active on MyChart. Kindly let her know that her labs show that her lupus is quiet and should not preclude her from proceeding with kidney transplant. My consultation note was previously sent to her nephrologist who is copied here.    Bart Gross M.D.

## 2023-02-13 RX ORDER — CALCITRIOL 0.25 UG/1
CAPSULE, LIQUID FILLED ORAL
Qty: 30 CAPSULE | Refills: 0 | Status: SHIPPED | OUTPATIENT
Start: 2023-02-13

## 2023-04-28 ENCOUNTER — APPOINTMENT (OUTPATIENT)
Dept: RADIOLOGY | Facility: MEDICAL CENTER | Age: 43
End: 2023-04-28
Attending: EMERGENCY MEDICINE
Payer: MEDICAID

## 2023-04-28 ENCOUNTER — HOSPITAL ENCOUNTER (EMERGENCY)
Facility: MEDICAL CENTER | Age: 43
End: 2023-04-28

## 2023-04-28 ENCOUNTER — HOSPITAL ENCOUNTER (EMERGENCY)
Facility: MEDICAL CENTER | Age: 43
End: 2023-04-28
Attending: EMERGENCY MEDICINE
Payer: MEDICAID

## 2023-04-28 VITALS
TEMPERATURE: 97.2 F | DIASTOLIC BLOOD PRESSURE: 86 MMHG | HEIGHT: 67 IN | OXYGEN SATURATION: 92 % | BODY MASS INDEX: 20.88 KG/M2 | WEIGHT: 133 LBS | RESPIRATION RATE: 15 BRPM | HEART RATE: 76 BPM | SYSTOLIC BLOOD PRESSURE: 121 MMHG

## 2023-04-28 DIAGNOSIS — R07.9 CHEST PAIN, UNSPECIFIED TYPE: ICD-10-CM

## 2023-04-28 LAB
ALBUMIN SERPL BCP-MCNC: 4.6 G/DL (ref 3.2–4.9)
ALBUMIN/GLOB SERPL: 1.3 G/DL
ALP SERPL-CCNC: 126 U/L (ref 30–99)
ALT SERPL-CCNC: 15 U/L (ref 2–50)
ANION GAP SERPL CALC-SCNC: 10 MMOL/L (ref 7–16)
AST SERPL-CCNC: 15 U/L (ref 12–45)
BASOPHILS # BLD AUTO: 0.6 % (ref 0–1.8)
BASOPHILS # BLD: 0.02 K/UL (ref 0–0.12)
BILIRUB SERPL-MCNC: 0.5 MG/DL (ref 0.1–1.5)
BUN SERPL-MCNC: 21 MG/DL (ref 8–22)
CALCIUM ALBUM COR SERPL-MCNC: 8.8 MG/DL (ref 8.5–10.5)
CALCIUM SERPL-MCNC: 9.3 MG/DL (ref 8.4–10.2)
CHLORIDE SERPL-SCNC: 90 MMOL/L (ref 96–112)
CO2 SERPL-SCNC: 33 MMOL/L (ref 20–33)
CREAT SERPL-MCNC: 4.6 MG/DL (ref 0.5–1.4)
EKG IMPRESSION: NORMAL
EOSINOPHIL # BLD AUTO: 0.12 K/UL (ref 0–0.51)
EOSINOPHIL NFR BLD: 3.5 % (ref 0–6.9)
ERYTHROCYTE [DISTWIDTH] IN BLOOD BY AUTOMATED COUNT: 42.9 FL (ref 35.9–50)
GFR SERPLBLD CREATININE-BSD FMLA CKD-EPI: 12 ML/MIN/1.73 M 2
GLOBULIN SER CALC-MCNC: 3.6 G/DL (ref 1.9–3.5)
GLUCOSE SERPL-MCNC: 82 MG/DL (ref 65–99)
HCG SERPL QL: NEGATIVE
HCT VFR BLD AUTO: 48.9 % (ref 37–47)
HGB BLD-MCNC: 16.3 G/DL (ref 12–16)
IMM GRANULOCYTES # BLD AUTO: 0.01 K/UL (ref 0–0.11)
IMM GRANULOCYTES NFR BLD AUTO: 0.3 % (ref 0–0.9)
LYMPHOCYTES # BLD AUTO: 0.55 K/UL (ref 1–4.8)
LYMPHOCYTES NFR BLD: 16 % (ref 22–41)
MCH RBC QN AUTO: 28.4 PG (ref 27–33)
MCHC RBC AUTO-ENTMCNC: 33.3 G/DL (ref 33.6–35)
MCV RBC AUTO: 85.3 FL (ref 81.4–97.8)
MONOCYTES # BLD AUTO: 0.4 K/UL (ref 0–0.85)
MONOCYTES NFR BLD AUTO: 11.6 % (ref 0–13.4)
NEUTROPHILS # BLD AUTO: 2.34 K/UL (ref 2–7.15)
NEUTROPHILS NFR BLD: 68 % (ref 44–72)
NRBC # BLD AUTO: 0 K/UL
NRBC BLD-RTO: 0 /100 WBC
PLATELET # BLD AUTO: 179 K/UL (ref 164–446)
PMV BLD AUTO: 10.2 FL (ref 9–12.9)
POTASSIUM SERPL-SCNC: 3.8 MMOL/L (ref 3.6–5.5)
PROT SERPL-MCNC: 8.2 G/DL (ref 6–8.2)
RBC # BLD AUTO: 5.73 M/UL (ref 4.2–5.4)
SODIUM SERPL-SCNC: 133 MMOL/L (ref 135–145)
TROPONIN T SERPL-MCNC: 26 NG/L (ref 6–19)
TROPONIN T SERPL-MCNC: 27 NG/L (ref 6–19)
WBC # BLD AUTO: 3.4 K/UL (ref 4.8–10.8)

## 2023-04-28 PROCEDURE — 99285 EMERGENCY DEPT VISIT HI MDM: CPT

## 2023-04-28 PROCEDURE — 85025 COMPLETE CBC W/AUTO DIFF WBC: CPT

## 2023-04-28 PROCEDURE — 80053 COMPREHEN METABOLIC PANEL: CPT

## 2023-04-28 PROCEDURE — 70450 CT HEAD/BRAIN W/O DYE: CPT

## 2023-04-28 PROCEDURE — 84484 ASSAY OF TROPONIN QUANT: CPT

## 2023-04-28 PROCEDURE — 36415 COLL VENOUS BLD VENIPUNCTURE: CPT

## 2023-04-28 PROCEDURE — 93005 ELECTROCARDIOGRAM TRACING: CPT | Performed by: EMERGENCY MEDICINE

## 2023-04-28 PROCEDURE — 84703 CHORIONIC GONADOTROPIN ASSAY: CPT

## 2023-04-28 PROCEDURE — 71045 X-RAY EXAM CHEST 1 VIEW: CPT

## 2023-04-28 RX ORDER — CALCIUM CARBONATE 500 MG/1
500 TABLET, CHEWABLE ORAL 3 TIMES DAILY PRN
COMMUNITY

## 2023-04-28 ASSESSMENT — FIBROSIS 4 INDEX: FIB4 SCORE: 1.23

## 2023-04-28 NOTE — DISCHARGE INSTRUCTIONS
Return for recurrent chest pain shortness of breath or other concerns.  Please follow-up with cardiology.

## 2023-04-28 NOTE — ED PROVIDER NOTES
"  ER Provider Note    Scribed for Evan Mcwilliams M.d. by Galen Lunsford. 2023  1:50 PM    Primary Care Provider: ADA Triplett    CHIEF COMPLAINT  Chief Complaint   Patient presents with    Chest Pain     Pt BIB EMS from dialysis after c/o of some CP and chest tightness. She states she has been experiencing this feeling over the last few weeks intermittently. Has also been experiencing headaches and severe fatigue. No SOB.      They removed 2.1 L at dialysis today.       EXTERNAL RECORDS REVIEWED  Outpatient Notes ER visit from 2021    HPI/ROS  LIMITATION TO HISTORY   Select: Language Armenian,  Used     OUTSIDE HISTORIAN(S):  None    Mago Guy is a 42 y.o. female who presents to the ED via EMS for evaluation of intermittent chest pain, onset approximately two weeks ago. She states that she is not currently experiencing chest pain. She also reports associated symptoms of intermittent headaches, confusion, abdominal pain, and generalized cramping pain. She denies any fevers. There are no known alleviating or exacerbating factors.  She is a dialysis patient and states this is due to kidney issues, but is unsure the exact diagnosis. She denies any history of diabetes or blood clots, but she has a history of hypertension. She also reports no recent travel or surgeries. She has no family history of heart issues.     PAST MEDICAL HISTORY  Past Medical History:   Diagnosis Date    Dialysis patient ([facility])     EKG abnormality     borderline prolonged QTC.    ESRD (end stage renal disease) on dialysis ([facility])     Hypertension     Lupus nephritis, ISN/RPS class IV ([facility])        SURGICAL HISTORY  Past Surgical History:   Procedure Laterality Date    OTHER CARDIAC SURGERY      \"liquid drained from heart\", per pt    PRIMARY C SECTION       (of some type)       FAMILY HISTORY  Family History   Problem Relation Age of Onset    Kidney Disease Neg Hx        SOCIAL " "HISTORY   reports that she has never smoked. She has never used smokeless tobacco. She reports that she does not drink alcohol and does not use drugs.    CURRENT MEDICATIONS  Previous Medications    ACETAMINOPHEN (TYLENOL PO)    Take 1 Tablet by mouth 2 times a day as needed (For pain). Pt is not sure the strength    CALCITRIOL (ROCALTROL) 0.25 MCG CAP    Take 1 capsule by mouth once daily    CALCIUM PO    Take 2 Tablets by mouth 3 times a day with meals.    MULTIPLE VITAMINS-MINERALS (ONE-A-DAY WOMENS PO)    Take 1 Tablet by mouth every day.    NUTRITIONAL SUPPLEMENTS (NOVASOURCE RENAL PO)    Take 1 Capsule by mouth every day.       ALLERGIES  Labetalol, Iodine, Morphine, and Pork allergy    PHYSICAL EXAM  BP (!) 138/99   Pulse 83   Temp 36.7 °C (98 °F) (Temporal)   Resp 19   Ht 1.702 m (5' 7\")   Wt 60.3 kg (133 lb)   SpO2 98%   BMI 20.83 kg/m²   Constitutional: Well developed, Well nourished, No acute distress, Non-toxic appearance.   HENT: Normocephalic, Atraumatic, mucous membranes moist, no erythema, exudates, swelling, or masses, nares patent  Eyes: nonicteric  Neck: Supple, no meningismus  Lymphatic: No lymphadenopathy noted.   Cardiovascular: Regular rate and rhythm, no gallops rubs or murmurs  Lungs: Clear bilaterally   Abdomen: Soft and nontender throughout  Skin: Warm, Dry, no rash  Genitalia: Deferred  Rectal: Deferred  Extremities: No edema  Neurologic: Alert, appropriate, follows commands, moving all extremities, normal speech   Psychiatric: Affect normal     DIAGNOSTIC STUDIES    Labs:   Results for orders placed or performed during the hospital encounter of 04/28/23   CBC with Differential   Result Value Ref Range    WBC 3.4 (L) 4.8 - 10.8 K/uL    RBC 5.73 (H) 4.20 - 5.40 M/uL    Hemoglobin 16.3 (H) 12.0 - 16.0 g/dL    Hematocrit 48.9 (H) 37.0 - 47.0 %    MCV 85.3 81.4 - 97.8 fL    MCH 28.4 27.0 - 33.0 pg    MCHC 33.3 (L) 33.6 - 35.0 g/dL    RDW 42.9 35.9 - 50.0 fL    Platelet Count 179 164 - " 446 K/uL    MPV 10.2 9.0 - 12.9 fL    Neutrophils-Polys 68.00 44.00 - 72.00 %    Lymphocytes 16.00 (L) 22.00 - 41.00 %    Monocytes 11.60 0.00 - 13.40 %    Eosinophils 3.50 0.00 - 6.90 %    Basophils 0.60 0.00 - 1.80 %    Immature Granulocytes 0.30 0.00 - 0.90 %    Nucleated RBC 0.00 /100 WBC    Neutrophils (Absolute) 2.34 2.00 - 7.15 K/uL    Lymphs (Absolute) 0.55 (L) 1.00 - 4.80 K/uL    Monos (Absolute) 0.40 0.00 - 0.85 K/uL    Eos (Absolute) 0.12 0.00 - 0.51 K/uL    Baso (Absolute) 0.02 0.00 - 0.12 K/uL    Immature Granulocytes (abs) 0.01 0.00 - 0.11 K/uL    NRBC (Absolute) 0.00 K/uL   Complete Metabolic Panel (CMP)   Result Value Ref Range    Sodium 133 (L) 135 - 145 mmol/L    Potassium 3.8 3.6 - 5.5 mmol/L    Chloride 90 (L) 96 - 112 mmol/L    Co2 33 20 - 33 mmol/L    Anion Gap 10.0 7.0 - 16.0    Glucose 82 65 - 99 mg/dL    Bun 21 8 - 22 mg/dL    Creatinine 4.60 (HH) 0.50 - 1.40 mg/dL    Calcium 9.3 8.4 - 10.2 mg/dL    AST(SGOT) 15 12 - 45 U/L    ALT(SGPT) 15 2 - 50 U/L    Alkaline Phosphatase 126 (H) 30 - 99 U/L    Total Bilirubin 0.5 0.1 - 1.5 mg/dL    Albumin 4.6 3.2 - 4.9 g/dL    Total Protein 8.2 6.0 - 8.2 g/dL    Globulin 3.6 (H) 1.9 - 3.5 g/dL    A-G Ratio 1.3 g/dL   Troponins NOW   Result Value Ref Range    Troponin T 26 (H) 6 - 19 ng/L   Troponins in two (2) hours   Result Value Ref Range    Troponin T 27 (H) 6 - 19 ng/L   HCG Qual Serum   Result Value Ref Range    Beta-Hcg Qualitative Serum Negative Negative   CORRECTED CALCIUM   Result Value Ref Range    Correct Calcium 8.8 8.5 - 10.5 mg/dL   ESTIMATED GFR   Result Value Ref Range    GFR (CKD-EPI) 12 (A) >60 mL/min/1.73 m 2   EKG   Result Value Ref Range    Report       [facility] Emergency Dept.    Test Date:  2023  Pt Name:    MARGARITA DEWEY            Department: [facility]  MRN:        [id number]                      [facility]  Gender:     Female                       Technician: KATELYN  :        1980                    Requested By:NICOLE IZAGUIRRE  Order #:    [id number]                    Reading MD:    Measurements  Intervals                                Axis  Rate:       77                           P:          72  GA:         165                          QRS:        48  QRSD:       79                           T:          110  QT:         416  QTc:        471    Interpretive Statements  Sinus rhythm  Probable left atrial enlargement  Probable left ventricular hypertrophy  Abnormal T, consider ischemia, lateral leads  Compared to ECG 12/15/2021 14:17:43  T-wave abnormality now present  Possible ischemia now present          EKG:   I have independently interpreted this EKG  Obtained at 15:39  Normal Sinus rhythm   Rate 77  Axis normal   Intervals normal   No ST segment elevation or depression.   T wave inversions laterally, however, these are not new and are more prominent on prior EKGs.      Radiology:   The attending emergency physician has independently interpreted the diagnostic imaging associated with this visit and am waiting the final reading from the radiologist.   Preliminary interpretation is a follows: No acute disease  Radiologist interpretation:     DX-CHEST-PORTABLE (1 VIEW)   Final Result      No radiographic evidence of acute cardiopulmonary process.      CT-HEAD W/O   Final Result      No evidence of acute intracranial process.              COURSE & MEDICAL DECISION MAKING     ED Observation Status? Yes; I am placing the patient in to an observation status due to a diagnostic uncertainty as well as therapeutic intensity. Patient placed in observation status at 1:55 PM, 4/28/2023.     Observation plan is as follows: Lab work and imaging as detailed below.     Upon Reevaluation, the patient's condition has: Improved; and will be discharged.    Patient discharged from ED Observation status at 4:21 PM, 4/28/2023.    INITIAL ASSESSMENT, COURSE AND PLAN  Care Narrative: This is a 42-year-old female with  end-stage renal disease, likely related to lupus nephritis who also has a history of hypertension.  She presents with stuttering chest discomfort over the last couple of weeks, nonexertional.  Her EKG is unchanged from prior EKG and her troponins are not elevating, borderline in the mid 20s but not significantly elevating.  She likely has a troponin leak from her dialysis status.  She is chest pain-free at this time.  Heart score is 2-1 for risk factors and 1 for EKG.  The patient will be referred urgently to cardiology.  At this time I have a low suspicion for PE.  She does not have any recent travel surgery leg swelling or history of DVT or PE.  She is not tachypneic or tachycardic.  I do not suspect ACS.    1:51 PM - Patient seen and examined at bedside. Discussed plan of care, including lab work, imaging, and reassessment. Patient agrees to the plan of care. Ordered for EKG, HCG qual serum, Troponin now and every two hours, CMP, CBC with differential, CT-Head without, and DX-Chest to evaluate her symptoms.        ADDITIONAL PROBLEM LIST      DISPOSITION AND DISCUSSIONS  I have discussed management of the patient with the following physicians and TAYE's:  None    Discussion of management with other QHP or appropriate source(s): None     Escalation of care considered, and ultimately not performed: acute inpatient care management, however at this time, the patient is most appropriate for outpatient management.    Barriers to care at this time, including but not limited to:  None known .     The patient will return for new or worsening symptoms and is stable at the time of discharge.    DISPOSITION:  Patient will be discharged home in stable condition.    FOLLOW UP:  Minerva Da Silva, [email or web address]  [address] [place] [id number] [phone]    Schedule an appointment as soon as possible for a visit today      David Salmon M.D.  [address]  [address]  [place] [id number] [phone]    Schedule an  appointment as soon as possible for a visit today        OUTPATIENT MEDICATIONS:  New Prescriptions    No medications on file        FINAL DIAGNOSIS  1. Chest pain, unspecified type         I, Galen Lunsford (Scribe), am scribing for, and in the presence of, Evan Mcwilliams M.D..    Electronically signed by: Galen Lunsford (Scribe), 4/28/2023    IEvan M.D. personally performed the services described in this documentation, as scribed by Galen Lunsford in my presence, and it is both accurate and complete.     The note accurately reflects work and decisions made by me.  Evan Mcwilliams M.D.  4/28/2023  4:23 PM

## 2023-04-28 NOTE — ED NOTES
Pt rounded on. Reports no pain at this time. Pt educated on using call light and future lab draw that will be done at 1600.

## 2023-05-06 ENCOUNTER — HOSPITAL ENCOUNTER (EMERGENCY)
Facility: MEDICAL CENTER | Age: 43
End: 2023-05-06
Attending: EMERGENCY MEDICINE
Payer: MEDICAID

## 2023-05-06 ENCOUNTER — APPOINTMENT (OUTPATIENT)
Dept: RADIOLOGY | Facility: MEDICAL CENTER | Age: 43
End: 2023-05-06
Attending: EMERGENCY MEDICINE
Payer: MEDICAID

## 2023-05-06 VITALS
HEIGHT: 67 IN | OXYGEN SATURATION: 96 % | RESPIRATION RATE: 16 BRPM | BODY MASS INDEX: 21.45 KG/M2 | HEART RATE: 87 BPM | DIASTOLIC BLOOD PRESSURE: 78 MMHG | TEMPERATURE: 97.8 F | SYSTOLIC BLOOD PRESSURE: 125 MMHG | WEIGHT: 136.69 LBS

## 2023-05-06 DIAGNOSIS — S00.83XA CONTUSION OF FACE, INITIAL ENCOUNTER: ICD-10-CM
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
## 2023-05-06 PROCEDURE — 70450 CT HEAD/BRAIN W/O DYE: CPT

## 2023-05-06 PROCEDURE — 99283 EMERGENCY DEPT VISIT LOW MDM: CPT

## 2023-05-06 PROCEDURE — 70486 CT MAXILLOFACIAL W/O DYE: CPT

## 2023-05-06 ASSESSMENT — FIBROSIS 4 INDEX
FIB4 SCORE: 0.87
FIB4 SCORE: 0.87
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
## 2023-05-07 NOTE — ED NOTES
Patient discharged home per ERP.  Discharge teaching and education discussed with patient. POC discussed.   Patient verbalized understanding of discharge teaching and education. No other questions at this time.     Family at bedside assisting with Lithuanian interpretation - declining virtual  at this time.      VSS. Patient alert and oriented. Patient arranged ride for self. Able to ambulate off unit safely with steady gait.
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
## 2023-05-09 ENCOUNTER — TELEPHONE (OUTPATIENT)
Dept: HEALTH INFORMATION MANAGEMENT | Facility: OTHER | Age: 43
End: 2023-05-09
Payer: MEDICAID

## 2023-12-20 ENCOUNTER — APPOINTMENT (OUTPATIENT)
Dept: RADIOLOGY | Facility: MEDICAL CENTER | Age: 43
End: 2023-12-20
Attending: STUDENT IN AN ORGANIZED HEALTH CARE EDUCATION/TRAINING PROGRAM
Payer: COMMERCIAL

## 2023-12-20 ENCOUNTER — HOSPITAL ENCOUNTER (EMERGENCY)
Facility: MEDICAL CENTER | Age: 43
End: 2023-12-21
Attending: STUDENT IN AN ORGANIZED HEALTH CARE EDUCATION/TRAINING PROGRAM
Payer: COMMERCIAL

## 2023-12-20 ENCOUNTER — APPOINTMENT (OUTPATIENT)
Dept: RADIOLOGY | Facility: MEDICAL CENTER | Age: 43
End: 2023-12-20
Payer: COMMERCIAL

## 2023-12-20 DIAGNOSIS — R07.9 CHEST PAIN, UNSPECIFIED TYPE: ICD-10-CM

## 2023-12-20 DIAGNOSIS — R05.1 ACUTE COUGH: ICD-10-CM

## 2023-12-20 LAB
ALBUMIN SERPL BCP-MCNC: 4.8 G/DL (ref 3.2–4.9)
ALBUMIN/GLOB SERPL: 1.2 G/DL
ALP SERPL-CCNC: 81 U/L (ref 30–99)
ALT SERPL-CCNC: 17 U/L (ref 2–50)
ANION GAP SERPL CALC-SCNC: 14 MMOL/L (ref 7–16)
AST SERPL-CCNC: 14 U/L (ref 12–45)
BASOPHILS # BLD AUTO: 0.5 % (ref 0–1.8)
BASOPHILS # BLD: 0.03 K/UL (ref 0–0.12)
BILIRUB SERPL-MCNC: 0.6 MG/DL (ref 0.1–1.5)
BUN SERPL-MCNC: 23 MG/DL (ref 8–22)
CALCIUM ALBUM COR SERPL-MCNC: 9.2 MG/DL (ref 8.5–10.5)
CALCIUM SERPL-MCNC: 9.8 MG/DL (ref 8.4–10.2)
CHLORIDE SERPL-SCNC: 88 MMOL/L (ref 96–112)
CO2 SERPL-SCNC: 32 MMOL/L (ref 20–33)
CREAT SERPL-MCNC: 5.57 MG/DL (ref 0.5–1.4)
EKG IMPRESSION: NORMAL
EOSINOPHIL # BLD AUTO: 0.04 K/UL (ref 0–0.51)
EOSINOPHIL NFR BLD: 0.7 % (ref 0–6.9)
ERYTHROCYTE [DISTWIDTH] IN BLOOD BY AUTOMATED COUNT: 41.9 FL (ref 35.9–50)
GFR SERPLBLD CREATININE-BSD FMLA CKD-EPI: 9 ML/MIN/1.73 M 2
GLOBULIN SER CALC-MCNC: 4 G/DL (ref 1.9–3.5)
GLUCOSE SERPL-MCNC: 112 MG/DL (ref 65–99)
HCG SERPL QL: NEGATIVE
HCT VFR BLD AUTO: 53.1 % (ref 37–47)
HGB BLD-MCNC: 17.2 G/DL (ref 12–16)
IMM GRANULOCYTES # BLD AUTO: 0.02 K/UL (ref 0–0.11)
IMM GRANULOCYTES NFR BLD AUTO: 0.3 % (ref 0–0.9)
LYMPHOCYTES # BLD AUTO: 0.33 K/UL (ref 1–4.8)
LYMPHOCYTES NFR BLD: 5.4 % (ref 22–41)
MCH RBC QN AUTO: 27.3 PG (ref 27–33)
MCHC RBC AUTO-ENTMCNC: 32.4 G/DL (ref 32.2–35.5)
MCV RBC AUTO: 84.2 FL (ref 81.4–97.8)
MONOCYTES # BLD AUTO: 0.32 K/UL (ref 0–0.85)
MONOCYTES NFR BLD AUTO: 5.2 % (ref 0–13.4)
NEUTROPHILS # BLD AUTO: 5.4 K/UL (ref 1.82–7.42)
NEUTROPHILS NFR BLD: 87.9 % (ref 44–72)
NRBC # BLD AUTO: 0 K/UL
NRBC BLD-RTO: 0 /100 WBC (ref 0–0.2)
PLATELET # BLD AUTO: 210 K/UL (ref 164–446)
PMV BLD AUTO: 10.4 FL (ref 9–12.9)
POTASSIUM SERPL-SCNC: 3.7 MMOL/L (ref 3.6–5.5)
PROT SERPL-MCNC: 8.8 G/DL (ref 6–8.2)
RBC # BLD AUTO: 6.31 M/UL (ref 4.2–5.4)
SODIUM SERPL-SCNC: 134 MMOL/L (ref 135–145)
TROPONIN T SERPL-MCNC: 30 NG/L (ref 6–19)
WBC # BLD AUTO: 6.1 K/UL (ref 4.8–10.8)

## 2023-12-20 PROCEDURE — 36415 COLL VENOUS BLD VENIPUNCTURE: CPT

## 2023-12-20 PROCEDURE — 93005 ELECTROCARDIOGRAM TRACING: CPT | Performed by: STUDENT IN AN ORGANIZED HEALTH CARE EDUCATION/TRAINING PROGRAM

## 2023-12-20 PROCEDURE — 85025 COMPLETE CBC W/AUTO DIFF WBC: CPT

## 2023-12-20 PROCEDURE — 84703 CHORIONIC GONADOTROPIN ASSAY: CPT

## 2023-12-20 PROCEDURE — 84484 ASSAY OF TROPONIN QUANT: CPT

## 2023-12-20 PROCEDURE — 71045 X-RAY EXAM CHEST 1 VIEW: CPT

## 2023-12-20 PROCEDURE — 99284 EMERGENCY DEPT VISIT MOD MDM: CPT

## 2023-12-20 PROCEDURE — 93005 ELECTROCARDIOGRAM TRACING: CPT

## 2023-12-20 PROCEDURE — 80053 COMPREHEN METABOLIC PANEL: CPT

## 2023-12-20 ASSESSMENT — PAIN DESCRIPTION - DESCRIPTORS: DESCRIPTORS: PRESSURE

## 2023-12-21 VITALS
BODY MASS INDEX: 21.28 KG/M2 | HEIGHT: 67 IN | WEIGHT: 135.58 LBS | DIASTOLIC BLOOD PRESSURE: 73 MMHG | TEMPERATURE: 98.2 F | HEART RATE: 87 BPM | SYSTOLIC BLOOD PRESSURE: 113 MMHG | RESPIRATION RATE: 18 BRPM | OXYGEN SATURATION: 96 %

## 2023-12-21 LAB — TROPONIN T SERPL-MCNC: 31 NG/L (ref 6–19)

## 2023-12-21 PROCEDURE — 84484 ASSAY OF TROPONIN QUANT: CPT

## 2023-12-21 ASSESSMENT — HEART SCORE
RISK FACTORS: 1-2 RISK FACTORS
ECG: NON-SPECIFIC REPOLARIZATION DISTURBANCE
TROPONIN: LESS THAN OR EQUAL TO NORMAL LIMIT
AGE: <45
HISTORY: SLIGHTLY SUSPICIOUS
HEART SCORE: 2

## 2023-12-21 NOTE — DISCHARGE INSTRUCTIONS
If you develop recurrent chest pain, trouble breathing, passing out, headache or vomiting you should return immediately to the emergency room.  You should follow-up with your nephrologist and primary care provider.    Si desarrolla dolor de pecho recurrente, dificultad para respirar, desmayo, dolor de haleigh o vómitos, debe regresar inmediatamente a la jimmy de emergencias. Debe realizar un seguimiento con sher nefrólogo y proveedor de atención primaria.

## 2023-12-21 NOTE — ED PROVIDER NOTES
"ED Provider Note    CHIEF COMPLAINT  Chief Complaint   Patient presents with    Chest Pain    Shortness of Breath    Sent by MD     Pt bib ems from dialysis, they took 1.8 L off pt.   Pt reports sob, chest/back tightness and cough during dialysis.     Cough       EXTERNAL RECORDS REVIEWED      HPI/ROS  LIMITATION TO HISTORY   Select: Language Ugandan,  Used   OUTSIDE HISTORIAN(S):      Mago Cam is a 43 y.o. female who presents after episode of chest pain shortness of breath.  Patient says she was at dialysis and noticed some air bubbles in the connection line.  Patient says she had a short period of cough, chest pain and shortness of breath and was disconnected from dialysis machine.  Patient says that in route to the emergency room and symptoms resolved.  Patient currently asymptomatic.  Patient denies other recent illness including fever, chills, nausea, vomiting or diarrhea.    PAST MEDICAL HISTORY   has a past medical history of Kidney injury.    SURGICAL HISTORY  patient denies any surgical history    FAMILY HISTORY  History reviewed. No pertinent family history.    SOCIAL HISTORY  Social History     Tobacco Use    Smoking status: Never    Smokeless tobacco: Never   Vaping Use    Vaping Use: Never used   Substance and Sexual Activity    Alcohol use: Not Currently    Drug use: Never    Sexual activity: Not on file       CURRENT MEDICATIONS  Home Medications       Reviewed by Alicja Villa R.N. (Registered Nurse) on 12/20/23 at 2023  Med List Status: Partial     Medication Last Dose Status        Patient Anthony Taking any Medications                           ALLERGIES  Allergies   Allergen Reactions    Betadine [Povidone Iodine] Anaphylaxis     MD stated not to use    Iodine Contrast [Diagnostic X-Ray Materials] Unspecified     \"Burning\"       PHYSICAL EXAM  VITAL SIGNS: /73   Pulse 87   Temp 36.8 °C (98.2 °F) (Temporal)   Resp 18   Ht 1.702 m (5' 7\")   Wt 61.5 kg " (135 lb 9.3 oz)   LMP 2023 (Approximate)   SpO2 96%   BMI 21.24 kg/m²    Constitutional: Alert in no apparent distress.  HENT: No signs of trauma, Bilateral external ears normal, Nose normal.   Eyes: Pupils are equal and reactive, Conjunctiva normal, Non-icteric.   Neck: Normal range of motion, No tenderness, Supple, No stridor.   Cardiovascular: Regular rate and rhythm, no murmurs.   Thorax & Lungs: Normal breath sounds, No respiratory distress, No wheezing, No chest tenderness.   Abdomen: Bowel sounds normal, Soft, No tenderness, No masses, No pulsatile masses.   Skin: Warm, Dry, No erythema, No rash.   Back: No bony tenderness, No CVA tenderness.   Extremities: Intact distal pulses, No edema, No tenderness, No cyanosis  Musculoskeletal: Good range of motion in all major joints. No tenderness to palpation or major deformities noted.   Neurologic: Alert , Normal motor function, Normal sensory function, No focal deficits noted.     DIAGNOSTIC STUDIES / PROCEDURES  EKG  I have independently interpreted this EKG  Results for orders placed or performed during the hospital encounter of 23   EKG   Result Value Ref Range    Report       [facility]    Test Date:  2023  Pt Name:    MARGARITA GENAO       Department: [facility]  MRN:        [id number]                      Room:  Gender:     Female                       Technician: [id number]  :        1980                   Requested By:ER TRIAGE PROTOCOL  Order #:    [id number]                    Reading MD: Brien Henry    Measurements  Intervals                                Axis  Rate:       84                           P:          73  NM:         153                          QRS:        57  QRSD:       81                           T:          103  QT:         396  QTc:        469    Interpretive Statements  Interpreted by me: Sinus rhythm, rate 84, normal intervals, inverted T wave  in lead I and aVL  otherwise no signs of acute ischemia, poor R wave  progression  Electronically Signed On 12- 22:17:08 PST by Brien Henry           LABS  Labs Reviewed   CBC WITH DIFFERENTIAL - Abnormal; Notable for the following components:       Result Value    RBC 6.31 (*)     Hemoglobin 17.2 (*)     Hematocrit 53.1 (*)     Neutrophils-Polys 87.90 (*)     Lymphocytes 5.40 (*)     Lymphs (Absolute) 0.33 (*)     All other components within normal limits    Narrative:     Biotin intake of greater than 5 mg per day may interfere with  troponin levels, causing false low values.   COMP METABOLIC PANEL - Abnormal; Notable for the following components:    Sodium 134 (*)     Chloride 88 (*)     Glucose 112 (*)     Bun 23 (*)     Creatinine 5.57 (*)     Total Protein 8.8 (*)     Globulin 4.0 (*)     All other components within normal limits    Narrative:     Biotin intake of greater than 5 mg per day may interfere with  troponin levels, causing false low values.   TROPONIN - Abnormal; Notable for the following components:    Troponin T 30 (*)     All other components within normal limits    Narrative:     Biotin intake of greater than 5 mg per day may interfere with  troponin levels, causing false low values.   TROPONIN - Abnormal; Notable for the following components:    Troponin T 31 (*)     All other components within normal limits    Narrative:     Biotin intake of greater than 5 mg per day may interfere with  troponin levels, causing false low values.   ESTIMATED GFR - Abnormal; Notable for the following components:    GFR (CKD-EPI) 9 (*)     All other components within normal limits    Narrative:     Biotin intake of greater than 5 mg per day may interfere with  troponin levels, causing false low values.   HCG QUAL SERUM    Narrative:     Biotin intake of greater than 5 mg per day may interfere with  troponin levels, causing false low values.         RADIOLOGY  I have independently interpreted the diagnostic imaging  associated with this visit and am waiting the final reading from the radiologist.   My preliminary interpretation is as follows: No pneumothorax  Radiologist interpretation:   DX-CHEST-PORTABLE (1 VIEW)   Final Result         1.  No acute cardiopulmonary disease.            COURSE & MEDICAL DECISION MAKING    ED Observation Status?     INITIAL ASSESSMENT, COURSE AND PLAN  Care Narrative: Differential includes ACS, PE, air embolism.  With onset of cough, chest pain, shortness of breath after patient observed air bubbles and catheter line concern for air embolism.  Patient had brief period of symptoms currently asymptomatic without vital sign abnormalities.  Chest x-ray unremarkable.  Will observe patient for signs of hypoxemia, endorgan dysfunction.  If patient develops recurrent symptoms, vital sign abnormalities will pursue further imaging of the chest to assess for venous embolism and consider admission for further observation.    Blood work notable for expected kidney dysfunction no signs of acidosis or hyperkalemia.  Initial troponin with mild elevation stable on repeat.  Patient with nonischemic ECG.  Patient with brief episode of pleuritic chest pain overall believe troponin elevation which is stable secondary to significant kidney dysfunction.  In regards to possibility of air embolism during 5-hour observation patient without dysrhythmias, hypoxemia, dyspnea continues to be asymptomatic.  Patient may have had brief or minor air embolism given her onset of pleuritic chest pain, cough and shortness of breath though during observation.  Patient without signs or symptoms of endorgan disease beyond known ESRD.  Regards to alternate causes of chest pain and dyspnea patient is low risk for ACS heart score of 2.  Patient is PERC negative.  Patient has not had any neurologic symptoms and has normal neurologic exam.  At this point believe discharge with strict return precautions is reasonable.        ADDITIONAL  PROBLEM LIST    DISPOSITION AND DISCUSSIONS    Decision tools and prescription drugs considered including, but not limited to: HEART Score of 2 .    FINAL DIAGNOSIS  1. Acute cough    2. Chest pain, unspecified type           Electronically signed by: Brien Henry D.O., 12/20/2023 10:19 PM

## 2023-12-21 NOTE — ED NOTES
Patient is stable for discharge at this time, anticipatory guidance provided, close follow-up is encouraged, and ED return instructions have been detailed. Patient agreeable to the disposition and plan and discharged home in ambulatory state and in good condition.

## 2024-01-03 DIAGNOSIS — Z99.2 ESRD (END STAGE RENAL DISEASE) ON DIALYSIS (HCC): ICD-10-CM

## 2024-01-03 DIAGNOSIS — N18.6 ESRD (END STAGE RENAL DISEASE) ON DIALYSIS (HCC): ICD-10-CM

## 2024-01-03 DIAGNOSIS — D75.1 POLYCYTHEMIA: ICD-10-CM

## 2024-02-15 ENCOUNTER — TELEPHONE (OUTPATIENT)
Dept: HEMATOLOGY ONCOLOGY | Facility: MEDICAL CENTER | Age: 44
End: 2024-02-15

## 2024-02-16 NOTE — TELEPHONE ENCOUNTER
Patient called, Welsh speaker.  PAR used the Language Line.    Patient is asking about her referral to Hematology.  PAR advised that a referral coordinator will look at this referral, and contact the patient to schedule.    The patient information was incorrect.  The wrong phone number, wrong home address.  Her name is Mago Padron.  Her 's last name is Tutu.    Address, phone number, etc updated in Epic.  Please contact her to schedule New Patient appointment.

## 2024-03-05 ENCOUNTER — HOSPITAL ENCOUNTER (OUTPATIENT)
Dept: RADIOLOGY | Facility: MEDICAL CENTER | Age: 44
End: 2024-03-05
Attending: INTERNAL MEDICINE
Payer: COMMERCIAL

## 2024-03-05 DIAGNOSIS — Z99.2 ESRD (END STAGE RENAL DISEASE) ON DIALYSIS (HCC): ICD-10-CM

## 2024-03-05 DIAGNOSIS — D75.1 POLYCYTHEMIA: ICD-10-CM

## 2024-03-05 DIAGNOSIS — N18.6 ESRD (END STAGE RENAL DISEASE) ON DIALYSIS (HCC): ICD-10-CM

## 2024-03-05 PROCEDURE — 76775 US EXAM ABDO BACK WALL LIM: CPT

## 2024-04-04 ENCOUNTER — HOSPITAL ENCOUNTER (OUTPATIENT)
Dept: LAB | Facility: MEDICAL CENTER | Age: 44
End: 2024-04-04
Attending: INTERNAL MEDICINE
Payer: MEDICAID

## 2024-04-04 LAB
BASOPHILS # BLD AUTO: 1 % (ref 0–1.8)
BASOPHILS # BLD: 0.04 K/UL (ref 0–0.12)
EOSINOPHIL # BLD AUTO: 0.07 K/UL (ref 0–0.51)
EOSINOPHIL NFR BLD: 1.8 % (ref 0–6.9)
ERYTHROCYTE [DISTWIDTH] IN BLOOD BY AUTOMATED COUNT: 42.2 FL (ref 35.9–50)
HCT VFR BLD AUTO: 47.2 % (ref 37–47)
HGB BLD-MCNC: 14.8 G/DL (ref 12–16)
HGB RETIC QN AUTO: 26.6 PG/CELL (ref 29–35)
IMM GRANULOCYTES # BLD AUTO: 0 K/UL (ref 0–0.11)
IMM GRANULOCYTES NFR BLD AUTO: 0 % (ref 0–0.9)
IMM RETICS NFR: 10.6 % (ref 2.6–16.1)
LYMPHOCYTES # BLD AUTO: 0.54 K/UL (ref 1–4.8)
LYMPHOCYTES NFR BLD: 14.1 % (ref 22–41)
MCH RBC QN AUTO: 25.6 PG (ref 27–33)
MCHC RBC AUTO-ENTMCNC: 31.4 G/DL (ref 32.2–35.5)
MCV RBC AUTO: 81.7 FL (ref 81.4–97.8)
MONOCYTES # BLD AUTO: 0.47 K/UL (ref 0–0.85)
MONOCYTES NFR BLD AUTO: 12.3 % (ref 0–13.4)
NEUTROPHILS # BLD AUTO: 2.71 K/UL (ref 1.82–7.42)
NEUTROPHILS NFR BLD: 70.8 % (ref 44–72)
NRBC # BLD AUTO: 0 K/UL
NRBC BLD-RTO: 0 /100 WBC (ref 0–0.2)
PLATELET # BLD AUTO: 211 K/UL (ref 164–446)
PMV BLD AUTO: 11.4 FL (ref 9–12.9)
RBC # BLD AUTO: 5.78 M/UL (ref 4.2–5.4)
RETICS # AUTO: 0.04 M/UL (ref 0.04–0.12)
RETICS/RBC NFR: 0.7 % (ref 0.8–2.6)
WBC # BLD AUTO: 3.8 K/UL (ref 4.8–10.8)

## 2024-04-04 PROCEDURE — 36415 COLL VENOUS BLD VENIPUNCTURE: CPT

## 2024-04-04 PROCEDURE — 85025 COMPLETE CBC W/AUTO DIFF WBC: CPT

## 2024-04-04 PROCEDURE — 80053 COMPREHEN METABOLIC PANEL: CPT

## 2024-04-04 PROCEDURE — 82668 ASSAY OF ERYTHROPOIETIN: CPT

## 2024-04-04 PROCEDURE — 85046 RETICYTE/HGB CONCENTRATE: CPT

## 2024-04-04 PROCEDURE — 81270 JAK2 GENE: CPT

## 2024-04-04 PROCEDURE — 81279 JAK2 GENE TRGT SEQUENCE ALYS: CPT

## 2024-04-04 PROCEDURE — 82728 ASSAY OF FERRITIN: CPT

## 2024-04-05 LAB
ALBUMIN SERPL BCP-MCNC: 4.5 G/DL (ref 3.2–4.9)
ALBUMIN/GLOB SERPL: 1.2 G/DL
ALP SERPL-CCNC: 97 U/L (ref 30–99)
ALT SERPL-CCNC: 13 U/L (ref 2–50)
ANION GAP SERPL CALC-SCNC: 17 MMOL/L (ref 7–16)
AST SERPL-CCNC: 15 U/L (ref 12–45)
BILIRUB SERPL-MCNC: 0.4 MG/DL (ref 0.1–1.5)
BUN SERPL-MCNC: 54 MG/DL (ref 8–22)
CALCIUM ALBUM COR SERPL-MCNC: 9.2 MG/DL (ref 8.5–10.5)
CALCIUM SERPL-MCNC: 9.6 MG/DL (ref 8.5–10.5)
CHLORIDE SERPL-SCNC: 90 MMOL/L (ref 96–112)
CO2 SERPL-SCNC: 31 MMOL/L (ref 20–33)
CREAT SERPL-MCNC: 7.54 MG/DL (ref 0.5–1.4)
FERRITIN SERPL-MCNC: 19.3 NG/ML (ref 10–291)
GFR SERPLBLD CREATININE-BSD FMLA CKD-EPI: 6 ML/MIN/1.73 M 2
GLOBULIN SER CALC-MCNC: 3.8 G/DL (ref 1.9–3.5)
GLUCOSE SERPL-MCNC: 91 MG/DL (ref 65–99)
POTASSIUM SERPL-SCNC: 4.9 MMOL/L (ref 3.6–5.5)
PROT SERPL-MCNC: 8.3 G/DL (ref 6–8.2)
SODIUM SERPL-SCNC: 138 MMOL/L (ref 135–145)

## 2024-04-06 LAB — EPO SERPL-ACNC: 28 MU/ML (ref 4–27)

## 2024-04-11 LAB — TEST NAME 95000: NORMAL

## 2024-05-22 ENCOUNTER — HOSPITAL ENCOUNTER (EMERGENCY)
Facility: MEDICAL CENTER | Age: 44
End: 2024-05-23
Payer: MEDICAID

## 2024-05-22 VITALS
TEMPERATURE: 97.8 F | DIASTOLIC BLOOD PRESSURE: 79 MMHG | HEART RATE: 78 BPM | WEIGHT: 138.23 LBS | SYSTOLIC BLOOD PRESSURE: 123 MMHG | HEIGHT: 67 IN | BODY MASS INDEX: 21.7 KG/M2 | OXYGEN SATURATION: 98 % | RESPIRATION RATE: 18 BRPM

## 2024-05-22 ASSESSMENT — PAIN DESCRIPTION - PAIN TYPE: TYPE: ACUTE PAIN

## 2024-05-22 ASSESSMENT — FIBROSIS 4 INDEX: FIB4 SCORE: 0.81

## 2024-05-23 NOTE — ED TRIAGE NOTES
"43 y.o.  Female    Chief Complaint   Patient presents with    Other     Pt reports after dialysis today she feel dizziness and having right ear problem.   Pt having symptoms of headache.        Pt ambulatory to triage with above complaint  Pt states she was worried having this symptoms after dialysis at 3pm today,  both ears were affected now before was only her left ear. Pt started to have headache and intermittent blurring of vision.   Pt denies trauma (-) LOC.    Pt A&Ox4, GCS 15.     Pt to ER lobby . Pt educated on alerting staff in changes to condition. Pt verbalized understanding.   /79   Pulse 78   Temp 36.6 °C (97.8 °F) (Temporal)   Resp 18   Ht 1.702 m (5' 7\")   Wt 62.7 kg (138 lb 3.7 oz)   LMP 05/05/2024 (Approximate)   SpO2 98%   BMI 21.65 kg/m²     "

## 2024-07-04 ENCOUNTER — HOSPITAL ENCOUNTER (OUTPATIENT)
Dept: CARDIOLOGY | Facility: MEDICAL CENTER | Age: 44
End: 2024-07-04
Attending: NURSE PRACTITIONER
Payer: COMMERCIAL

## 2024-07-04 DIAGNOSIS — Z99.2 DEPENDENCE ON RENAL DIALYSIS (HCC): ICD-10-CM

## 2024-07-04 DIAGNOSIS — N18.6 END STAGE RENAL DISEASE (HCC): ICD-10-CM

## 2024-07-04 DIAGNOSIS — Z01.818 PREOP EXAMINATION: ICD-10-CM

## 2024-07-04 PROCEDURE — 93306 TTE W/DOPPLER COMPLETE: CPT

## 2024-07-05 LAB
LV EJECT FRACT  99904: 55
LV EJECT FRACT MOD 2C 99903: 59.95
LV EJECT FRACT MOD 4C 99902: 45.11
LV EJECT FRACT MOD BP 99901: 53.3

## 2024-07-05 PROCEDURE — 93306 TTE W/DOPPLER COMPLETE: CPT | Mod: 26 | Performed by: INTERNAL MEDICINE

## 2024-07-09 ENCOUNTER — HOSPITAL ENCOUNTER (OUTPATIENT)
Dept: RADIOLOGY | Facility: MEDICAL CENTER | Age: 44
End: 2024-07-09
Attending: NURSE PRACTITIONER
Payer: COMMERCIAL

## 2024-07-09 DIAGNOSIS — Z99.2 DEPENDENCE ON PERITONEAL DIALYSIS (HCC): ICD-10-CM

## 2024-07-09 DIAGNOSIS — Z76.82 AWAITING ORGAN TRANSPLANT STATUS: ICD-10-CM

## 2024-07-09 DIAGNOSIS — N18.6 END STAGE RENAL DISEASE (HCC): ICD-10-CM

## 2024-07-09 PROCEDURE — 74176 CT ABD & PELVIS W/O CONTRAST: CPT

## 2024-07-17 ENCOUNTER — HOSPITAL ENCOUNTER (OUTPATIENT)
Dept: RADIOLOGY | Facility: MEDICAL CENTER | Age: 44
End: 2024-07-17
Payer: COMMERCIAL

## 2024-07-22 ENCOUNTER — HOSPITAL ENCOUNTER (OUTPATIENT)
Dept: RADIOLOGY | Facility: MEDICAL CENTER | Age: 44
End: 2024-07-22
Attending: NURSE PRACTITIONER
Payer: COMMERCIAL

## 2024-07-22 DIAGNOSIS — Z12.31 SCREENING MAMMOGRAM FOR BREAST CANCER: ICD-10-CM

## 2024-07-22 PROCEDURE — 77063 BREAST TOMOSYNTHESIS BI: CPT

## 2024-07-24 ENCOUNTER — OFFICE VISIT (OUTPATIENT)
Dept: RHEUMATOLOGY | Facility: MEDICAL CENTER | Age: 44
End: 2024-07-24
Attending: STUDENT IN AN ORGANIZED HEALTH CARE EDUCATION/TRAINING PROGRAM
Payer: COMMERCIAL

## 2024-07-24 VITALS
TEMPERATURE: 97.8 F | HEART RATE: 85 BPM | HEIGHT: 67 IN | BODY MASS INDEX: 21.27 KG/M2 | WEIGHT: 135.5 LBS | DIASTOLIC BLOOD PRESSURE: 80 MMHG | SYSTOLIC BLOOD PRESSURE: 126 MMHG | OXYGEN SATURATION: 96 %

## 2024-07-24 DIAGNOSIS — M32.10 SYSTEMIC LUPUS ERYTHEMATOSUS WITH ORGAN SYSTEM INVOLVEMENT (HCC): ICD-10-CM

## 2024-07-24 DIAGNOSIS — Z99.2 ESRD ON HEMODIALYSIS (HCC): ICD-10-CM

## 2024-07-24 DIAGNOSIS — N18.6 ESRD ON HEMODIALYSIS (HCC): ICD-10-CM

## 2024-07-24 DIAGNOSIS — M32.14 STAGE IV LUPUS NEPHRITIS (WHO) (HCC): ICD-10-CM

## 2024-07-24 PROCEDURE — 3074F SYST BP LT 130 MM HG: CPT | Performed by: STUDENT IN AN ORGANIZED HEALTH CARE EDUCATION/TRAINING PROGRAM

## 2024-07-24 PROCEDURE — 99212 OFFICE O/P EST SF 10 MIN: CPT | Performed by: STUDENT IN AN ORGANIZED HEALTH CARE EDUCATION/TRAINING PROGRAM

## 2024-07-24 PROCEDURE — 99214 OFFICE O/P EST MOD 30 MIN: CPT | Performed by: STUDENT IN AN ORGANIZED HEALTH CARE EDUCATION/TRAINING PROGRAM

## 2024-07-24 PROCEDURE — 3079F DIAST BP 80-89 MM HG: CPT | Performed by: STUDENT IN AN ORGANIZED HEALTH CARE EDUCATION/TRAINING PROGRAM

## 2024-07-24 ASSESSMENT — FIBROSIS 4 INDEX: FIB4 SCORE: 0.83

## 2024-07-24 ASSESSMENT — PATIENT HEALTH QUESTIONNAIRE - PHQ9: CLINICAL INTERPRETATION OF PHQ2 SCORE: 0

## 2024-07-25 ENCOUNTER — TELEPHONE (OUTPATIENT)
Dept: RHEUMATOLOGY | Facility: MEDICAL CENTER | Age: 44
End: 2024-07-25
Payer: COMMERCIAL

## 2024-07-25 PROBLEM — M32.10 SYSTEMIC LUPUS ERYTHEMATOSUS WITH ORGAN SYSTEM INVOLVEMENT (HCC): Status: ACTIVE | Noted: 2021-09-16

## 2024-07-25 RX ORDER — CALCITRIOL 0.5 UG/1
0.5 CAPSULE, LIQUID FILLED ORAL DAILY
Qty: 30 CAPSULE | Refills: 0 | OUTPATIENT
Start: 2024-07-25

## 2024-09-06 ASSESSMENT — ENCOUNTER SYMPTOMS
PALPITATIONS: 0
SYNCOPE: 0
IRREGULAR HEARTBEAT: 0
COUGH: 0
FOCAL WEAKNESS: 0
NIGHT SWEATS: 0
VOMITING: 0
DYSPNEA ON EXERTION: 0
SHORTNESS OF BREATH: 0
WEAKNESS: 0
ORTHOPNEA: 0
WHEEZING: 0
FEVER: 0
PND: 0
DIZZINESS: 0
ABDOMINAL PAIN: 0
NAUSEA: 0
NEAR-SYNCOPE: 0
DIARRHEA: 0

## 2024-09-06 NOTE — PROGRESS NOTES
Cardiology Initial Consultation Note    Date of note:    9/9/2024    Primary Care Provider: RM Hooper  Referring Provider: Minerva Da Silva A.P.R.N.     Patient Name: Mago Scott   YOB: 1980  MRN:              [id number]    Chief Complaint: ESRD awaiting transplant    History of Present Illness: Ms. Mago Billings is a 44 y.o. female whose current medical problems include ESRD, CVA, pulmonary hypertension, and lupus who is here for cardiac consultation for ESRD awaiting transplant.     services were used in the patient's primary language of Cymro.     Name or Number: [id number]  Mode of interpretation: iPad    Content of Interpretation:    The patient presents today to establish care. The patient reports feeling today. She denies any chest pain or shortness of breath on exertion. She can easily go up 2 flights of stairs without any chest pain or shortness of breath.  She is quite active, walks daily without any problems.  She denies any orthopnea, PND, or leg swelling. No palpitations. No syncope or presyncopal episodes.     The patient reports that she was asked to undergo LHC by transplant team, and the patient reports severe allergies to contrast even with premedication.  She is asking for alternative testing.      Cardiovascular Risk Factors:  1. Smoking status: Never smoker  2. Type II Diabetes Mellitus: None  Lab Results   Component Value Date/Time    HBA1C 5.1 07/08/2022 04:38 PM    HBA1C 4.8 09/16/2021 05:20 PM    HBA1C 5.0 01/22/2021 12:54 PM     3. Hypertension: None  4. Dyslipidemia: None  Cholesterol,Tot   Date Value Ref Range Status   09/17/2021 155 100 - 199 mg/dL Final     LDL   Date Value Ref Range Status   09/17/2021 79 <100 mg/dL Final     HDL   Date Value Ref Range Status   09/17/2021 65 >=40 mg/dL Final     Triglycerides   Date Value Ref Range Status   09/17/2021 54 0 - 149 mg/dL Final     5. Family history of early Coronary  "Artery Disease in a first degree relative (Male less than 55 years of age; Female less than 65 years of age): None  6.  Obesity and/or Metabolic Syndrome: Body mass index is 22.61 kg/m².  7. Sedentary lifestyle: Active    Review of Systems   Constitutional: Negative for fever, malaise/fatigue and night sweats.   Cardiovascular:  Negative for chest pain, dyspnea on exertion, irregular heartbeat, leg swelling, near-syncope, orthopnea, palpitations, paroxysmal nocturnal dyspnea and syncope.   Respiratory:  Negative for cough, shortness of breath and wheezing.    Gastrointestinal:  Negative for abdominal pain, diarrhea, nausea and vomiting.   Neurological:  Negative for dizziness, focal weakness and weakness.         All other systems reviewed and are negative.       Current Outpatient Medications   Medication Sig Dispense Refill    calcium carbonate (TUMS) 500 MG Chew Tab Chew 500 mg 3 times a day as needed. Indications: Heartburn      VITAMIN D PO Take 1 Tablet by mouth every 48 hours.      calcitRIOL (ROCALTROL) 0.25 MCG Cap Take 1 capsule by mouth once daily (Patient taking differently: Take 0.25 mcg by mouth every day.) 30 Capsule 0    Multiple Vitamins-Minerals (ONE-A-DAY WOMENS PO) Take 1 Tablet by mouth every day.       No current facility-administered medications for this visit.         Allergies   Allergen Reactions    Labetalol Palpitations     Fast heart rate, rash, HTN    Povidone-Iodine Anaphylaxis     MD stated not to use    Iodine Rash     Rash    Morphine Itching     Pt states that she can tolerate small dose of morphine (can tolerate up to 2mg dosage).    Pork Allergy Itching     Itchy skin    Benadryl Allergy     Betadine [Povidone Iodine] Anaphylaxis     MD stated not to use    Iodine Contrast [Diagnostic X-Ray Materials] Unspecified     \"Burning\"    Shellfish-Derived Products Rash       Physical Exam:  Ambulatory Vitals  /70   Pulse 71   Resp 18   Ht 1.702 m (5' 7.01\")   Wt 65.5 kg (144 lb " 6.4 oz)   SpO2 98%    Oxygen Therapy:  Pulse Oximetry: 98 %  BP Readings from Last 4 Encounters:   09/09/24 124/70   07/24/24 126/80   05/22/24 123/79   12/21/23 113/73       Weight/BMI: Body mass index is 22.61 kg/m².  Wt Readings from Last 4 Encounters:   09/09/24 65.5 kg (144 lb 6.4 oz)   07/24/24 61.5 kg (135 lb 8 oz)   05/22/24 62.7 kg (138 lb 3.7 oz)   12/20/23 61.5 kg (135 lb 9.3 oz)       General: Well appearing and in no apparent distress  Eyes: nl conjunctiva, no icteric sclera  ENT: normal external appearance of ears, nose, and throat  Neck: no visible JVP,  no carotid bruits  Lungs: normal respiratory effort, CTAB  Heart: RRR, no murmurs, no rubs or gallops,  no edema bilateral lower extremities. No LV/RV heave on cardiac palpatation. + bilateral radial pulses.  + bilateral dp pulses.   Abdomen: soft, non tender, non distended, no masses, normal bowel sounds.  No HSM.  Extremities/MSK: no clubbing, no cyanosis  Neurological: No focal sensory deficits  Psychiatric: Appropriate affect, A/O x 3, intact judgement and insight  Skin: Warm extremities      Lab Data Review:  Lab Results   Component Value Date/Time    CHOLSTRLTOT 155 09/17/2021 02:36 AM    LDL 79 09/17/2021 02:36 AM    HDL 65 09/17/2021 02:36 AM    TRIGLYCERIDE 54 09/17/2021 02:36 AM       Lab Results   Component Value Date/Time    SODIUM 138 04/04/2024 01:51 PM    POTASSIUM 4.9 04/04/2024 01:51 PM    CHLORIDE 90 (L) 04/04/2024 01:51 PM    CO2 31 04/04/2024 01:51 PM    GLUCOSE 91 04/04/2024 01:51 PM    BUN 54 (H) 04/04/2024 01:51 PM    CREATININE 7.54 (HH) 04/04/2024 01:51 PM    CREATININE 3.0 (H) 10/14/2008 09:29 AM     Lab Results   Component Value Date/Time    ALKPHOSPHAT 97 04/04/2024 01:51 PM    ASTSGOT 15 04/04/2024 01:51 PM    ALTSGPT 13 04/04/2024 01:51 PM    TBILIRUBIN 0.4 04/04/2024 01:51 PM      Lab Results   Component Value Date/Time    WBC 3.8 (L) 04/04/2024 01:51 PM    HEMOGLOBIN 14.8 04/04/2024 01:51 PM     Lab Results   Component  Value Date/Time    HBA1C 5.1 07/08/2022 04:38 PM    HBA1C 4.8 09/16/2021 05:20 PM    HBA1C 5.0 01/22/2021 12:54 PM         Cardiac Imaging and Procedures Review:    EKG dated 9/9/2024: My personal interpretation is sinus rhythm    Echo dated 7/4/2024:   CONCLUSIONS  Compared to the prior study on 9/16/2021, no significant changes. LV   function remains preserved.     Normal left ventricular systolic function.  The left ventricular ejection fraction is visually estimated to be 55%.  Normal right ventricular size. Normal right ventricular systolic   function.    Treadmill EKG 1/25/2021  CONCLUSIONS   Negative stress ECG for ischemia.    Exercise capacity very good at >10 METS.       Assessment & Plan     1. ESRD (end stage renal disease) ([facility])  EKG    NM-CARDIAC STRESS TEST      2. Pre-transplant evaluation for ESRD (end stage renal disease)  NM-CARDIAC STRESS TEST      3. Contrast media allergy  NM-CARDIAC STRESS TEST      4. Elevated BP without diagnosis of hypertension        5. Preop cardiovascular exam  NM-CARDIAC STRESS TEST            Shared Medical Decision Making:    ESRD on dialysis  Pre-transplant evaluation  Preop cardiovascular exam  Contrast allergy  The patient reports severe allergy to contrast allergy. The patient had reactions to contrast even despite premedication for contrast previously.  The patient is active and asymptomatic without history of dyslipidemia, hypertension, or diabetes.  Additionally, EKG is normal today.   -I think it is reasonable to plan for nuclear stress test instead of left heart cath given severe contrast allergy as she is very active without any symptoms.  -I will send this note to her transplant team (Dr. Ольга Mills) to update with the discussion/decision above.    Elevated BP without diagnosis of hypertension  Pt reports home BP 120s/70s.  -Counseled the patietn to measure BP at home.  -If home BP above 130/80, will consider adding antihypertensives.       All of  the patient's excellent questions were answered to the best of my knowledge and to her satisfaction.  It was a pleasure seeing Ms. Mago Billings in my clinic today. Return in about 3 months (around 12/9/2024). Patient is aware to call the cardiology clinic with any questions or concerns.      Ezio Orta MD  [facility], [address]  [address] [place] [id number]  Phone: [phone]  Fax: [phone]

## 2024-09-09 ENCOUNTER — OFFICE VISIT (OUTPATIENT)
Dept: CARDIOLOGY | Facility: MEDICAL CENTER | Age: 44
End: 2024-09-09
Attending: STUDENT IN AN ORGANIZED HEALTH CARE EDUCATION/TRAINING PROGRAM
Payer: COMMERCIAL

## 2024-09-09 VITALS
DIASTOLIC BLOOD PRESSURE: 70 MMHG | BODY MASS INDEX: 22.66 KG/M2 | SYSTOLIC BLOOD PRESSURE: 124 MMHG | RESPIRATION RATE: 18 BRPM | OXYGEN SATURATION: 98 % | HEART RATE: 71 BPM | WEIGHT: 144.4 LBS | HEIGHT: 67 IN

## 2024-09-09 DIAGNOSIS — Z91.041 CONTRAST MEDIA ALLERGY: ICD-10-CM

## 2024-09-09 DIAGNOSIS — R03.0 ELEVATED BP WITHOUT DIAGNOSIS OF HYPERTENSION: ICD-10-CM

## 2024-09-09 DIAGNOSIS — Z01.810 PREOP CARDIOVASCULAR EXAM: ICD-10-CM

## 2024-09-09 DIAGNOSIS — N18.6 ESRD (END STAGE RENAL DISEASE) (HCC): ICD-10-CM

## 2024-09-09 DIAGNOSIS — Z01.818 PRE-TRANSPLANT EVALUATION FOR ESRD (END STAGE RENAL DISEASE): ICD-10-CM

## 2024-09-09 LAB — EKG IMPRESSION: NORMAL

## 2024-09-09 PROCEDURE — 99213 OFFICE O/P EST LOW 20 MIN: CPT | Performed by: STUDENT IN AN ORGANIZED HEALTH CARE EDUCATION/TRAINING PROGRAM

## 2024-09-09 PROCEDURE — 99204 OFFICE O/P NEW MOD 45 MIN: CPT | Performed by: STUDENT IN AN ORGANIZED HEALTH CARE EDUCATION/TRAINING PROGRAM

## 2024-09-09 PROCEDURE — 3074F SYST BP LT 130 MM HG: CPT | Performed by: STUDENT IN AN ORGANIZED HEALTH CARE EDUCATION/TRAINING PROGRAM

## 2024-09-09 PROCEDURE — 3078F DIAST BP <80 MM HG: CPT | Performed by: STUDENT IN AN ORGANIZED HEALTH CARE EDUCATION/TRAINING PROGRAM

## 2024-09-09 PROCEDURE — 99214 OFFICE O/P EST MOD 30 MIN: CPT | Performed by: STUDENT IN AN ORGANIZED HEALTH CARE EDUCATION/TRAINING PROGRAM

## 2024-09-09 PROCEDURE — 93005 ELECTROCARDIOGRAM TRACING: CPT | Performed by: STUDENT IN AN ORGANIZED HEALTH CARE EDUCATION/TRAINING PROGRAM

## 2024-09-09 PROCEDURE — 93010 ELECTROCARDIOGRAM REPORT: CPT | Performed by: STUDENT IN AN ORGANIZED HEALTH CARE EDUCATION/TRAINING PROGRAM

## 2024-09-09 ASSESSMENT — FIBROSIS 4 INDEX: FIB4 SCORE: 0.83

## 2024-09-09 NOTE — LETTER
[facility] - Operated by [facility]   [address], [address]  [place] [address]  Phone: [phone]  Fax: [phone]              Mago Guy  1980    Encounter Date: 9/9/2024    Ezio Orta M.D.          PROGRESS NOTE:      Cardiology Initial Consultation Note    Date of note:    9/9/2024    Primary Care Provider: RM Hopoer  Referring Provider: Minerva Da Silva A.P.R.N.     Patient Name: Mago Scott   YOB: 1980  MRN:              [id number]    Chief Complaint: ESRD awaiting transplant    History of Present Illness: Ms. Mago Billings is a 44 y.o. female whose current medical problems include ESRD, CVA, pulmonary hypertension, and lupus who is here for cardiac consultation for ESRD awaiting transplant.     services were used in the patient's primary language of Malagasy.     Name or Number: [id number]  Mode of interpretation: iPad    Content of Interpretation:    The patient presents today to establish care. The patient reports feeling today. She denies any chest pain or shortness of breath on exertion. She can easily go up 2 flights of stairs without any chest pain or shortness of breath.  She is quite active, walks daily without any problems.  She denies any orthopnea, PND, or leg swelling. No palpitations. No syncope or presyncopal episodes.     The patient reports that she was asked to undergo LHC by transplant team, and the patient reports severe allergies to contrast even with premedication.  She is asking for alternative testing.      Cardiovascular Risk Factors:  1. Smoking status: Never smoker  2. Type II Diabetes Mellitus: None  Lab Results   Component Value Date/Time    HBA1C 5.1 07/08/2022 04:38 PM    HBA1C 4.8 09/16/2021 05:20 PM    HBA1C 5.0 01/22/2021 12:54 PM     3. Hypertension: None  4. Dyslipidemia: None  Cholesterol,Tot   Date Value Ref  Range Status   09/17/2021 155 100 - 199 mg/dL Final     LDL   Date Value Ref Range Status   09/17/2021 79 <100 mg/dL Final     HDL   Date Value Ref Range Status   09/17/2021 65 >=40 mg/dL Final     Triglycerides   Date Value Ref Range Status   09/17/2021 54 0 - 149 mg/dL Final     5. Family history of early Coronary Artery Disease in a first degree relative (Male less than 55 years of age; Female less than 65 years of age): None  6.  Obesity and/or Metabolic Syndrome: Body mass index is 22.61 kg/m².  7. Sedentary lifestyle: Active    Review of Systems   Constitutional: Negative for fever, malaise/fatigue and night sweats.   Cardiovascular:  Negative for chest pain, dyspnea on exertion, irregular heartbeat, leg swelling, near-syncope, orthopnea, palpitations, paroxysmal nocturnal dyspnea and syncope.   Respiratory:  Negative for cough, shortness of breath and wheezing.    Gastrointestinal:  Negative for abdominal pain, diarrhea, nausea and vomiting.   Neurological:  Negative for dizziness, focal weakness and weakness.         All other systems reviewed and are negative.       Current Outpatient Medications   Medication Sig Dispense Refill    calcium carbonate (TUMS) 500 MG Chew Tab Chew 500 mg 3 times a day as needed. Indications: Heartburn      VITAMIN D PO Take 1 Tablet by mouth every 48 hours.      calcitRIOL (ROCALTROL) 0.25 MCG Cap Take 1 capsule by mouth once daily (Patient taking differently: Take 0.25 mcg by mouth every day.) 30 Capsule 0    Multiple Vitamins-Minerals (ONE-A-DAY WOMENS PO) Take 1 Tablet by mouth every day.       No current facility-administered medications for this visit.         Allergies   Allergen Reactions    Labetalol Palpitations     Fast heart rate, rash, HTN    Povidone-Iodine Anaphylaxis     MD stated not to use    Iodine Rash     Rash    Morphine Itching     Pt states that she can tolerate small dose of morphine (can tolerate up to 2mg dosage).    Pork Allergy Itching     Itchy skin  "   Benadryl Allergy     Betadine [Povidone Iodine] Anaphylaxis     MD stated not to use    Iodine Contrast [Diagnostic X-Ray Materials] Unspecified     \"Burning\"    Shellfish-Derived Products Rash       Physical Exam:  Ambulatory Vitals  /70   Pulse 71   Resp 18   Ht 1.702 m (5' 7.01\")   Wt 65.5 kg (144 lb 6.4 oz)   SpO2 98%    Oxygen Therapy:  Pulse Oximetry: 98 %  BP Readings from Last 4 Encounters:   09/09/24 124/70   07/24/24 126/80   05/22/24 123/79   12/21/23 113/73       Weight/BMI: Body mass index is 22.61 kg/m².  Wt Readings from Last 4 Encounters:   09/09/24 65.5 kg (144 lb 6.4 oz)   07/24/24 61.5 kg (135 lb 8 oz)   05/22/24 62.7 kg (138 lb 3.7 oz)   12/20/23 61.5 kg (135 lb 9.3 oz)       General: Well appearing and in no apparent distress  Eyes: nl conjunctiva, no icteric sclera  ENT: normal external appearance of ears, nose, and throat  Neck: no visible JVP,  no carotid bruits  Lungs: normal respiratory effort, CTAB  Heart: RRR, no murmurs, no rubs or gallops,  no edema bilateral lower extremities. No LV/RV heave on cardiac palpatation. + bilateral radial pulses.  + bilateral dp pulses.   Abdomen: soft, non tender, non distended, no masses, normal bowel sounds.  No HSM.  Extremities/MSK: no clubbing, no cyanosis  Neurological: No focal sensory deficits  Psychiatric: Appropriate affect, A/O x 3, intact judgement and insight  Skin: Warm extremities      Lab Data Review:  Lab Results   Component Value Date/Time    CHOLSTRLTOT 155 09/17/2021 02:36 AM    LDL 79 09/17/2021 02:36 AM    HDL 65 09/17/2021 02:36 AM    TRIGLYCERIDE 54 09/17/2021 02:36 AM       Lab Results   Component Value Date/Time    SODIUM 138 04/04/2024 01:51 PM    POTASSIUM 4.9 04/04/2024 01:51 PM    CHLORIDE 90 (L) 04/04/2024 01:51 PM    CO2 31 04/04/2024 01:51 PM    GLUCOSE 91 04/04/2024 01:51 PM    BUN 54 (H) 04/04/2024 01:51 PM    CREATININE 7.54 (HH) 04/04/2024 01:51 PM    CREATININE 3.0 (H) 10/14/2008 09:29 AM     Lab Results "   Component Value Date/Time    ALKPHOSPHAT 97 04/04/2024 01:51 PM    ASTSGOT 15 04/04/2024 01:51 PM    ALTSGPT 13 04/04/2024 01:51 PM    TBILIRUBIN 0.4 04/04/2024 01:51 PM      Lab Results   Component Value Date/Time    WBC 3.8 (L) 04/04/2024 01:51 PM    HEMOGLOBIN 14.8 04/04/2024 01:51 PM     Lab Results   Component Value Date/Time    HBA1C 5.1 07/08/2022 04:38 PM    HBA1C 4.8 09/16/2021 05:20 PM    HBA1C 5.0 01/22/2021 12:54 PM         Cardiac Imaging and Procedures Review:    EKG dated 9/9/2024: My personal interpretation is sinus rhythm    Echo dated 7/4/2024:   CONCLUSIONS  Compared to the prior study on 9/16/2021, no significant changes. LV   function remains preserved.     Normal left ventricular systolic function.  The left ventricular ejection fraction is visually estimated to be 55%.  Normal right ventricular size. Normal right ventricular systolic   function.    Treadmill EKG 1/25/2021  CONCLUSIONS   Negative stress ECG for ischemia.    Exercise capacity very good at >10 METS.       Assessment & Plan    1. ESRD (end stage renal disease) ([facility])  EKG    NM-CARDIAC STRESS TEST      2. Pre-transplant evaluation for ESRD (end stage renal disease)  NM-CARDIAC STRESS TEST      3. Contrast media allergy  NM-CARDIAC STRESS TEST      4. Elevated BP without diagnosis of hypertension        5. Preop cardiovascular exam  NM-CARDIAC STRESS TEST            Shared Medical Decision Making:    ESRD on dialysis  Pre-transplant evaluation  Preop cardiovascular exam  Contrast allergy  The patient reports severe allergy to contrast allergy. The patient had reactions to contrast even despite premedication for contrast previously.  The patient is active and asymptomatic without history of dyslipidemia, hypertension, or diabetes.  Additionally, EKG is normal today.   -I think it is reasonable to plan for nuclear stress test instead of left heart cath given severe contrast allergy as she is very active without any symptoms.  -I  will send this note to her transplant team (Dr. Ольга Mills) to update with the discussion/decision above.    Elevated BP without diagnosis of hypertension  Pt reports home BP 120s/70s.  -Counseled the patietn to measure BP at home.  -If home BP above 130/80, will consider adding antihypertensives.       All of the patient's excellent questions were answered to the best of my knowledge and to her satisfaction.  It was a pleasure seeing Ms. Mago Billings in my clinic today. Return in about 3 months (around 12/9/2024). Patient is aware to call the cardiology clinic with any questions or concerns.      Ezio Orta MD  [facility], [address]  [address] [address]  Phone: [phone]  Fax: [phone]        Ольга Mills MD  [address] [place] [address], [address] [place] [id number]  Via Fax: [phone]

## 2024-09-11 ENCOUNTER — APPOINTMENT (OUTPATIENT)
Dept: RADIOLOGY | Facility: MEDICAL CENTER | Age: 44
End: 2024-09-11
Attending: EMERGENCY MEDICINE
Payer: COMMERCIAL

## 2024-09-11 ENCOUNTER — OFFICE VISIT (OUTPATIENT)
Dept: URGENT CARE | Facility: PHYSICIAN GROUP | Age: 44
End: 2024-09-11
Payer: COMMERCIAL

## 2024-09-11 ENCOUNTER — HOSPITAL ENCOUNTER (EMERGENCY)
Facility: MEDICAL CENTER | Age: 44
End: 2024-09-11
Payer: COMMERCIAL

## 2024-09-11 ENCOUNTER — HOSPITAL ENCOUNTER (EMERGENCY)
Facility: MEDICAL CENTER | Age: 44
End: 2024-09-11
Attending: EMERGENCY MEDICINE
Payer: COMMERCIAL

## 2024-09-11 VITALS
HEART RATE: 74 BPM | OXYGEN SATURATION: 97 % | DIASTOLIC BLOOD PRESSURE: 85 MMHG | TEMPERATURE: 99.3 F | HEIGHT: 67 IN | BODY MASS INDEX: 21.63 KG/M2 | SYSTOLIC BLOOD PRESSURE: 122 MMHG | RESPIRATION RATE: 21 BRPM | WEIGHT: 137.79 LBS

## 2024-09-11 VITALS
TEMPERATURE: 97.9 F | BODY MASS INDEX: 21.55 KG/M2 | SYSTOLIC BLOOD PRESSURE: 120 MMHG | WEIGHT: 137.3 LBS | RESPIRATION RATE: 22 BRPM | DIASTOLIC BLOOD PRESSURE: 82 MMHG | HEART RATE: 107 BPM | HEIGHT: 67 IN | OXYGEN SATURATION: 99 %

## 2024-09-11 DIAGNOSIS — R55 VASOVAGAL NEAR SYNCOPE: ICD-10-CM

## 2024-09-11 DIAGNOSIS — R00.2 PALPITATIONS: ICD-10-CM

## 2024-09-11 DIAGNOSIS — R42 DIZZINESS: ICD-10-CM

## 2024-09-11 DIAGNOSIS — Z99.2 DIALYSIS PATIENT (HCC): ICD-10-CM

## 2024-09-11 LAB
ALBUMIN SERPL BCP-MCNC: 4.3 G/DL (ref 3.2–4.9)
ALBUMIN/GLOB SERPL: 1.2 G/DL
ALP SERPL-CCNC: 71 U/L (ref 30–99)
ALT SERPL-CCNC: 22 U/L (ref 2–50)
ANION GAP SERPL CALC-SCNC: 16 MMOL/L (ref 7–16)
AST SERPL-CCNC: 15 U/L (ref 12–45)
BASOPHILS # BLD AUTO: 0.9 % (ref 0–1.8)
BASOPHILS # BLD: 0.04 K/UL (ref 0–0.12)
BILIRUB SERPL-MCNC: 0.3 MG/DL (ref 0.1–1.5)
BUN SERPL-MCNC: 44 MG/DL (ref 8–22)
CALCIUM ALBUM COR SERPL-MCNC: 9.4 MG/DL (ref 8.5–10.5)
CALCIUM SERPL-MCNC: 9.6 MG/DL (ref 8.4–10.2)
CHLORIDE SERPL-SCNC: 89 MMOL/L (ref 96–112)
CO2 SERPL-SCNC: 28 MMOL/L (ref 20–33)
CREAT SERPL-MCNC: 6.28 MG/DL (ref 0.5–1.4)
EKG IMPRESSION: NORMAL
EOSINOPHIL # BLD AUTO: 0.13 K/UL (ref 0–0.51)
EOSINOPHIL NFR BLD: 2.9 % (ref 0–6.9)
ERYTHROCYTE [DISTWIDTH] IN BLOOD BY AUTOMATED COUNT: 47.3 FL (ref 35.9–50)
GFR SERPLBLD CREATININE-BSD FMLA CKD-EPI: 8 ML/MIN/1.73 M 2
GLOBULIN SER CALC-MCNC: 3.7 G/DL (ref 1.9–3.5)
GLUCOSE SERPL-MCNC: 114 MG/DL (ref 65–99)
HCT VFR BLD AUTO: 49.3 % (ref 37–47)
HGB BLD-MCNC: 15.8 G/DL (ref 12–16)
IMM GRANULOCYTES # BLD AUTO: 0.01 K/UL (ref 0–0.11)
IMM GRANULOCYTES NFR BLD AUTO: 0.2 % (ref 0–0.9)
LYMPHOCYTES # BLD AUTO: 0.82 K/UL (ref 1–4.8)
LYMPHOCYTES NFR BLD: 18.3 % (ref 22–41)
MCH RBC QN AUTO: 27.1 PG (ref 27–33)
MCHC RBC AUTO-ENTMCNC: 32 G/DL (ref 32.2–35.5)
MCV RBC AUTO: 84.7 FL (ref 81.4–97.8)
MONOCYTES # BLD AUTO: 0.54 K/UL (ref 0–0.85)
MONOCYTES NFR BLD AUTO: 12.1 % (ref 0–13.4)
NEUTROPHILS # BLD AUTO: 2.93 K/UL (ref 1.82–7.42)
NEUTROPHILS NFR BLD: 65.6 % (ref 44–72)
NRBC # BLD AUTO: 0 K/UL
NRBC BLD-RTO: 0 /100 WBC (ref 0–0.2)
NT-PROBNP SERPL IA-MCNC: 1618 PG/ML (ref 0–125)
PLATELET # BLD AUTO: 183 K/UL (ref 164–446)
PMV BLD AUTO: 11.2 FL (ref 9–12.9)
POTASSIUM SERPL-SCNC: 4.1 MMOL/L (ref 3.6–5.5)
PROT SERPL-MCNC: 8 G/DL (ref 6–8.2)
RBC # BLD AUTO: 5.82 M/UL (ref 4.2–5.4)
SODIUM SERPL-SCNC: 133 MMOL/L (ref 135–145)
TROPONIN T SERPL-MCNC: 34 NG/L (ref 6–19)
WBC # BLD AUTO: 4.5 K/UL (ref 4.8–10.8)

## 2024-09-11 PROCEDURE — 99205 OFFICE O/P NEW HI 60 MIN: CPT

## 2024-09-11 PROCEDURE — 36415 COLL VENOUS BLD VENIPUNCTURE: CPT

## 2024-09-11 PROCEDURE — 85025 COMPLETE CBC W/AUTO DIFF WBC: CPT

## 2024-09-11 PROCEDURE — 3079F DIAST BP 80-89 MM HG: CPT

## 2024-09-11 PROCEDURE — 93005 ELECTROCARDIOGRAM TRACING: CPT | Performed by: EMERGENCY MEDICINE

## 2024-09-11 PROCEDURE — 80053 COMPREHEN METABOLIC PANEL: CPT

## 2024-09-11 PROCEDURE — 3074F SYST BP LT 130 MM HG: CPT

## 2024-09-11 PROCEDURE — 71045 X-RAY EXAM CHEST 1 VIEW: CPT

## 2024-09-11 PROCEDURE — 83880 ASSAY OF NATRIURETIC PEPTIDE: CPT

## 2024-09-11 PROCEDURE — 99283 EMERGENCY DEPT VISIT LOW MDM: CPT

## 2024-09-11 PROCEDURE — 84484 ASSAY OF TROPONIN QUANT: CPT

## 2024-09-11 ASSESSMENT — ENCOUNTER SYMPTOMS
DIZZINESS: 1
WEAKNESS: 1
PALPITATIONS: 1

## 2024-09-11 ASSESSMENT — FIBROSIS 4 INDEX
FIB4 SCORE: 0.83
FIB4 SCORE: 0.83

## 2024-09-11 NOTE — PROGRESS NOTES
Verbal consent was acquired by the patient to use Pluralsight ambient listening note generation during this visit   Subjective:   Mago Guy is a 44 y.o. female who presents for Dizziness (She takes therapist 3 times a wk Headache, fatigue, high B/P, ear plugged's, she is very concern with this symptoms she is having after the therapy every time she takes it //She's also doing dialysis.)      HPI:  History of Present Illness  The patient is a 44-year-old female who presents for evaluation of dizziness, weakness, and palpitations after dialysis.  services used.   She is currently undergoing dialysis and has been experiencing symptoms such as clogged ears, dizziness, weakness, palpitations, and elevated blood pressure after each treatment. These symptoms have been present for several months and occur after each dialysis session. . She reports attempts have been made to adjust her fluid intake and output during dialysis, but this has not alleviated her symptoms.    Today, she felt extremely weak, fatigued, and nearly fainted. Currently, she feels tired, slightly weak, and dizzy. Her palpitations have decreased, but she reports feeling slight confusion. She was driven to the clinic by her .         Review of Systems   Constitutional:  Positive for malaise/fatigue.   Cardiovascular:  Positive for palpitations. Negative for chest pain.   Neurological:  Positive for dizziness and weakness.       Medications:    Current Outpatient Medications on File Prior to Visit   Medication Sig Dispense Refill    calcium carbonate (TUMS) 500 MG Chew Tab Chew 500 mg 3 times a day as needed. Indications: Heartburn      VITAMIN D PO Take 1 Tablet by mouth every 48 hours.      calcitRIOL (ROCALTROL) 0.25 MCG Cap Take 1 capsule by mouth once daily (Patient taking differently: Take 0.25 mcg by mouth every day.) 30 Capsule 0    Multiple Vitamins-Minerals (ONE-A-DAY WOMENS PO) Take 1 Tablet by mouth  "every day.       No current facility-administered medications on file prior to visit.        Allergies:   Labetalol, Povidone-iodine, Iodine, Morphine, Pork allergy, Benadryl allergy, Betadine [povidone iodine], Iodine contrast [diagnostic x-ray materials], and Shellfish-derived products    Problem List:   Patient Active Problem List   Diagnosis    Stage IV lupus nephritis (WHO) (HCC)    Anasarca    Leukopenia    Moderate protein malnutrition (HCC)    Non compliance w medication regimen    Anemia of chronic disease    Hyponatremia    Ascites    End stage renal disease (HCC)    Normocytic anemia    Pleural effusion    Noncompliance    Abdominal pain    ESRD on hemodialysis (HCC)    Elevated LFTs    Chronic combined systolic and diastolic CHF (congestive heart failure) (HCC)    Elevated troponin    History of lupus nephritis    Acute CVA (cerebrovascular accident) (HCC)    Febrile illness, acute    Thrombocytopenia (HCC)    Systolic CHF, acute (HCC)    Systolic heart failure secondary to hypertension (HCC)    Abnormal brain MRI    Prolonged Q-T interval on ECG    Essential hypertension    Hypertensive urgency    Pulmonary hypertension (HCC)    Chest pain    Hypokalemia    History of CVA (cerebrovascular accident)    Systemic lupus erythematosus with organ system involvement (HCC)        Surgical History:  Past Surgical History:   Procedure Laterality Date    OTHER CARDIAC SURGERY      \"liquid drained from heart\", per pt    PRIMARY C SECTION       (of some type)       Past Social Hx:   Social History     Tobacco Use    Smoking status: Never    Smokeless tobacco: Never   Vaping Use    Vaping status: Never Used   Substance Use Topics    Alcohol use: Not Currently    Drug use: Never          Problem list, medications, and allergies reviewed by myself today in Epic.     Objective:     /82   Pulse (!) 107   Temp 36.6 °C (97.9 °F) (Temporal)   Resp (!) 22   Ht 1.702 m (5' 7\")   Wt 62.3 kg (137 lb 4.8 oz)  "  SpO2 99%   BMI 21.50 kg/m²     Physical Exam  Vitals and nursing note reviewed.   Constitutional:       General: She is not in acute distress.     Appearance: Normal appearance. She is normal weight. She is ill-appearing. She is not toxic-appearing.   HENT:      Head: Normocephalic and atraumatic.   Cardiovascular:      Rate and Rhythm: Regular rhythm. Tachycardia present.      Pulses: Normal pulses.      Heart sounds: Normal heart sounds. No murmur heard.     No friction rub. No gallop.   Pulmonary:      Effort: Pulmonary effort is normal. No respiratory distress.      Breath sounds: Normal breath sounds. No stridor. No wheezing, rhonchi or rales.   Chest:      Chest wall: No tenderness.   Skin:     General: Skin is warm and dry.      Capillary Refill: Capillary refill takes less than 2 seconds.   Neurological:      General: No focal deficit present.      Mental Status: She is alert and oriented to person, place, and time. Mental status is at baseline.      Gait: Gait normal.   Psychiatric:         Mood and Affect: Mood normal.         Behavior: Behavior normal.         Thought Content: Thought content normal.         Judgment: Judgment normal.         Assessment/Plan:     Diagnosis and associated orders:   1. Dizziness    2. Dialysis patient (HCC)    3. Palpitations        Comments/MDM:     Assessment & Plan  1. Dizziness.  She reports feeling dizzy, weak, confused, and palpitations  during and after dialysis treatments. Immediate lab tests are necessary to identify the underlying cause. She was advised to seek medical attention at the emergency room where they can perform the necessary tests promptly. Her  will accompany her to [facility]. Transfer center was called                            Please note that this dictation was created using voice recognition software. I have made a reasonable attempt to correct obvious errors, but I expect that there are errors of grammar and possibly content that I did  not discover before finalizing the note.    This note was electronically signed by PARIS Haro

## 2024-09-12 NOTE — ED TRIAGE NOTES
Pt presents with  from urgent care for feelings of buzzing in her ears, headache, dizziness, and weakness that began during her dialysis today. There is a concern for low iron.     Pt also reports with managing BP during treatments that ends dialysis early over the past 4 months.     Pt A&Ox4 and ambulatory. Pt is Lebanese speaker.

## 2024-09-12 NOTE — ED NOTES
Pt given discharge instructions with  services to complete stress test and follow up with cardiology. All questions and concerns answered and addressed.

## 2024-09-12 NOTE — ED PROVIDER NOTES
ED Provider Note    Primary care provider: RM Hooper    CHIEF COMPLAINT  Chief Complaint   Patient presents with    Dizziness    Weakness         Limitation to History:  Select: Language Czech,  Used     HPI  Mago Guy is a 44 y.o. female who presents to the Emergency Department for lightheadedness.  The patient has been on hemodialysis for the last 11 years.  Over the past 3 months every time she gets hooked up to the dialysis machine she feels as if she is being poisoned.  She describes it as a sensation of her ears plugging and she is getting very lightheaded and feeling unwell.  This lasted throughout the extent of the hemodialysis session.  When they disconnected from the machine she feels better almost immediately and it gradually improved throughout the day, by the next day she is back to her usual state of health.  She has brought this up with the nephrologist and they did not have any suggestions for her.  They have decreased the amount of fluid they take off of her and this did not seem to improve the symptoms.    She denies any recent fevers or chills, she denies any new medications.  She denies any new changes at dialysis center.      She does exercise regularly, she can run, walk, go up stairs and does not have the symptoms.  She does have a history of allergic reaction to iodine, this caused body itching, this is not similar to what she has when she gets hemodialysis.      External Record Review: Patient with history of SLE causing ESRD, now on HD.  Reviewed the most recent note from the cardiology clinic, seen there 2 days ago.  Per the cardiology note the transplant team wanted the patient undergo LHC and the patient reports a severe allergy to IV contrast.  Most recent 2D echo July 4, ejection fraction 55%, most recent stress test was 2021 which was negative and they stated that she had good exercise capacity at over 10 METS.  The plan with cardiology was  "to set the patient up for nuc med stress test.    REVIEW OF SYSTEMS  See HPI.     PAST MEDICAL HISTORY   has a past medical history of Dialysis patient ([facility]), EKG abnormality, ESRD (end stage renal disease) on dialysis ([facility]) (2014), Hypertension, Kidney injury, and Lupus nephritis, ISN/RPS class IV ([facility]) (2006).    SURGICAL HISTORY   has a past surgical history that includes other cardiac surgery and primary c section.    SOCIAL HISTORY  Social History     Tobacco Use    Smoking status: Never    Smokeless tobacco: Never   Vaping Use    Vaping status: Never Used   Substance Use Topics    Alcohol use: Not Currently    Drug use: Never      Social History     Substance and Sexual Activity   Drug Use Never       FAMILY HISTORY  Family History   Problem Relation Age of Onset    Kidney Disease Neg Hx        CURRENT MEDICATIONS  Reviewed.  See Encounter Summary.     ALLERGIES  Allergies   Allergen Reactions    Labetalol Palpitations     Fast heart rate, rash, HTN    Povidone-Iodine Anaphylaxis     MD stated not to use    Iodine Rash     Rash    Morphine Itching     Pt states that she can tolerate small dose of morphine (can tolerate up to 2mg dosage).    Pork Allergy Itching     Itchy skin    Benadryl Allergy     Betadine [Povidone Iodine] Anaphylaxis     MD stated not to use    Iodine Contrast [Diagnostic X-Ray Materials] Unspecified     \"Burning\"    Shellfish-Derived Products Rash       PHYSICAL EXAM  VITAL SIGNS: /85   Pulse 74   Temp 37.4 °C (99.3 °F) (Temporal)   Resp (!) 21   Ht 1.702 m (5' 7\")   Wt 62.5 kg (137 lb 12.6 oz)   LMP 08/12/2024   SpO2 97%   BMI 21.58 kg/m²   Constitutional: Awake, alert in no apparent distress.  HENT: Normocephalic, Bilateral external ears normal. Nose normal.   Eyes: Conjunctiva normal, non-icteric, EOMI.    Thorax & Lungs: Easy unlabored respirations, Clear to ascultation bilaterally.  Cardiovascular: Regular rate, Regular rhythm, No murmurs, rubs or gallops. Bilateral " pulses symmetrical.  Palpable thrill left forearm.  Abdomen:  Soft, nontender, nondistended, normal active bowel sounds.   :    Skin: Visualized skin is  Dry, No erythema, No rash.   Musculoskeletal:   No cyanosis, clubbing or edema. No leg asymmetry.   Neurologic: Alert, Grossly non-focal.   Psychiatric: Normal affect, Normal mood  Lymphatic:      EKG   12 lead Interpreted by me  Rhythm:  Normal sinus rhythm   Rate: 80  Axis: normal  Ectopy: none  Conduction: normal  ST Segments: no acute change  T Waves: no acute change  Clinical Impression: Normal EKG without acute changes       RADIOLOGY  I have independently interpreted the diagnostic imaging associated with this visit and am waiting the final reading from the radiologist.   My preliminary interpretation is as follows: No cardiomegaly    Radiologist interpretation:   DX-CHEST-PORTABLE (1 VIEW)   Final Result      No acute cardiac or pulmonary abnormalities are identified.          COURSE & MEDICAL DECISION MAKING  Pertinent Labs & Imaging studies reviewed. (See chart for details)    COURSE & MEDICAL DECISION MAKING  Pertinent Labs & Imaging studies reviewed. (See chart for details)    Differential diagnoses include but are not limited to: Not entirely clear, see discussion below.    5:38 PM - Nursing notes reviewed, patient seen and examined at bedside.      Escalation of care considered, and ultimately not performed: acute inpatient care management, however at this time, the patient is most appropriate for outpatient management.        Decision tools and prescription drugs considered including, but not limited to: Heart score 1    Decision Making:  This is a pleasant 44 y.o. year old female who has been on hemodialysis for 11 years, now getting ears plugging, lightheaded and feeling of being poisoned while going under hemodialysis for the past 3 months.    I considered the possibility that she may be having an allergy to the anticoagulant in the dialysis  machine, this seems very unlikely as it happens abruptly when being connected and she does not describe it as itching or flushing which she normally gets when she has an allergy to IV contrast dye.    I also considered cardiogenic etiology, very unlikely to be ACS.  The patient exercises regularly and does not have similar symptoms.  It is possible that she is getting vasovagal near syncope which could cause a similar event.  EKG without acute ischemic changes, no QT prolongation, no shortened MA, no Brugada's, no delta waves.  She does not need any further testing now, has recently had a 2D echo as well.  Recommend follow-up with cardiology for Zio patch.    Lastly consider the possibility of hemodialysis disequilibrium syndrome, this does not typically fit her clinical picture as she has been compliant with dialysis and has been on it for quite some time, will defer to her nephrologist for this as a slower approach dialysis may be beneficial if this is the case.           The patient was discharged home (see d/c instructions) was told to return immediately for any signs or symptoms listed, or any worsening at all.  The patient verbally agreed to the discharge precautions and follow-up plan which is documented in EPIC.    Discharge Medications:  New Prescriptions    No medications on file       FINAL IMPRESSION  1. Vasovagal near syncope

## 2024-09-12 NOTE — ED NOTES
Pt was evaluated by MD  with the use of interpretor pad  [id number] Serg perez. for MD and pt  pt aware of POC and is in agreement w/ it

## 2024-09-20 ENCOUNTER — HOSPITAL ENCOUNTER (OUTPATIENT)
Dept: RADIOLOGY | Facility: MEDICAL CENTER | Age: 44
End: 2024-09-20
Attending: STUDENT IN AN ORGANIZED HEALTH CARE EDUCATION/TRAINING PROGRAM
Payer: COMMERCIAL

## 2024-09-20 ENCOUNTER — TELEPHONE (OUTPATIENT)
Dept: CARDIOLOGY | Facility: MEDICAL CENTER | Age: 44
End: 2024-09-20

## 2024-09-20 DIAGNOSIS — Z91.041 CONTRAST MEDIA ALLERGY: ICD-10-CM

## 2024-09-20 DIAGNOSIS — Z01.818 PRE-TRANSPLANT EVALUATION FOR ESRD (END STAGE RENAL DISEASE): ICD-10-CM

## 2024-09-20 DIAGNOSIS — Z01.810 PREOP CARDIOVASCULAR EXAM: ICD-10-CM

## 2024-09-20 DIAGNOSIS — N18.6 ESRD (END STAGE RENAL DISEASE) (HCC): ICD-10-CM

## 2024-09-20 PROCEDURE — 93018 CV STRESS TEST I&R ONLY: CPT | Performed by: STUDENT IN AN ORGANIZED HEALTH CARE EDUCATION/TRAINING PROGRAM

## 2024-09-20 PROCEDURE — 78452 HT MUSCLE IMAGE SPECT MULT: CPT | Mod: 26 | Performed by: STUDENT IN AN ORGANIZED HEALTH CARE EDUCATION/TRAINING PROGRAM

## 2024-09-20 PROCEDURE — A9502 TC99M TETROFOSMIN: HCPCS

## 2024-09-20 NOTE — TELEPHONE ENCOUNTER
----- Message from Physician Ezio Orta M.D. sent at 9/20/2024  2:53 PM PDT -----  Could you fax the nuc stress results to her kidney transplant physician along with my office note?   Thank you!  Name is Dr. Ольга Mills at [facility]

## 2025-01-12 ENCOUNTER — HOSPITAL ENCOUNTER (EMERGENCY)
Facility: MEDICAL CENTER | Age: 45
End: 2025-01-12
Attending: EMERGENCY MEDICINE
Payer: COMMERCIAL

## 2025-01-12 VITALS
HEIGHT: 67 IN | SYSTOLIC BLOOD PRESSURE: 113 MMHG | OXYGEN SATURATION: 97 % | WEIGHT: 147.27 LBS | DIASTOLIC BLOOD PRESSURE: 72 MMHG | RESPIRATION RATE: 16 BRPM | BODY MASS INDEX: 23.11 KG/M2 | HEART RATE: 76 BPM | TEMPERATURE: 98.3 F

## 2025-01-12 DIAGNOSIS — B02.9 HERPES ZOSTER WITHOUT COMPLICATION: ICD-10-CM

## 2025-01-12 DIAGNOSIS — R10.9 RIGHT FLANK PAIN: ICD-10-CM

## 2025-01-12 PROCEDURE — A9270 NON-COVERED ITEM OR SERVICE: HCPCS | Performed by: EMERGENCY MEDICINE

## 2025-01-12 PROCEDURE — 700102 HCHG RX REV CODE 250 W/ 637 OVERRIDE(OP): Performed by: EMERGENCY MEDICINE

## 2025-01-12 PROCEDURE — 99284 EMERGENCY DEPT VISIT MOD MDM: CPT

## 2025-01-12 RX ORDER — VALACYCLOVIR HYDROCHLORIDE 1 G/1
500 TABLET, FILM COATED ORAL NIGHTLY
Qty: 4 TABLET | Refills: 0 | Status: ACTIVE | OUTPATIENT
Start: 2025-01-12 | End: 2025-01-19

## 2025-01-12 RX ORDER — VALACYCLOVIR HYDROCHLORIDE 500 MG/1
500 TABLET, FILM COATED ORAL ONCE
Status: COMPLETED | OUTPATIENT
Start: 2025-01-12 | End: 2025-01-12

## 2025-01-12 RX ADMIN — VALACYCLOVIR 500 MG: 500 TABLET, FILM COATED ORAL at 15:32

## 2025-01-12 ASSESSMENT — FIBROSIS 4 INDEX: FIB4 SCORE: 0.73

## 2025-01-12 NOTE — ED NOTES
Pt medicated per orders. Pt declines questions and is to ambulate out of ER with family as ride home.

## 2025-01-12 NOTE — ED TRIAGE NOTES
"/88   Pulse 79   Temp 36.8 °C (98.3 °F) (Temporal)   Resp 18   Ht 1.702 m (5' 7\")   Wt 66.8 kg (147 lb 4.3 oz)   LMP 12/30/2024 (Approximate)   SpO2 95%   BMI 23.07 kg/m²   Chief Complaint   Patient presents with    Abdominal Pain     Was seen this past Wednesday at  for same   pain continues  having pain to RLQ pain that rads to her lower back buttock are   has beginnings of a rash w/ fine blisters starting to back side    no Hx such      Comes in w/ son  rash to lower back buttock area appears to look the starting of shingles   "

## 2025-01-12 NOTE — ED PROVIDER NOTES
"  ER Provider Note    Scribed for Saul John M.D. by Walt Zimmer. 2025   3:10 PM    Primary Care Provider: RM Hooper    CHIEF COMPLAINT  Chief Complaint   Patient presents with    Abdominal Pain     Was seen this past Wednesday at  for same   pain continues  having pain to RLQ pain that rads to her lower back buttock are   has beginnings of a rash w/ fine blisters starting to back side    no Hx such      EXTERNAL RECORDS REVIEWED  Outpatient Notes Patient had a normal stress test reviewed 2024    HPI/ROS  LIMITATION TO HISTORY   Select: : None  OUTSIDE HISTORIAN(S):  Family is present at bedside.    Mago Guy is a 44 y.o. female who presents to the ED for evaluation of abdominal pain and rash onset four days ago. Patient reports that she began having right lower abdominal pain that radiates to her lower back about four days ago, and she noticed development of a blistering rash yesterday. She denies any history of similar pains. Patient was seen at urgent Care last week for the same complaint, but has continued pain. Patient is on dialysis.     PAST MEDICAL HISTORY  Past Medical History:   Diagnosis Date    Dialysis patient ([facility])     EKG abnormality     borderline prolonged QTC.    ESRD (end stage renal disease) on dialysis ([facility])     Hypertension     Kidney injury     Lupus nephritis, ISN/RPS class IV ([facility])        SURGICAL HISTORY  Past Surgical History:   Procedure Laterality Date    OTHER CARDIAC SURGERY      \"liquid drained from heart\", per pt    PRIMARY C SECTION       (of some type)       FAMILY HISTORY  Family History   Problem Relation Age of Onset    Kidney Disease Neg Hx        SOCIAL HISTORY   reports that she has never smoked. She has never used smokeless tobacco. She reports that she does not currently use alcohol. She reports that she does not use drugs.    CURRENT MEDICATIONS  Previous Medications    CALCITRIOL (ROCALTROL) 0.25 MCG " "CAP    Take 1 capsule by mouth once daily    CALCIUM CARBONATE (TUMS) 500 MG CHEW TAB    Chew 500 mg 3 times a day as needed. Indications: Heartburn    MULTIPLE VITAMINS-MINERALS (ONE-A-DAY WOMENS PO)    Take 1 Tablet by mouth every day.    VITAMIN D PO    Take 1 Tablet by mouth every 48 hours.       ALLERGIES  Allergies   Allergen Reactions    Labetalol Palpitations     Fast heart rate, rash, HTN    Povidone-Iodine Anaphylaxis     MD stated not to use    Iodine Rash     Rash    Morphine Itching     Pt states that she can tolerate small dose of morphine (can tolerate up to 2mg dosage).    Pork Allergy Itching     Itchy skin    Benadryl Allergy     Betadine [Povidone Iodine] Anaphylaxis     MD stated not to use    Iodine Contrast [Diagnostic X-Ray Materials] Unspecified     \"Burning\"    Shellfish-Derived Products Rash        PHYSICAL EXAM  /88   Pulse 79   Temp 36.8 °C (98.3 °F) (Temporal)   Resp 18   Ht 1.702 m (5' 7\")   Wt 66.8 kg (147 lb 4.3 oz)   LMP 12/30/2024 (Approximate)   SpO2 95%   BMI 23.07 kg/m²    Nursing note and vitals reviewed.  Constitutional: Well-developed and well-nourished. No distress.   HENT: Head is normocephalic and atraumatic. Oropharynx is clear and moist without exudate or erythema.   Eyes: Pupils are equal, round, and reactive to light. Conjunctiva are normal.   Cardiovascular: Normal rate and regular rhythm. No murmur heard. Normal radial pulses.  Pulmonary/Chest: Breath sounds normal. No wheezes or rales.   Abdominal: Soft and non-tender. No distention    Musculoskeletal: Extremities exhibit normal range of motion without edema or tenderness.   Neurological: Awake, alert and oriented to person, place, and time. No focal deficits noted.  Skin: Skin is warm and dry. Rash to the lower right flank consistent with shingles.   Psychiatric: Normal mood and affect. Appropriate for clinical situation    ASSESSMENT AND PLAN    3:10 PM - Patient was evaluated at bedside. Patient " arrives today with a blistering rash to her lower right flank that is consistent with shingles. I informed the patient and her family of treatment of this condition with prescribed medication.  Patient is on dialysis and therefore will take 500 mg of Valtrex daily.  I discussed plan for discharge and follow up as outlined below. The patient is stable for discharge at this time and will return for any new or worsening symptoms. Patient verbalizes understanding and support with my plan for discharge.    DISPOSITION AND DISCUSSIONS    I have discussed management of the patient with the following physicians and TAYE's:  None.     Discussion of management with other QHP or appropriate source(s): None     Escalation of care considered, and ultimately not performed: Laboratory analysis.    Barriers to care at this time, including but not limited to:  None are known .     Decision tools and prescription drugs considered including, but not limited to:  Valtrex .     The patient will return for new or worsening symptoms and is stable at the time of discharge.      DISPOSITION:  Patient will be discharged home in stable condition.    FOLLOW UP:  DEMARIO HooperPConsueloRConsueloNConsuelo  [address] [place] [id number]  [phone]    Schedule an appointment as soon as possible for a visit       [facility], Emergency Dept  [address]  [facility] [address] [phone]    If symptoms worsen      OUTPATIENT MEDICATIONS:  New Prescriptions    VALACYCLOVIR (VALTREX) 1 GM TAB    Take 0.5 Tablets by mouth every 48 hours for 7 days. Take on dialysis days (M,W,F) after dialysis.        FINAL DIAGNOSIS  1. Herpes zoster without complication    2. Right flank pain         Walt EVANGELISTA), am scribing for, and in the presence of, Saul John M.D..    Electronically signed by: Walt Zelaya), 1/12/2025    Saul EVANGELISTA M.D. personally performed the services described in this  documentation, as scribed by Walt Zimmer in my presence, and it is both accurate and complete.      The note accurately reflects work and decisions made by me.  Saul John M.D.  1/12/2025  8:58 PM

## 2025-01-15 ENCOUNTER — TELEPHONE (OUTPATIENT)
Facility: MEDICAL CENTER | Age: 45
End: 2025-01-15
Payer: COMMERCIAL

## 2025-01-15 NOTE — TELEPHONE ENCOUNTER
"[facility]  Kidney Transplant Program- Referral Review    Patient Information  Patient Name  Mago Guy   Date of Birth  1980   MRN  [id number]   [place] Citizenship No    BMI  Estimated body mass index is 23.07 kg/m² as calculated from the following:    Height as of 1/12/25: 1.702 m (5' 7\").    Weight as of 1/12/25: 66.8 kg (147 lb 4.3 oz).     Referral Information  Dialysis   Yes   Unit Name  [facility]     Did patient provide social security number?  No     Is an  needed: Yes,  Turkish    Ambulatory assistance needed: No     What hospital or medical center is most care received?  [facility]    Have you had any recent hospitalizations? Yes   Hospital Name: [facility]   When: January 2025    Specialty Providers  Do you have a PCP? Yes   Name: Dr. Minerva [facility]  Do you have a cardiologist or heart doctor? No    Name:   Do you have a urologist? No    Name:  Do you have any other specialty doctors? No    Names(s):  Have you had a colonoscopy? No    When:   Where:    Provider information will be added to care team and most recent notes and testing will be requested.     Transplant Information:  Evaluated or declined at any other transplant programs? Yes  If so, which center: [facility]- evaluated  Potential living donors? No   Previously received a transplant? No   Organ: N/A  Where:  When:    Records will be requested and sent to RN coordinator for further review and plan. Patient encouraged to contact us with any questions or medical updates in the interm.     At this time, does [facility] have verbal consent from you, Mago Guy, to request records from external facilities for your consult/evaluation? Yes  If patient stated no, we advised that the patient is required to obtain their records and bring them in with them to their first appointment.       Electronically Signed by   Oanh Rubio  1/15/2025 9:33 AM             "

## 2025-01-20 ENCOUNTER — DOCUMENTATION (OUTPATIENT)
Facility: MEDICAL CENTER | Age: 45
End: 2025-01-20
Payer: COMMERCIAL

## 2025-01-20 DIAGNOSIS — Z01.818 PRE-TRANSPLANT EVALUATION FOR KIDNEY TRANSPLANT: ICD-10-CM
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
## 2025-01-23 ENCOUNTER — HOSPITAL ENCOUNTER (OUTPATIENT)
Dept: LAB | Facility: MEDICAL CENTER | Age: 45
End: 2025-01-23
Attending: FAMILY MEDICINE

## 2025-01-23 ENCOUNTER — HOSPITAL ENCOUNTER (OUTPATIENT)
Dept: RADIOLOGY | Facility: MEDICAL CENTER | Age: 45
End: 2025-01-23
Attending: FAMILY MEDICINE

## 2025-01-23 ENCOUNTER — HOSPITAL ENCOUNTER (OUTPATIENT)
Dept: CARDIOLOGY | Facility: MEDICAL CENTER | Age: 45
End: 2025-01-23
Attending: INTERNAL MEDICINE
Payer: COMMERCIAL

## 2025-01-23 ENCOUNTER — APPOINTMENT (OUTPATIENT)
Dept: LAB | Facility: MEDICAL CENTER | Age: 45
End: 2025-01-23
Payer: COMMERCIAL

## 2025-01-23 DIAGNOSIS — N18.6 END STAGE RENAL DISEASE (HCC): ICD-10-CM

## 2025-01-23 LAB — EKG IMPRESSION: NORMAL

## 2025-02-20 ENCOUNTER — TELEPHONE (OUTPATIENT)
Dept: CARDIOLOGY | Facility: MEDICAL CENTER | Age: 45
End: 2025-02-20
Payer: COMMERCIAL